# Patient Record
Sex: MALE | Race: WHITE | NOT HISPANIC OR LATINO | URBAN - METROPOLITAN AREA
[De-identification: names, ages, dates, MRNs, and addresses within clinical notes are randomized per-mention and may not be internally consistent; named-entity substitution may affect disease eponyms.]

---

## 2017-12-27 PROBLEM — Z00.00 ENCOUNTER FOR PREVENTIVE HEALTH EXAMINATION: Status: ACTIVE | Noted: 2017-12-27

## 2018-01-02 ENCOUNTER — OUTPATIENT (OUTPATIENT)
Dept: OUTPATIENT SERVICES | Facility: HOSPITAL | Age: 83
LOS: 1 days | End: 2018-01-02
Payer: MEDICARE

## 2018-01-02 ENCOUNTER — APPOINTMENT (OUTPATIENT)
Dept: ULTRASOUND IMAGING | Facility: HOSPITAL | Age: 83
End: 2018-01-02
Payer: MEDICARE

## 2018-01-02 DIAGNOSIS — N18.3 CHRONIC KIDNEY DISEASE, STAGE 3 (MODERATE): ICD-10-CM

## 2018-01-02 DIAGNOSIS — N18.9 CHRONIC KIDNEY DISEASE, UNSPECIFIED: ICD-10-CM

## 2018-01-02 PROCEDURE — 76770 US EXAM ABDO BACK WALL COMP: CPT | Mod: 26

## 2018-01-02 PROCEDURE — 76770 US EXAM ABDO BACK WALL COMP: CPT

## 2018-03-14 ENCOUNTER — APPOINTMENT (OUTPATIENT)
Dept: CT IMAGING | Facility: HOSPITAL | Age: 83
End: 2018-03-14
Payer: MEDICARE

## 2018-03-14 ENCOUNTER — OUTPATIENT (OUTPATIENT)
Dept: OUTPATIENT SERVICES | Facility: HOSPITAL | Age: 83
LOS: 1 days | End: 2018-03-14
Payer: MEDICARE

## 2018-03-14 DIAGNOSIS — K57.30 DIVERTICULOSIS OF LARGE INTESTINE WITHOUT PERFORATION OR ABSCESS WITHOUT BLEEDING: ICD-10-CM

## 2018-03-14 DIAGNOSIS — Z00.8 ENCOUNTER FOR OTHER GENERAL EXAMINATION: ICD-10-CM

## 2018-03-14 DIAGNOSIS — N28.1 CYST OF KIDNEY, ACQUIRED: ICD-10-CM

## 2018-03-14 PROCEDURE — 74176 CT ABD & PELVIS W/O CONTRAST: CPT

## 2018-03-14 PROCEDURE — 74176 CT ABD & PELVIS W/O CONTRAST: CPT | Mod: 26

## 2018-03-28 ENCOUNTER — APPOINTMENT (OUTPATIENT)
Dept: MRI IMAGING | Facility: HOSPITAL | Age: 83
End: 2018-03-28
Payer: MEDICARE

## 2018-03-28 ENCOUNTER — OUTPATIENT (OUTPATIENT)
Dept: OUTPATIENT SERVICES | Facility: HOSPITAL | Age: 83
LOS: 1 days | End: 2018-03-28
Payer: MEDICARE

## 2018-03-28 DIAGNOSIS — K76.89 OTHER SPECIFIED DISEASES OF LIVER: ICD-10-CM

## 2018-03-28 DIAGNOSIS — N28.1 CYST OF KIDNEY, ACQUIRED: ICD-10-CM

## 2018-03-28 DIAGNOSIS — Z00.8 ENCOUNTER FOR OTHER GENERAL EXAMINATION: ICD-10-CM

## 2018-03-28 PROCEDURE — 74183 MRI ABD W/O CNTR FLWD CNTR: CPT

## 2018-03-28 PROCEDURE — 74183 MRI ABD W/O CNTR FLWD CNTR: CPT | Mod: 26

## 2018-03-28 PROCEDURE — A9579: CPT

## 2018-09-18 ENCOUNTER — EMERGENCY (EMERGENCY)
Facility: HOSPITAL | Age: 83
LOS: 1 days | Discharge: HOME | End: 2018-09-18
Attending: EMERGENCY MEDICINE
Payer: MEDICARE

## 2018-09-18 VITALS
OXYGEN SATURATION: 92 % | HEART RATE: 83 BPM | TEMPERATURE: 100 F | SYSTOLIC BLOOD PRESSURE: 163 MMHG | DIASTOLIC BLOOD PRESSURE: 94 MMHG | RESPIRATION RATE: 14 BRPM | WEIGHT: 154.98 LBS

## 2018-09-18 LAB
ALBUMIN SERPL ELPH-MCNC: 3.8 G/DL — SIGNIFICANT CHANGE UP (ref 3.3–5)
ALP SERPL-CCNC: 64 U/L — SIGNIFICANT CHANGE UP (ref 40–120)
ALT FLD-CCNC: 9 U/L — LOW (ref 12–78)
ANION GAP SERPL CALC-SCNC: 9 MMOL/L — SIGNIFICANT CHANGE UP (ref 5–17)
APPEARANCE UR: CLEAR — SIGNIFICANT CHANGE UP
APTT BLD: 30.5 SEC — SIGNIFICANT CHANGE UP (ref 27.5–37.4)
AST SERPL-CCNC: 23 U/L — SIGNIFICANT CHANGE UP (ref 15–37)
BASOPHILS # BLD AUTO: 0.02 K/UL — SIGNIFICANT CHANGE UP (ref 0–0.2)
BASOPHILS NFR BLD AUTO: 0.3 % — SIGNIFICANT CHANGE UP (ref 0–2)
BILIRUB SERPL-MCNC: 0.6 MG/DL — SIGNIFICANT CHANGE UP (ref 0.2–1.2)
BILIRUB UR-MCNC: NEGATIVE — SIGNIFICANT CHANGE UP
BUN SERPL-MCNC: 24 MG/DL — HIGH (ref 7–23)
CALCIUM SERPL-MCNC: 8.4 MG/DL — LOW (ref 8.5–10.1)
CHLORIDE SERPL-SCNC: 106 MMOL/L — SIGNIFICANT CHANGE UP (ref 96–108)
CO2 SERPL-SCNC: 27 MMOL/L — SIGNIFICANT CHANGE UP (ref 22–31)
COLOR SPEC: SIGNIFICANT CHANGE UP
CREAT SERPL-MCNC: 1.4 MG/DL — HIGH (ref 0.5–1.3)
D DIMER BLD IA.RAPID-MCNC: 609 NG/ML DDU — HIGH
DIFF PNL FLD: ABNORMAL
EOSINOPHIL # BLD AUTO: 0.02 K/UL — SIGNIFICANT CHANGE UP (ref 0–0.5)
EOSINOPHIL NFR BLD AUTO: 0.3 % — SIGNIFICANT CHANGE UP (ref 0–6)
GLUCOSE SERPL-MCNC: 89 MG/DL — SIGNIFICANT CHANGE UP (ref 70–99)
GLUCOSE UR QL: NEGATIVE — SIGNIFICANT CHANGE UP
HCT VFR BLD CALC: 39.8 % — SIGNIFICANT CHANGE UP (ref 39–50)
HGB BLD-MCNC: 13.6 G/DL — SIGNIFICANT CHANGE UP (ref 13–17)
HPIV2 RNA SPEC QL NAA+PROBE: DETECTED
IMM GRANULOCYTES NFR BLD AUTO: 0.3 % — SIGNIFICANT CHANGE UP (ref 0–1.5)
INR BLD: 1.13 RATIO — SIGNIFICANT CHANGE UP (ref 0.88–1.16)
KETONES UR-MCNC: NEGATIVE — SIGNIFICANT CHANGE UP
LACTATE SERPL-SCNC: 1 MMOL/L — SIGNIFICANT CHANGE UP (ref 0.7–2)
LEUKOCYTE ESTERASE UR-ACNC: NEGATIVE — SIGNIFICANT CHANGE UP
LYMPHOCYTES # BLD AUTO: 0.75 K/UL — LOW (ref 1–3.3)
LYMPHOCYTES # BLD AUTO: 12 % — LOW (ref 13–44)
MCHC RBC-ENTMCNC: 30.7 PG — SIGNIFICANT CHANGE UP (ref 27–34)
MCHC RBC-ENTMCNC: 34.2 GM/DL — SIGNIFICANT CHANGE UP (ref 32–36)
MCV RBC AUTO: 89.8 FL — SIGNIFICANT CHANGE UP (ref 80–100)
MONOCYTES # BLD AUTO: 0.31 K/UL — SIGNIFICANT CHANGE UP (ref 0–0.9)
MONOCYTES NFR BLD AUTO: 5 % — SIGNIFICANT CHANGE UP (ref 2–14)
NEUTROPHILS # BLD AUTO: 5.14 K/UL — SIGNIFICANT CHANGE UP (ref 1.8–7.4)
NEUTROPHILS NFR BLD AUTO: 82.1 % — HIGH (ref 43–77)
NITRITE UR-MCNC: NEGATIVE — SIGNIFICANT CHANGE UP
PH UR: 5 — SIGNIFICANT CHANGE UP (ref 5–8)
PLATELET # BLD AUTO: 161 K/UL — SIGNIFICANT CHANGE UP (ref 150–400)
POTASSIUM SERPL-MCNC: 4.5 MMOL/L — SIGNIFICANT CHANGE UP (ref 3.5–5.3)
POTASSIUM SERPL-SCNC: 4.5 MMOL/L — SIGNIFICANT CHANGE UP (ref 3.5–5.3)
PROCALCITONIN SERPL-MCNC: <0.05 — SIGNIFICANT CHANGE UP (ref 0–0.04)
PROT SERPL-MCNC: 7.2 G/DL — SIGNIFICANT CHANGE UP (ref 6–8.3)
PROT UR-MCNC: 75 MG/DL
PROTHROM AB SERPL-ACNC: 12.4 SEC — SIGNIFICANT CHANGE UP (ref 9.8–12.7)
RAPID RVP RESULT: DETECTED
RBC # BLD: 4.43 M/UL — SIGNIFICANT CHANGE UP (ref 4.2–5.8)
RBC # FLD: 13.4 % — SIGNIFICANT CHANGE UP (ref 10.3–14.5)
SODIUM SERPL-SCNC: 142 MMOL/L — SIGNIFICANT CHANGE UP (ref 135–145)
SP GR SPEC: 1.01 — SIGNIFICANT CHANGE UP (ref 1.01–1.02)
TROPONIN I SERPL-MCNC: <.015 NG/ML — SIGNIFICANT CHANGE UP (ref 0.01–0.04)
UROBILINOGEN FLD QL: NEGATIVE — SIGNIFICANT CHANGE UP
WBC # BLD: 6.26 K/UL — SIGNIFICANT CHANGE UP (ref 3.8–10.5)
WBC # FLD AUTO: 6.26 K/UL — SIGNIFICANT CHANGE UP (ref 3.8–10.5)

## 2018-09-18 PROCEDURE — 99285 EMERGENCY DEPT VISIT HI MDM: CPT

## 2018-09-18 PROCEDURE — 71275 CT ANGIOGRAPHY CHEST: CPT | Mod: 26

## 2018-09-18 PROCEDURE — 71045 X-RAY EXAM CHEST 1 VIEW: CPT | Mod: 26

## 2018-09-18 RX ORDER — SODIUM CHLORIDE 9 MG/ML
1000 INJECTION INTRAMUSCULAR; INTRAVENOUS; SUBCUTANEOUS
Qty: 0 | Refills: 0 | Status: DISCONTINUED | OUTPATIENT
Start: 2018-09-18 | End: 2018-09-22

## 2018-09-18 RX ORDER — ALBUTEROL 90 UG/1
2.5 AEROSOL, METERED ORAL ONCE
Qty: 0 | Refills: 0 | Status: COMPLETED | OUTPATIENT
Start: 2018-09-18 | End: 2018-09-18

## 2018-09-18 RX ORDER — ALBUTEROL 90 UG/1
2 AEROSOL, METERED ORAL
Qty: 1 | Refills: 0
Start: 2018-09-18 | End: 2018-09-22

## 2018-09-18 RX ORDER — ACETAMINOPHEN 500 MG
650 TABLET ORAL ONCE
Qty: 0 | Refills: 0 | Status: COMPLETED | OUTPATIENT
Start: 2018-09-18 | End: 2018-09-18

## 2018-09-18 RX ORDER — DEXAMETHASONE 0.5 MG/5ML
10 ELIXIR ORAL ONCE
Qty: 0 | Refills: 0 | Status: COMPLETED | OUTPATIENT
Start: 2018-09-18 | End: 2018-09-18

## 2018-09-18 RX ORDER — IBUPROFEN 200 MG
600 TABLET ORAL ONCE
Qty: 0 | Refills: 0 | Status: COMPLETED | OUTPATIENT
Start: 2018-09-18 | End: 2018-09-18

## 2018-09-18 RX ADMIN — Medication 650 MILLIGRAM(S): at 22:00

## 2018-09-18 RX ADMIN — Medication 650 MILLIGRAM(S): at 20:10

## 2018-09-18 RX ADMIN — Medication 600 MILLIGRAM(S): at 23:42

## 2018-09-18 RX ADMIN — Medication 10 MILLIGRAM(S): at 23:39

## 2018-09-18 RX ADMIN — SODIUM CHLORIDE 100 MILLILITER(S): 9 INJECTION INTRAMUSCULAR; INTRAVENOUS; SUBCUTANEOUS at 19:41

## 2018-09-18 NOTE — ED ADULT NURSE NOTE - NSIMPLEMENTINTERV_GEN_ALL_ED
Implemented All Fall with Harm Risk Interventions:  [place] to call system. Call bell, personal items and telephone within reach. Instruct patient to call for assistance. Room bathroom lighting operational. Non-slip footwear when patient is off stretcher. Physically safe environment: no spills, clutter or unnecessary equipment. Stretcher in lowest position, wheels locked, appropriate side rails in place. Provide visual cue, wrist band, yellow gown, etc. Monitor gait and stability. Monitor for mental status changes and reorient to person, place, and time. Review medications for side effects contributing to fall risk. Reinforce activity limits and safety measures with patient and family. Provide visual clues: red socks.

## 2018-09-18 NOTE — ED PROVIDER NOTE - NS ED ROS FT
GEN: +fever, no chills, +weakness  HENT: no eye pain, no visual changes, no ear pain, no visual or hearing changes, +sore throat, no swelling or neck pain  CV: no chest pain, no palpitations, no dizziness, no swelling  RESP: +coughing, no sob, no IWOB, no [place]  GI: no abd pain, no distension, no nausea, no vomiting, no diarrhea, no constipation  : no dysuria,  no frequency, no hematuria, no discharge, no flank pain  MUSCULOSKELETAL: no myalgia, no arthralgia, no joint swelling, no bruising   SKIN: no rash, no wounds, no itching  NEURO: no change in mentation, no visual changes, no HA, no focal weakness, no trouble speaking, no gait abnormalities, no dizziness  PSYCH: no suidical ideation, no homicidal ideation, no depression, no anxiety, no hallucinations

## 2018-09-18 NOTE — ED PROVIDER NOTE - OBJECTIVE STATEMENT
81yo M h/o htn, parkinsons, recent travel to [place], [place] 2wks prior p/w fever, sore throat, cough, weakness x 2 days, seen at urgent care, found to be hypoxic, sent to ed for eval. denies n/v/d/cp/swelling/sob.

## 2018-09-18 NOTE — ED PROVIDER NOTE - PROGRESS NOTE DETAILS
ekg labs cxr unremarkable, elevated d-dimer, given recent travel, cta obtained, no PE, pna however incidental aortic anuerysm, pos RVP, pt ambulatory, findings discussed w/ pt, pt to f/u w/ pcp/cardiothoracic for further eval and mgmt

## 2018-09-18 NOTE — ED PROVIDER NOTE - MEDICAL DECISION MAKING DETAILS
83yo M h/o htn, parkinsons, recent travel to [place], [place] 2wks prior p/w fever, sore throat, cough, weakness x 2 days, seen at urgent care, found to be hypoxic, sent to ed for eval. denies n/v/d/cp/swelling/sob.

## 2018-09-18 NOTE — ED ADULT NURSE NOTE - OBJECTIVE STATEMENT
Pt recvd in room 15b- A&Ox4-pt came from Urgent care with c/o fever sore throat and weakness- found to have low O2 sat at urgent care so was sent here- pt with recent travel to [place] and [place] 2 weeks ago - has had symptoms for 2 days now -

## 2018-09-19 VITALS
TEMPERATURE: 100 F | HEART RATE: 80 BPM | RESPIRATION RATE: 20 BRPM | OXYGEN SATURATION: 94 % | SYSTOLIC BLOOD PRESSURE: 110 MMHG | DIASTOLIC BLOOD PRESSURE: 62 MMHG

## 2018-09-19 PROCEDURE — 85027 COMPLETE CBC AUTOMATED: CPT

## 2018-09-19 PROCEDURE — 85610 PROTHROMBIN TIME: CPT

## 2018-09-19 PROCEDURE — 84484 ASSAY OF TROPONIN QUANT: CPT

## 2018-09-19 PROCEDURE — 85730 THROMBOPLASTIN TIME PARTIAL: CPT

## 2018-09-19 PROCEDURE — 87798 DETECT AGENT NOS DNA AMP: CPT

## 2018-09-19 PROCEDURE — 96374 THER/PROPH/DIAG INJ IV PUSH: CPT | Mod: XU

## 2018-09-19 PROCEDURE — 71275 CT ANGIOGRAPHY CHEST: CPT

## 2018-09-19 PROCEDURE — 93005 ELECTROCARDIOGRAM TRACING: CPT

## 2018-09-19 PROCEDURE — 71045 X-RAY EXAM CHEST 1 VIEW: CPT

## 2018-09-19 PROCEDURE — 99284 EMERGENCY DEPT VISIT MOD MDM: CPT | Mod: 25

## 2018-09-19 PROCEDURE — 87581 M.PNEUMON DNA AMP PROBE: CPT

## 2018-09-19 PROCEDURE — 94640 AIRWAY INHALATION TREATMENT: CPT

## 2018-09-19 PROCEDURE — 85379 FIBRIN DEGRADATION QUANT: CPT

## 2018-09-19 PROCEDURE — 87486 CHLMYD PNEUM DNA AMP PROBE: CPT

## 2018-09-19 PROCEDURE — 87633 RESP VIRUS 12-25 TARGETS: CPT

## 2018-09-19 PROCEDURE — 80053 COMPREHEN METABOLIC PANEL: CPT

## 2018-09-19 PROCEDURE — 81001 URINALYSIS AUTO W/SCOPE: CPT

## 2018-09-19 PROCEDURE — 87040 BLOOD CULTURE FOR BACTERIA: CPT

## 2018-09-19 PROCEDURE — 84145 PROCALCITONIN (PCT): CPT

## 2018-09-19 PROCEDURE — 83605 ASSAY OF LACTIC ACID: CPT

## 2018-09-19 RX ADMIN — ALBUTEROL 2.5 MILLIGRAM(S): 90 AEROSOL, METERED ORAL at 00:03

## 2018-09-19 RX ADMIN — Medication 600 MILLIGRAM(S): at 00:15

## 2018-09-19 NOTE — ED ADULT NURSE REASSESSMENT NOTE - NS ED NURSE REASSESS COMMENT FT1
Pt diagnosed with Parainfluenza, d/nicole home-pt and wife verbalized understanding of d/c instruction- was assisted by EDT to vehicle via wheelchair in stable condition.

## 2018-09-23 LAB
CULTURE RESULTS: SIGNIFICANT CHANGE UP
CULTURE RESULTS: SIGNIFICANT CHANGE UP
SPECIMEN SOURCE: SIGNIFICANT CHANGE UP
SPECIMEN SOURCE: SIGNIFICANT CHANGE UP

## 2019-07-24 NOTE — ED ADULT NURSE NOTE - NSFALLRSKHARMRISK_ED_ALL_ED
Dr. Henriquez - See message below.       He is scheduled to see you on 8/16/19 for a wellness visit.  Can the H&P be completed also?   yes

## 2019-08-08 PROBLEM — I10 ESSENTIAL (PRIMARY) HYPERTENSION: Chronic | Status: ACTIVE | Noted: 2018-09-18

## 2019-08-08 PROBLEM — G20 PARKINSON'S DISEASE: Chronic | Status: ACTIVE | Noted: 2018-09-18

## 2019-08-08 PROBLEM — B02.9 ZOSTER WITHOUT COMPLICATIONS: Chronic | Status: ACTIVE | Noted: 2018-09-18

## 2019-08-22 ENCOUNTER — APPOINTMENT (OUTPATIENT)
Dept: MRI IMAGING | Facility: HOSPITAL | Age: 84
End: 2019-08-22
Payer: MEDICARE

## 2019-08-22 ENCOUNTER — OUTPATIENT (OUTPATIENT)
Dept: OUTPATIENT SERVICES | Facility: HOSPITAL | Age: 84
LOS: 1 days | End: 2019-08-22
Payer: MEDICARE

## 2019-08-22 DIAGNOSIS — Z00.8 ENCOUNTER FOR OTHER GENERAL EXAMINATION: ICD-10-CM

## 2019-08-22 PROCEDURE — 74183 MRI ABD W/O CNTR FLWD CNTR: CPT

## 2019-08-22 PROCEDURE — A9579: CPT

## 2019-08-22 PROCEDURE — 74183 MRI ABD W/O CNTR FLWD CNTR: CPT | Mod: 26

## 2020-02-27 ENCOUNTER — APPOINTMENT (OUTPATIENT)
Dept: ULTRASOUND IMAGING | Facility: HOSPITAL | Age: 85
End: 2020-02-27
Payer: MEDICARE

## 2020-02-27 ENCOUNTER — OUTPATIENT (OUTPATIENT)
Dept: OUTPATIENT SERVICES | Facility: HOSPITAL | Age: 85
LOS: 1 days | End: 2020-02-27
Payer: MEDICARE

## 2020-02-27 DIAGNOSIS — N18.3 CHRONIC KIDNEY DISEASE, STAGE 3 (MODERATE): ICD-10-CM

## 2020-02-27 DIAGNOSIS — N28.1 CYST OF KIDNEY, ACQUIRED: ICD-10-CM

## 2020-02-27 PROCEDURE — 76770 US EXAM ABDO BACK WALL COMP: CPT | Mod: 26

## 2020-02-27 PROCEDURE — 76770 US EXAM ABDO BACK WALL COMP: CPT

## 2020-09-11 ENCOUNTER — EMERGENCY (EMERGENCY)
Facility: HOSPITAL | Age: 85
LOS: 1 days | Discharge: HOME | End: 2020-09-11
Attending: EMERGENCY MEDICINE | Admitting: EMERGENCY MEDICINE
Payer: MEDICARE

## 2020-09-11 VITALS
RESPIRATION RATE: 18 BRPM | OXYGEN SATURATION: 92 % | TEMPERATURE: 98 F | HEART RATE: 103 BPM | DIASTOLIC BLOOD PRESSURE: 73 MMHG | SYSTOLIC BLOOD PRESSURE: 107 MMHG

## 2020-09-11 VITALS
OXYGEN SATURATION: 95 % | DIASTOLIC BLOOD PRESSURE: 80 MMHG | TEMPERATURE: 98 F | RESPIRATION RATE: 16 BRPM | HEART RATE: 85 BPM | SYSTOLIC BLOOD PRESSURE: 110 MMHG

## 2020-09-11 PROCEDURE — 70450 CT HEAD/BRAIN W/O DYE: CPT | Mod: 26

## 2020-09-11 PROCEDURE — 99284 EMERGENCY DEPT VISIT MOD MDM: CPT | Mod: 25

## 2020-09-11 PROCEDURE — 72125 CT NECK SPINE W/O DYE: CPT

## 2020-09-11 PROCEDURE — 70450 CT HEAD/BRAIN W/O DYE: CPT

## 2020-09-11 PROCEDURE — 70486 CT MAXILLOFACIAL W/O DYE: CPT

## 2020-09-11 PROCEDURE — 99284 EMERGENCY DEPT VISIT MOD MDM: CPT

## 2020-09-11 PROCEDURE — 72125 CT NECK SPINE W/O DYE: CPT | Mod: 26

## 2020-09-11 PROCEDURE — 70486 CT MAXILLOFACIAL W/O DYE: CPT | Mod: 26

## 2020-09-11 RX ORDER — BACITRACIN ZINC 500 UNIT/G
1 OINTMENT IN PACKET (EA) TOPICAL ONCE
Refills: 0 | Status: COMPLETED | OUTPATIENT
Start: 2020-09-11 | End: 2020-09-11

## 2020-09-11 RX ADMIN — Medication 1 APPLICATION(S): at 14:40

## 2020-09-11 NOTE — ED PROVIDER NOTE - NSFOLLOWUPINSTRUCTIONS_ED_ALL_ED_FT
WHAT YOU NEED TO KNOW:    As you age, your muscles weaken and your risk for falls increases. Your risk also increases if you take medicines that make you sleepy or dizzy. You may also be at risk if you have vision or joint problems, have low blood pressure, or are not active.    DISCHARGE INSTRUCTIONS:    Call 911 or have someone else call if:     You have fallen and are unconscious.      You have fallen and cannot move part of your body.    Contact your healthcare provider if:     You have fallen and have pain or a headache.      You have questions or concerns about your condition or care.    Fall prevention tips:     Stay active. Exercise can help strengthen your muscles and improve your balance. Your healthcare provider may recommend water aerobics, walking, or [place] Chi. He or she may also recommend physical therapy to improve your coordination. Never start an exercise program without asking your healthcare provider first.Water Aerobics for Seniors     Casper [employer] for Seniors           Wear shoes that fit well and have soles that . Wear shoes both inside and outside. Use slippers with good . Avoid shoes with high heels.      Use assistive devices as directed. Your healthcare provider may suggest that you use a cane or walker to help you keep your balance. You may need to have grab bars put in your bathroom near the toilet or in the shower.      Stand or sit up slowly. This may help you keep your balance and prevent falls.      Wear a personal alarm. This is a device that allows you to call 911 if you need help. Ask for more information on personal alarms.      Manage your medical conditions. Keep all appointments with your healthcare providers. Visit your eye doctor as directed.    Home safety tips:     Add items to prevent falls in the bathroom. Put nonslip strips on your bath or shower floor to prevent you from slipping. Use a bath mat if you do not have carpet in the bathroom. This will prevent you from falling when you step out of the bath or shower. Use a shower seat so you do not need to stand while you shower. Sit on the toilet or a chair in your bathroom to dry yourself and put on clothing. This will prevent you from losing your balance from drying or dressing yourself while you are standing.      Keep paths clear. Remove books, shoes, and other objects from walkways and stairs. Place cords for telephones and lamps out of the way so that you do not need to walk over them. Tape them down if you cannot move them. Remove small rugs. If you cannot remove a rug, secure it with double-sided tape. This will prevent you from tripping.      Install bright lights in your home. Use night lights to help light paths to the bathroom or kitchen. Always turn on the light before you start walking.      Keep items you use often on shelves within reach. Do not use a step stool to help you reach an item.      Paint or place reflective tape on the edges of your stairs. This will help you see the stairs better.    Follow up with your healthcare provider as directed: Write down your questions so you remember to ask them during your visits.        WHAT YOU NEED TO KNOW:    An abrasion is a scrape on your skin. It happens when your skin rubs against a rough surface. Some examples of an abrasion include rug burn, a skinned elbow, or road rash. Abrasions can be many shapes and sizes. The wound may hurt, bleed, bruise, or swell.     DISCHARGE INSTRUCTIONS:    Return to the emergency department if:     The bleeding does not stop after 10 minutes of firm pressure.      You cannot rinse one or more foreign objects out of your wound.      You have red streaks on your skin coming from your wound.    Contact your healthcare provider if:     You have a fever or chills.       Your abrasion is red, warm, swollen, or draining pus.      You have questions or concerns about your condition or care.    Care for your abrasion:     Wash your hands and dry them with a clean towel.      Press a clean cloth against your wound to stop any bleeding.      Rinse your wound with a lot of clean water. Do not use harsh soap, alcohol, or iodine solutions.      Use a clean, wet cloth to remove any objects, such as small pieces of rocks or dirt.      Rub antibiotic ointment on your wound. This may help prevent infection and help your wound heal.      Cover the wound with a non-stick bandage. Change the bandage daily, and if gets wet or dirty.     Follow up with your healthcare provider as directed: Write down your questions so you remember to ask them during your visits.        © Copyright [employer] 2020       back to top                      © Copyright [employer] 2020

## 2020-09-11 NOTE — ED PROVIDER NOTE - CARE PLAN
Principal Discharge DX:	Fall, initial encounter  Secondary Diagnosis:	Abrasion of face, initial encounter

## 2020-09-11 NOTE — ED PROVIDER NOTE - PATIENT PORTAL LINK FT
You can access the FollowMyHealth Patient Portal offered by [facility] by registering at the following website: [email or web address]. By joining [facility]’s FollowMyHealth portal, you will also be able to view your health information using other applications (apps) compatible with our system.

## 2020-09-11 NOTE — ED ADULT TRIAGE NOTE - CHIEF COMPLAINT QUOTE
patient came in ED BIBA s/p tripped and fall. as per EMS, patient was seen by bystanders who fell in front of [employer].

## 2021-11-30 ENCOUNTER — INPATIENT (INPATIENT)
Facility: HOSPITAL | Age: 86
LOS: 6 days | Discharge: HOME | DRG: 343 | End: 2021-12-07
Attending: FAMILY MEDICINE | Admitting: FAMILY MEDICINE
Payer: MEDICARE

## 2021-11-30 VITALS
OXYGEN SATURATION: 96 % | WEIGHT: 164.91 LBS | SYSTOLIC BLOOD PRESSURE: 125 MMHG | HEART RATE: 83 BPM | DIASTOLIC BLOOD PRESSURE: 84 MMHG | RESPIRATION RATE: 17 BRPM | TEMPERATURE: 98 F | HEIGHT: 60 IN

## 2021-11-30 DIAGNOSIS — K35.80 UNSPECIFIED ACUTE APPENDICITIS: ICD-10-CM

## 2021-11-30 DIAGNOSIS — I10 ESSENTIAL (PRIMARY) HYPERTENSION: ICD-10-CM

## 2021-11-30 DIAGNOSIS — G20 PARKINSON'S DISEASE: ICD-10-CM

## 2021-11-30 LAB
ALBUMIN SERPL ELPH-MCNC: 3 G/DL — LOW (ref 3.3–5)
ALP SERPL-CCNC: 55 U/L — SIGNIFICANT CHANGE UP (ref 40–120)
ALT FLD-CCNC: 10 U/L — LOW (ref 12–78)
ANION GAP SERPL CALC-SCNC: 5 MMOL/L — SIGNIFICANT CHANGE UP (ref 5–17)
APPEARANCE UR: CLEAR — SIGNIFICANT CHANGE UP
AST SERPL-CCNC: 13 U/L — LOW (ref 15–37)
BASOPHILS # BLD AUTO: 0.03 K/UL — SIGNIFICANT CHANGE UP (ref 0–0.2)
BASOPHILS NFR BLD AUTO: 0.3 % — SIGNIFICANT CHANGE UP (ref 0–2)
BILIRUB SERPL-MCNC: 0.5 MG/DL — SIGNIFICANT CHANGE UP (ref 0.2–1.2)
BILIRUB UR-MCNC: NEGATIVE — SIGNIFICANT CHANGE UP
BUN SERPL-MCNC: 33 MG/DL — HIGH (ref 7–23)
CALCIUM SERPL-MCNC: 9.1 MG/DL — SIGNIFICANT CHANGE UP (ref 8.5–10.1)
CHLORIDE SERPL-SCNC: 110 MMOL/L — HIGH (ref 96–108)
CO2 SERPL-SCNC: 26 MMOL/L — SIGNIFICANT CHANGE UP (ref 22–31)
COLOR SPEC: SIGNIFICANT CHANGE UP
CREAT SERPL-MCNC: 1.8 MG/DL — HIGH (ref 0.5–1.3)
DIFF PNL FLD: NEGATIVE — SIGNIFICANT CHANGE UP
EOSINOPHIL # BLD AUTO: 0.09 K/UL — SIGNIFICANT CHANGE UP (ref 0–0.5)
EOSINOPHIL NFR BLD AUTO: 0.9 % — SIGNIFICANT CHANGE UP (ref 0–6)
GLUCOSE SERPL-MCNC: 94 MG/DL — SIGNIFICANT CHANGE UP (ref 70–99)
GLUCOSE UR QL: NEGATIVE — SIGNIFICANT CHANGE UP
HCT VFR BLD CALC: 37.9 % — LOW (ref 39–50)
HGB BLD-MCNC: 13.3 G/DL — SIGNIFICANT CHANGE UP (ref 13–17)
IMM GRANULOCYTES NFR BLD AUTO: 0.4 % — SIGNIFICANT CHANGE UP (ref 0–1.5)
KETONES UR-MCNC: NEGATIVE — SIGNIFICANT CHANGE UP
LEUKOCYTE ESTERASE UR-ACNC: NEGATIVE — SIGNIFICANT CHANGE UP
LIDOCAIN IGE QN: 48 U/L — LOW (ref 73–393)
LYMPHOCYTES # BLD AUTO: 1.15 K/UL — SIGNIFICANT CHANGE UP (ref 1–3.3)
LYMPHOCYTES # BLD AUTO: 11.5 % — LOW (ref 13–44)
MCHC RBC-ENTMCNC: 31.4 PG — SIGNIFICANT CHANGE UP (ref 27–34)
MCHC RBC-ENTMCNC: 35.1 GM/DL — SIGNIFICANT CHANGE UP (ref 32–36)
MCV RBC AUTO: 89.4 FL — SIGNIFICANT CHANGE UP (ref 80–100)
MONOCYTES # BLD AUTO: 0.57 K/UL — SIGNIFICANT CHANGE UP (ref 0–0.9)
MONOCYTES NFR BLD AUTO: 5.7 % — SIGNIFICANT CHANGE UP (ref 2–14)
NEUTROPHILS # BLD AUTO: 8.11 K/UL — HIGH (ref 1.8–7.4)
NEUTROPHILS NFR BLD AUTO: 81.2 % — HIGH (ref 43–77)
NITRITE UR-MCNC: NEGATIVE — SIGNIFICANT CHANGE UP
NRBC # BLD: 0 /100 WBCS — SIGNIFICANT CHANGE UP (ref 0–0)
PH UR: 6 — SIGNIFICANT CHANGE UP (ref 5–8)
PLATELET # BLD AUTO: 182 K/UL — SIGNIFICANT CHANGE UP (ref 150–400)
POTASSIUM SERPL-MCNC: 4.3 MMOL/L — SIGNIFICANT CHANGE UP (ref 3.5–5.3)
POTASSIUM SERPL-SCNC: 4.3 MMOL/L — SIGNIFICANT CHANGE UP (ref 3.5–5.3)
PROT SERPL-MCNC: 6.8 G/DL — SIGNIFICANT CHANGE UP (ref 6–8.3)
PROT UR-MCNC: NEGATIVE — SIGNIFICANT CHANGE UP
RBC # BLD: 4.24 M/UL — SIGNIFICANT CHANGE UP (ref 4.2–5.8)
RBC # FLD: 12.9 % — SIGNIFICANT CHANGE UP (ref 10.3–14.5)
SARS-COV-2 RNA SPEC QL NAA+PROBE: SIGNIFICANT CHANGE UP
SODIUM SERPL-SCNC: 141 MMOL/L — SIGNIFICANT CHANGE UP (ref 135–145)
SP GR SPEC: 1.01 — SIGNIFICANT CHANGE UP (ref 1.01–1.02)
UROBILINOGEN FLD QL: NEGATIVE — SIGNIFICANT CHANGE UP
WBC # BLD: 9.99 K/UL — SIGNIFICANT CHANGE UP (ref 3.8–10.5)
WBC # FLD AUTO: 9.99 K/UL — SIGNIFICANT CHANGE UP (ref 3.8–10.5)

## 2021-11-30 PROCEDURE — 74176 CT ABD & PELVIS W/O CONTRAST: CPT | Mod: 26,MA

## 2021-11-30 PROCEDURE — 99284 EMERGENCY DEPT VISIT MOD MDM: CPT

## 2021-11-30 PROCEDURE — 99223 1ST HOSP IP/OBS HIGH 75: CPT

## 2021-11-30 RX ORDER — ATORVASTATIN CALCIUM 80 MG/1
1 TABLET, FILM COATED ORAL
Qty: 0 | Refills: 0 | DISCHARGE

## 2021-11-30 RX ORDER — SENNA PLUS 8.6 MG/1
2 TABLET ORAL AT BEDTIME
Refills: 0 | Status: DISCONTINUED | OUTPATIENT
Start: 2021-11-30 | End: 2021-12-07

## 2021-11-30 RX ORDER — SODIUM CHLORIDE 9 MG/ML
1000 INJECTION INTRAMUSCULAR; INTRAVENOUS; SUBCUTANEOUS ONCE
Refills: 0 | Status: COMPLETED | OUTPATIENT
Start: 2021-11-30 | End: 2021-11-30

## 2021-11-30 RX ORDER — PRAMIPEXOLE DIHYDROCHLORIDE 0.12 MG/1
0.12 TABLET ORAL AT BEDTIME
Refills: 0 | Status: DISCONTINUED | OUTPATIENT
Start: 2021-11-30 | End: 2021-11-30

## 2021-11-30 RX ORDER — ATORVASTATIN CALCIUM 80 MG/1
0 TABLET, FILM COATED ORAL
Qty: 0 | Refills: 0 | DISCHARGE

## 2021-11-30 RX ORDER — LINACLOTIDE 145 UG/1
1 CAPSULE, GELATIN COATED ORAL
Qty: 0 | Refills: 0 | DISCHARGE

## 2021-11-30 RX ORDER — POLYETHYLENE GLYCOL 3350 17 G/17G
17 POWDER, FOR SOLUTION ORAL DAILY
Refills: 0 | Status: DISCONTINUED | OUTPATIENT
Start: 2021-11-30 | End: 2021-12-06

## 2021-11-30 RX ORDER — RIVASTIGMINE 4.6 MG/24H
1 PATCH, EXTENDED RELEASE TRANSDERMAL
Qty: 0 | Refills: 0 | DISCHARGE

## 2021-11-30 RX ORDER — AMLODIPINE BESYLATE 2.5 MG/1
2.5 TABLET ORAL DAILY
Refills: 0 | Status: DISCONTINUED | OUTPATIENT
Start: 2021-11-30 | End: 2021-12-02

## 2021-11-30 RX ORDER — CALCITRIOL 0.5 UG/1
1 CAPSULE ORAL
Qty: 0 | Refills: 0 | DISCHARGE

## 2021-11-30 RX ORDER — PANTOPRAZOLE SODIUM 20 MG/1
40 TABLET, DELAYED RELEASE ORAL DAILY
Refills: 0 | Status: DISCONTINUED | OUTPATIENT
Start: 2021-11-30 | End: 2021-12-04

## 2021-11-30 RX ORDER — ATORVASTATIN CALCIUM 80 MG/1
20 TABLET, FILM COATED ORAL AT BEDTIME
Refills: 0 | Status: DISCONTINUED | OUTPATIENT
Start: 2021-11-30 | End: 2021-11-30

## 2021-11-30 RX ORDER — SODIUM CHLORIDE 9 MG/ML
1000 INJECTION, SOLUTION INTRAVENOUS
Refills: 0 | Status: DISCONTINUED | OUTPATIENT
Start: 2021-11-30 | End: 2021-12-01

## 2021-11-30 RX ORDER — GLYCERIN ADULT
1 SUPPOSITORY, RECTAL RECTAL DAILY
Refills: 0 | Status: DISCONTINUED | OUTPATIENT
Start: 2021-11-30 | End: 2021-12-07

## 2021-11-30 RX ORDER — OXYBUTYNIN CHLORIDE 5 MG
5 TABLET ORAL
Refills: 0 | Status: DISCONTINUED | OUTPATIENT
Start: 2021-11-30 | End: 2021-12-05

## 2021-11-30 RX ORDER — IOHEXOL 300 MG/ML
30 INJECTION, SOLUTION INTRAVENOUS ONCE
Refills: 0 | Status: COMPLETED | OUTPATIENT
Start: 2021-11-30 | End: 2021-11-30

## 2021-11-30 RX ORDER — MORPHINE SULFATE 50 MG/1
4 CAPSULE, EXTENDED RELEASE ORAL ONCE
Refills: 0 | Status: DISCONTINUED | OUTPATIENT
Start: 2021-11-30 | End: 2021-11-30

## 2021-11-30 RX ORDER — AMLODIPINE BESYLATE 2.5 MG/1
1 TABLET ORAL
Qty: 0 | Refills: 0 | DISCHARGE

## 2021-11-30 RX ORDER — ONDANSETRON 8 MG/1
4 TABLET, FILM COATED ORAL
Refills: 0 | Status: DISCONTINUED | OUTPATIENT
Start: 2021-11-30 | End: 2021-12-07

## 2021-11-30 RX ORDER — CALCITRIOL 0.5 UG/1
0.25 CAPSULE ORAL DAILY
Refills: 0 | Status: DISCONTINUED | OUTPATIENT
Start: 2021-11-30 | End: 2021-12-07

## 2021-11-30 RX ORDER — PRAMIPEXOLE DIHYDROCHLORIDE 0.12 MG/1
1 TABLET ORAL
Qty: 0 | Refills: 0 | DISCHARGE

## 2021-11-30 RX ORDER — CARBIDOPA AND LEVODOPA 25; 100 MG/1; MG/1
1.5 TABLET ORAL
Qty: 0 | Refills: 0 | DISCHARGE

## 2021-11-30 RX ORDER — GABAPENTIN 400 MG/1
100 CAPSULE ORAL THREE TIMES A DAY
Refills: 0 | Status: DISCONTINUED | OUTPATIENT
Start: 2021-11-30 | End: 2021-12-07

## 2021-11-30 RX ORDER — GABAPENTIN 400 MG/1
0 CAPSULE ORAL
Qty: 0 | Refills: 0 | DISCHARGE

## 2021-11-30 RX ORDER — MORPHINE SULFATE 50 MG/1
2 CAPSULE, EXTENDED RELEASE ORAL EVERY 6 HOURS
Refills: 0 | Status: DISCONTINUED | OUTPATIENT
Start: 2021-11-30 | End: 2021-11-30

## 2021-11-30 RX ORDER — ONDANSETRON 8 MG/1
4 TABLET, FILM COATED ORAL ONCE
Refills: 0 | Status: COMPLETED | OUTPATIENT
Start: 2021-11-30 | End: 2021-11-30

## 2021-11-30 RX ORDER — CARBIDOPA AND LEVODOPA 25; 100 MG/1; MG/1
1 TABLET ORAL ONCE
Refills: 0 | Status: COMPLETED | OUTPATIENT
Start: 2021-11-30 | End: 2021-11-30

## 2021-11-30 RX ORDER — ATORVASTATIN CALCIUM 80 MG/1
10 TABLET, FILM COATED ORAL AT BEDTIME
Refills: 0 | Status: DISCONTINUED | OUTPATIENT
Start: 2021-11-30 | End: 2021-12-07

## 2021-11-30 RX ORDER — PRAMIPEXOLE DIHYDROCHLORIDE 0.12 MG/1
1 TABLET ORAL DAILY
Refills: 0 | Status: DISCONTINUED | OUTPATIENT
Start: 2021-11-30 | End: 2021-12-07

## 2021-11-30 RX ORDER — PRAMIPEXOLE DIHYDROCHLORIDE 0.12 MG/1
0 TABLET ORAL
Qty: 0 | Refills: 0 | DISCHARGE

## 2021-11-30 RX ORDER — AMLODIPINE BESYLATE 2.5 MG/1
0 TABLET ORAL
Qty: 0 | Refills: 0 | DISCHARGE

## 2021-11-30 RX ORDER — CARBIDOPA AND LEVODOPA 25; 100 MG/1; MG/1
0 TABLET ORAL
Qty: 0 | Refills: 0 | DISCHARGE

## 2021-11-30 RX ORDER — CARBIDOPA AND LEVODOPA 25; 100 MG/1; MG/1
1 TABLET ORAL DAILY
Refills: 0 | Status: DISCONTINUED | OUTPATIENT
Start: 2021-11-30 | End: 2021-11-30

## 2021-11-30 RX ORDER — PIPERACILLIN AND TAZOBACTAM 4; .5 G/20ML; G/20ML
3.38 INJECTION, POWDER, LYOPHILIZED, FOR SOLUTION INTRAVENOUS EVERY 8 HOURS
Refills: 0 | Status: DISCONTINUED | OUTPATIENT
Start: 2021-12-01 | End: 2021-12-07

## 2021-11-30 RX ORDER — CARBIDOPA AND LEVODOPA 25; 100 MG/1; MG/1
1.5 TABLET ORAL
Refills: 0 | Status: DISCONTINUED | OUTPATIENT
Start: 2021-11-30 | End: 2021-12-07

## 2021-11-30 RX ORDER — SOLIFENACIN SUCCINATE 10 MG/1
1 TABLET ORAL
Qty: 0 | Refills: 0 | DISCHARGE

## 2021-11-30 RX ORDER — PIPERACILLIN AND TAZOBACTAM 4; .5 G/20ML; G/20ML
3.38 INJECTION, POWDER, LYOPHILIZED, FOR SOLUTION INTRAVENOUS ONCE
Refills: 0 | Status: COMPLETED | OUTPATIENT
Start: 2021-11-30 | End: 2021-11-30

## 2021-11-30 RX ORDER — CARBIDOPA AND LEVODOPA 25; 100 MG/1; MG/1
1.5 TABLET ORAL ONCE
Refills: 0 | Status: COMPLETED | OUTPATIENT
Start: 2021-11-30 | End: 2021-11-30

## 2021-11-30 RX ORDER — SOLIFENACIN SUCCINATE 10 MG/1
0 TABLET ORAL
Qty: 0 | Refills: 0 | DISCHARGE

## 2021-11-30 RX ADMIN — PIPERACILLIN AND TAZOBACTAM 200 GRAM(S): 4; .5 INJECTION, POWDER, LYOPHILIZED, FOR SOLUTION INTRAVENOUS at 23:04

## 2021-11-30 RX ADMIN — CARBIDOPA AND LEVODOPA 1.5 TABLET(S): 25; 100 TABLET ORAL at 19:11

## 2021-11-30 RX ADMIN — ATORVASTATIN CALCIUM 10 MILLIGRAM(S): 80 TABLET, FILM COATED ORAL at 23:56

## 2021-11-30 RX ADMIN — GABAPENTIN 100 MILLIGRAM(S): 400 CAPSULE ORAL at 22:57

## 2021-11-30 RX ADMIN — SODIUM CHLORIDE 75 MILLILITER(S): 9 INJECTION, SOLUTION INTRAVENOUS at 20:20

## 2021-11-30 RX ADMIN — SODIUM CHLORIDE 1000 MILLILITER(S): 9 INJECTION INTRAMUSCULAR; INTRAVENOUS; SUBCUTANEOUS at 14:05

## 2021-11-30 RX ADMIN — IOHEXOL 30 MILLILITER(S): 300 INJECTION, SOLUTION INTRAVENOUS at 14:05

## 2021-11-30 RX ADMIN — SODIUM CHLORIDE 1000 MILLILITER(S): 9 INJECTION INTRAMUSCULAR; INTRAVENOUS; SUBCUTANEOUS at 19:19

## 2021-11-30 RX ADMIN — SENNA PLUS 2 TABLET(S): 8.6 TABLET ORAL at 22:57

## 2021-11-30 NOTE — ED ADULT NURSE NOTE - OBJECTIVE STATEMENT
Pt. received alert and oriented x3 with chief complaint of RLQ abdominal pain for 2 days. Pt. denies N/V/D.

## 2021-11-30 NOTE — PATIENT PROFILE ADULT - FALL HARM RISK - HARM RISK INTERVENTIONS
Assistance with ambulation/Assistance OOB with selected safe patient handling equipment/Communicate Risk of Fall with Harm to all staff/Discuss with provider need for PT consult/Monitor gait and stability/Reinforce activity limits and safety measures with patient and family/Sit up slowly, dangle for a short time, stand at bedside before walking/Tailored Fall Risk Interventions/Visual Cue: Yellow wristband and red socks/Bed in lowest position, wheels locked, appropriate side rails in place/Call bell, personal items and telephone in reach/Instruct patient to call for assistance before getting out of bed or chair/Non-slip footwear when patient is out of bed/[place] to call system/Physically safe environment - no spills, clutter or unnecessary equipment/Purposeful Proactive Rounding/Room/bathroom lighting operational, light cord in reach

## 2021-11-30 NOTE — ED PROVIDER NOTE - OBJECTIVE STATEMENT
84 yo M PMHx Parkinson's disease, HTN, HLD presents to ED c/o right sided abd pain x 3-4 days. Pt states pain is constant aching 7/10 pain. pt also c/o chronic constipation, takes Miralax nightly, admits to smaller BMs in the last few days. last BM 2 days ago. Pt denies N/V/D, fever/chills, urinary symptoms, chest pain, SOB.  PCP- Ruy Garcia

## 2021-11-30 NOTE — ED PROVIDER NOTE - CLINICAL SUMMARY MEDICAL DECISION MAKING FREE TEXT BOX
84 yo M p/w right sided abd pain ro SBO  vs constipation vs appy 86 yo M p/w right sided abd pain ro SBO vs appy vs constipation

## 2021-11-30 NOTE — H&P ADULT - NSHPLABSRESULTS_GEN_ALL_CORE
13.3   9.99  )-----------( 182      ( 30 Nov 2021 12:59 )             37.9     30 Nov 2021 12:59    141    |  110    |  33     ----------------------------<  94     4.3     |  26     |  1.80     Ca    9.1        30 Nov 2021 12:59    TPro  6.8    /  Alb  3.0    /  TBili  0.5    /  DBili  x      /  AST  13     /  ALT  10     /  AlkPhos  55     30 Nov 2021 12:59    LIVER FUNCTIONS - ( 30 Nov 2021 12:59 )  Alb: 3.0 g/dL / Pro: 6.8 g/dL / ALK PHOS: 55 U/L / ALT: 10 U/L / AST: 13 U/L / GGT: x             CAPILLARY BLOOD GLUCOSE  < from: CT Abdomen and Pelvis w/ Oral Cont (11.30.21 @ 15:59) >    IMPRESSION:  Acute nonperforated appendicitis with soft tissue mass at the base of the appendix measuring approximately 3.4 cm in diameter. Findings are concerning for malignancy.    < end of copied text >

## 2021-11-30 NOTE — CONSULT NOTE ADULT - ASSESSMENT
colonic mass vs appendicitis    plan  colonosopcy to be done  surgery to see pt  cea level  oncology to see pt  to follow up    Advanced care planning was discussed with patient and family.  Advanced care planning forms were reviewed and discussed.  Risks, benefits and alternatives of gastroenterologic procedures were discussed in detail and all questions were answered.    30 minutes spent.

## 2021-11-30 NOTE — PATIENT PROFILE ADULT - VISION (WITH CORRECTIVE LENSES IF THE PATIENT USUALLY WEARS THEM):
Glasses-reading/Partially impaired: cannot see medication labels or newsprint, but can see obstacles in path, and the surrounding layout; can count fingers at arm's length

## 2021-11-30 NOTE — ED ADULT NURSE NOTE - NSIMPLEMENTINTERV_GEN_ALL_ED
Implemented All Universal Safety Interventions:  [place] to call system. Call bell, personal items and telephone within reach. Instruct patient to call for assistance. Room bathroom lighting operational. Non-slip footwear when patient is off stretcher. Physically safe environment: no spills, clutter or unnecessary equipment. Stretcher in lowest position, wheels locked, appropriate side rails in place.

## 2021-11-30 NOTE — ED PROVIDER NOTE - ATTENDING CONTRIBUTION TO CARE
Pt is a 84 yo male who presents to the ED with a cc of abdominal pain. PMHx of HTN, Parkinsons disease, shingles, h/o chronic constipation. Pt had been c/o abdominal pain for the last 4-5 days. Pain appears to be waxing and waning. He has been taking Tylenol for the pain which sometimes helps. Today the pain appeared somewhat worse and pt asked to go to the ED. Initially they presented to urgent care and was then sent to the ED for further work up. Pt reports that currently he has mild pain but when he begins to move the pain worsens. Pt reports that the pain is localized to his right abdominal region. Pain is not worsened with eating. Pt has only had some small bowel movements over the last several days but he takes Linzes and Miralax daily. Denies fever, but reports chills. Denies N/V, CP, SOB.     PMD: Tamparo  GI: none   COVID vaccinated [employer].

## 2021-11-30 NOTE — CONSULT NOTE ADULT - ATTENDING COMMENTS
PT seen and examined with PA in the ED this evening.  wife present at bedside.  Mild RLQ tenderness x 5-6 days.  No change in bowel function.  NO fever.  Labs and imaging personally reviewed.    CBC with normal WBC, mild N Shift.  Imaging personally reviewed with findings of dilated appendix with minimal periappendiceal stranding at bas near cecum with induration of cecum vs 3 cm mass.      Given the patient's age, simple appendicitis would be unusual and in the face of duration of symptoms and unimpressive blood work, concern for cecal lesion/malignancy needs further evaluation.    Recommend sending CEA levels.  GI consultation for Colonoscopy (last scope over 10 years ago)  IV antibiotics.  Keep NPO except meds.  DVT and GI prophylaxis.    Discussed with patient and his wife plans of further workup and potential for surgery (Laparoscopic Appendectomy, vs appendectomy with partial cecectomy vs right hemicolectomy).    will follow closely.

## 2021-11-30 NOTE — H&P ADULT - NSICDXPASTMEDICALHX_GEN_ALL_CORE_FT
PAST MEDICAL HISTORY:  HTN (hypertension)     Hypercholesteremia     Mild asthma     Parkinson disease     Shingles

## 2021-11-30 NOTE — CONSULT NOTE ADULT - SUBJECTIVE AND OBJECTIVE BOX
Chief Complaint:  Patient is a 85y old  Male who presents with a chief complaint of abd pain mass found found on imaging  · Chief Complaint: The patient is a 85y Male complaining of abdominal pain.  · HPI Objective Statement: 84 yo M PMHx Parkinson's disease, HTN, HLD presents to ED c/o right sided abd pain x 3-4 days. Pt states pain is constant aching 7/10 pain. pt also c/o chronic constipation, takes Miralax nightly, admits to smaller BMs in the last few days. last BM 2 days ago. Pt denies N/V/D, fever/chills, urinary symptoms, chest pain, SOB.  PCP- Ruy Garcia  · Presenting Symptoms: PAIN  · Location: abdominal region  · Laterality: right  · Timing: gradual onset  · Duration: day(s)  · Severity: MODERATE  · Context: unknown  · Recent Exposure To: none known  · Aggravated Factors: none  · Relieving Factors: none      Allergies:  No Known Allergies      Medications:      PMHX/PSHX:  Parkinson disease    Shingles    HTN (hypertension)        Family history:    no uc no cd    Social History:   no etoh no cigs no ivda    ROS:     General:  No wt loss, fevers, chills, night sweats, fatigue,   Eyes:  Good vision, no reported pain  ENT:  No sore throat, pain, runny nose, dysphagia  CV:  No pain, palpitations, hypo/hypertension  Resp:  No dyspnea, cough, tachypnea, wheezing  GI:  No pain, No nausea, No vomiting, No diarrhea, No constipation, No weight loss, No fever, No pruritis, No rectal bleeding, No tarry stools, No dysphagia,  :  No pain, bleeding, incontinence, nocturia  Muscle:  No pain, weakness  Neuro:  No weakness, tingling, memory problems  Psych:  No fatigue, insomnia, mood problems, depression  Endocrine:  No polyuria, polydipsia, cold/heat intolerance  Heme:  No petechiae, ecchymosis, easy bruisability  Skin:  No rash, tattoos, scars, edema      PHYSICAL EXAM:   Vital Signs:  Vital Signs Last 24 Hrs  T(C): 36.7 (30 Nov 2021 12:09), Max: 36.7 (30 Nov 2021 12:09)  T(F): 98.1 (30 Nov 2021 12:09), Max: 98.1 (30 Nov 2021 12:09)  HR: 83 (30 Nov 2021 12:09) (83 - 83)  BP: 125/84 (30 Nov 2021 12:09) (125/84 - 125/84)  BP(mean): --  RR: 17 (30 Nov 2021 12:09) (17 - 17)  SpO2: 96% (30 Nov 2021 12:09) (96% - 96%)  Daily Height in cm: 149.86 (30 Nov 2021 12:09)    Daily     GENERAL:  Appears stated age, well-groomed, well-nourished, no distress  HEENT:  NC/AT,  conjunctivae clear and pink, no thyromegaly, nodules, adenopathy, no JVD, sclera -anicteric  CHEST:  Full & symmetric excursion, no increased effort, breath sounds clear  HEART:  Regular rhythm, S1, S2, no murmur/rub/S3/S4, no abdominal bruit, no edema  ABDOMEN:  Soft, non-tender, non-distended, normoactive bowel sounds,  no masses ,no hepato-splenomegaly, no signs of chronic liver disease  EXTEREMITIES:  no cyanosis,clubbing or edema  SKIN:  No rash/erythema/ecchymoses/petechiae/wounds/abscess/warm/dry  NEURO:  Alert, oriented, no asterixis, no tremor, no encephalopathy    LABS:                        13.3   9.99  )-----------( 182      ( 30 Nov 2021 12:59 )             37.9     11-30    141  |  110<H>  |  33<H>  ----------------------------<  94  4.3   |  26  |  1.80<H>    Ca    9.1      30 Nov 2021 12:59    TPro  6.8  /  Alb  3.0<L>  /  TBili  0.5  /  DBili  x   /  AST  13<L>  /  ALT  10<L>  /  AlkPhos  55  11-30    LIVER FUNCTIONS - ( 30 Nov 2021 12:59 )  Alb: 3.0 g/dL / Pro: 6.8 g/dL / ALK PHOS: 55 U/L / ALT: 10 U/L / AST: 13 U/L / GGT: x               Amylase Serum--      Lipase serum48       Ammonia--      Imaging:

## 2021-11-30 NOTE — CONSULT NOTE ADULT - SUBJECTIVE AND OBJECTIVE BOX
86 yo M PMHx Parkinson's disease, HTN, HLD presents to ED c/o right sided abd pain x 3-4 days. Pt states pain is constant aching 7/10 pain. pt also c/o chronic constipation, takes Miralax nightly, admits to smaller BMs in the last few days. last BM 2 days ago. Pt denies N/V/D, fever/chills, urinary symptoms, chest pain, SOB.    PAST MEDICAL & SURGICAL HISTORY:    Parkinson disease    Shingles    HTN (hypertension)    FAMILY HISTORY:    Tobacco-Denies  Etoh-Denies  Drugs-Denies    No Known Allergies    Home Medications:  amLODIPine:  (18 Sep 2018 18:17)  atorvastatin:  (30 Nov 2021 17:13)  carbidopa-levodopa 25 mg-100 mg oral tablet, extended release:  (18 Sep 2018 18:17)  gabapentin:  (18 Sep 2018 18:17)  Linzess 72 mcg oral capsule: 1 cap(s) orally once a day (30 Nov 2021 17:13)  pramipexole:  (18 Sep 2018 18:17)  VESIcare:  (18 Sep 2018 18:17)    MEDICATIONS  (STANDING):  amLODIPine   Tablet 2.5 milliGRAM(s) Oral daily  atorvastatin 20 milliGRAM(s) Oral at bedtime  carbidopa/levodopa CR 25/100 1 Tablet(s) Oral daily  gabapentin 100 milliGRAM(s) Oral three times a day  oxybutynin 5 milliGRAM(s) Oral two times a day  pramipexole 0.125 milliGRAM(s) Oral at bedtime    MEDICATIONS  (PRN):      REVIEW OF SYSTEMS    General    REVIEW OF SYSTEM:  CONSTITUTIONAL: No weakness, fevers or chills  EYES/ENT: No visual changes;  No vertigo or throat pain.  NECK: No pain or stiffness  RESPIRATORY: No cough, wheezing, hemoptysis; No shortness of breath  CARDIOVASCULAR: No chest pain or palpitations  GASTROINTESTINAL: SEe HPI  GENITOURINARY: No dysuria, frequency or hematuria  NEUROLOGICAL: No numbness or weakness  SKIN: No itching, burning, rashes, or lesions   All other review of systems is negative unless indicated above.    Allergic/Immunologic:	    Vital Signs Last 24 Hrs  T(C): 36.7 (30 Nov 2021 12:09), Max: 36.7 (30 Nov 2021 12:09)  T(F): 98.1 (30 Nov 2021 12:09), Max: 98.1 (30 Nov 2021 12:09)  HR: 83 (30 Nov 2021 12:09) (83 - 83)  BP: 125/84 (30 Nov 2021 12:09) (125/84 - 125/84)  BP(mean): --  RR: 17 (30 Nov 2021 12:09) (17 - 17)  SpO2: 96% (30 Nov 2021 12:09) (96% - 96%)    I&O's Detail  PHYSICAL EXAM:  GENERAL: NAD, well-groomed, well-developed, intention tremor  HEAD:  Atraumatic, Normocephalic  HEART: Regular rate and rhythm; No murmurs, rubs, or gallops  RESPIRATORY: CTA B/L, No W/R/R  ABDOMEN: Soft, distended, (+) RLQ region ttp, no rebound, no guarding, no peritoneal signs   NEUROLOGY: A&Ox3, nonfocal  EXTREMITIES:  Calves soft b/l no ttp  SKIN: warm, dry, normal color, no rash or abnormal lesions                          13.3   9.99  )-----------( 182      ( 30 Nov 2021 12:59 )             37.9       11-30    141  |  110<H>  |  33<H>  ----------------------------<  94  4.3   |  26  |  1.80<H>    Ca    9.1      30 Nov 2021 12:59    TPro  6.8  /  Alb  3.0<L>  /  TBili  0.5  /  DBili  x   /  AST  13<L>  /  ALT  10<L>  /  AlkPhos  55  11-30    LIVER FUNCTIONS - ( 30 Nov 2021 12:59 )  Alb: 3.0 g/dL / Pro: 6.8 g/dL / ALK PHOS: 55 U/L / ALT: 10 U/L / AST: 13 U/L / GGT: x           Radiology Findings:     < from: CT Abdomen and Pelvis w/ Oral Cont (11.30.21 @ 15:59) >    EXAM:  CT ABDOMEN AND PELVIS OC                            PROCEDURE DATE:  11/30/2021        INTERPRETATION:  CLINICAL INFORMATION: Right-sided abdominal pain. Shingles.    COMPARISON: Reference is made to MRI 8/22/2019 and CT scan 3/14/2018.    CONTRAST/COMPLICATIONS:  IV Contrast: NONE  0 cc administered   0 cc discarded  Oral Contrast: Omnipaque 300  Complications: None reported at time of study completion    PROCEDURE:  CT of the Abdomen and Pelvis was performed.  Sagittal and coronal reformats were performed.    FINDINGS:  LOWER CHEST: Cardiomegaly. Dependent atelectasis.    LIVER: Punctate calcification in the right hepatic lobe.  BILE DUCTS: Normal caliber.  GALLBLADDER: Within normal limits.  SPLEEN: Within normal limits.  PANCREAS: Within normal limits.  ADRENALS: Within normal limits.  KIDNEYS/URETERS: Bilateral renal lesions corresponding with hemorrhagic and nonhemorrhagic cysts present on prior MRI. No calculus or hydronephrosis.    BLADDER: Within normal limits.  REPRODUCTIVE ORGANS: Status post TURP.    BOWEL: No bowel obstruction. Soft tissue mass at the base of the appendix measuring 3.4 cm in diameter with dilatation of the appendix and periappendiceal fat stranding.(2:78 and 602:35). Diverticulosis, without acute diverticulitis.  PERITONEUM: No ascites.  VESSELS: Atherosclerotic calcifications.  RETROPERITONEUM/LYMPH NODES: No lymphadenopathy.  ABDOMINAL WALL: Within normal limits.  BONES: Degenerative changes.    IMPRESSION:  Acute nonperforated appendicitis with soft tissue mass at the base of the appendix measuring approximately 3.4 cm in diameter. Findings are concerning for malignancy.        --- End of Report ---      ELIU ALANIS MD; Attending Radiologist  This document has been electronically signed. Nov 30 2021  4:25PM    < end of copied text >      A/P 85yMaleHPI:                   86 yo M PMHx Parkinson's disease (pt ambulatory and independent with ADL's however with limited mobility, primarily ambulates within home with no assistive device), HTN, HLD presents to ED c/o right sided abd pain x 3-4 days. Denies any nausea/vomiting. Pt states pain is constant aching 7/10 paintto generalized abdominal region worse to RLQ region. Pt with hx of c/o chronic constipation, takes Miralax nightly, admits to smaller BMs in the last few days. last BM yesterday am per patient normal, denies diarrhea. Denies sick contacts. Pt denies N/V/D, fever/chills, urinary symptoms, chest pain, SOB. Wife at bedside. Last colonoscopy was 10 years ago, does not have an established gastroenterologist. Denies any recent weight loss or hematochezia or melana.     PAST MEDICAL & SURGICAL HISTORY:    Parkinson disease    Shingles    HTN (hypertension)    FAMILY HISTORY:    Tobacco-Denies  Etoh-Denies  Drugs-Denies    No Known Allergies    Home Medications:  amLODIPine:  (18 Sep 2018 18:17)  atorvastatin:  (30 Nov 2021 17:13)  carbidopa-levodopa 25 mg-100 mg oral tablet, extended release:  (18 Sep 2018 18:17)  gabapentin:  (18 Sep 2018 18:17)  Linzess 72 mcg oral capsule: 1 cap(s) orally once a day (30 Nov 2021 17:13)  pramipexole:  (18 Sep 2018 18:17)  VESIcare:  (18 Sep 2018 18:17)    MEDICATIONS  (STANDING):  amLODIPine   Tablet 2.5 milliGRAM(s) Oral daily  atorvastatin 20 milliGRAM(s) Oral at bedtime  carbidopa/levodopa CR 25/100 1 Tablet(s) Oral daily  gabapentin 100 milliGRAM(s) Oral three times a day  oxybutynin 5 milliGRAM(s) Oral two times a day  pramipexole 0.125 milliGRAM(s) Oral at bedtime    MEDICATIONS  (PRN):      REVIEW OF SYSTEMS    General    REVIEW OF SYSTEM:  CONSTITUTIONAL: No weakness, fevers or chills  EYES/ENT: No visual changes;  No vertigo or throat pain.  NECK: No pain or stiffness  RESPIRATORY: No cough, wheezing, hemoptysis; No shortness of breath  CARDIOVASCULAR: No chest pain or palpitations  GASTROINTESTINAL: See HPI  GENITOURINARY: No dysuria, frequency or hematuria  NEUROLOGICAL: No numbness or weakness  SKIN: No itching, burning, rashes, or lesions   All other review of systems is negative unless indicated above.    Allergic/Immunologic:	    Vital Signs Last 24 Hrs  T(C): 36.7 (30 Nov 2021 12:09), Max: 36.7 (30 Nov 2021 12:09)  T(F): 98.1 (30 Nov 2021 12:09), Max: 98.1 (30 Nov 2021 12:09)  HR: 83 (30 Nov 2021 12:09) (83 - 83)  BP: 125/84 (30 Nov 2021 12:09) (125/84 - 125/84)  BP(mean): --  RR: 17 (30 Nov 2021 12:09) (17 - 17)  SpO2: 96% (30 Nov 2021 12:09) (96% - 96%)    I&O's Detail  PHYSICAL EXAM:  GENERAL: NAD, well-groomed, well-developed, intention tremor  HEAD:  Atraumatic, Normocephalic  HEART: Regular rate and rhythm; No murmurs, rubs, or gallops  RESPIRATORY: CTA B/L, No W/R/R  ABDOMEN: Soft, distended, (+) RLQ region ttp, no rebound, no guarding, no peritoneal signs, no scars.  NEUROLOGY: A&Ox3, nonfocal  EXTREMITIES:  Calves soft b/l no ttp  SKIN: warm, dry, normal color, no rash or abnormal lesions                          13.3   9.99  )-----------( 182      ( 30 Nov 2021 12:59 )             37.9       11-30    141  |  110<H>  |  33<H>  ----------------------------<  94  4.3   |  26  |  1.80<H>    Ca    9.1      30 Nov 2021 12:59    TPro  6.8  /  Alb  3.0<L>  /  TBili  0.5  /  DBili  x   /  AST  13<L>  /  ALT  10<L>  /  AlkPhos  55  11-30    LIVER FUNCTIONS - ( 30 Nov 2021 12:59 )  Alb: 3.0 g/dL / Pro: 6.8 g/dL / ALK PHOS: 55 U/L / ALT: 10 U/L / AST: 13 U/L / GGT: x           Radiology Findings:     < from: CT Abdomen and Pelvis w/ Oral Cont (11.30.21 @ 15:59) >    EXAM:  CT ABDOMEN AND PELVIS OC                            PROCEDURE DATE:  11/30/2021        INTERPRETATION:  CLINICAL INFORMATION: Right-sided abdominal pain. Shingles.    COMPARISON: Reference is made to MRI 8/22/2019 and CT scan 3/14/2018.    CONTRAST/COMPLICATIONS:  IV Contrast: NONE  0 cc administered   0 cc discarded  Oral Contrast: Omnipaque 300  Complications: None reported at time of study completion    PROCEDURE:  CT of the Abdomen and Pelvis was performed.  Sagittal and coronal reformats were performed.    FINDINGS:  LOWER CHEST: Cardiomegaly. Dependent atelectasis.    LIVER: Punctate calcification in the right hepatic lobe.  BILE DUCTS: Normal caliber.  GALLBLADDER: Within normal limits.  SPLEEN: Within normal limits.  PANCREAS: Within normal limits.  ADRENALS: Within normal limits.  KIDNEYS/URETERS: Bilateral renal lesions corresponding with hemorrhagic and nonhemorrhagic cysts present on prior MRI. No calculus or hydronephrosis.    BLADDER: Within normal limits.  REPRODUCTIVE ORGANS: Status post TURP.    BOWEL: No bowel obstruction. Soft tissue mass at the base of the appendix measuring 3.4 cm in diameter with dilatation of the appendix and periappendiceal fat stranding.(2:78 and 602:35). Diverticulosis, without acute diverticulitis.  PERITONEUM: No ascites.  VESSELS: Atherosclerotic calcifications.  RETROPERITONEUM/LYMPH NODES: No lymphadenopathy.  ABDOMINAL WALL: Within normal limits.  BONES: Degenerative changes.    IMPRESSION:  Acute nonperforated appendicitis with soft tissue mass at the base of the appendix measuring approximately 3.4 cm in diameter. Findings are concerning for malignancy.      --- End of Report ---      ELIU ALANIS MD; Attending Radiologist  This document has been electronically signed. Nov 30 2021  4:25PM    < end of copied text >    A/P 85 Y.O. M with hx of Parkinson's Disease (14 years), HTN, HLD presents to [facility] ED with 4-5 day hx of abdominal pain found to have Acute non-perforated appendicitis with soft tissue mass at the base of appendix measure approximately 3.4 cm, findings concerning for malignancy.    -Pt will require GI eval for colonoscopy for biopsy and CEA for further work up prior to surgery  -Given normal wc, afebrile and non-perforated appendix, reassuring, start IV Zosyn  -Keep NPO with IVF  -Discussed with Dr. West  -Will follow

## 2021-11-30 NOTE — H&P ADULT - HISTORY OF PRESENT ILLNESS
· Chief Complaint: The patient is a 85y Male complaining of abdominal pain.  · HPI Objective Statement: 84 yo M PMHx Parkinson's disease, HTN, HLD presents to ED c/o right sided abd pain x 3-4 days. Pt states pain is constant aching 7/10 pain. pt also c/o chronic constipation, takes Miralax nightly, admits to smaller BMs in the last few days. last BM 2 days ago. Pt denies N/V/D, fever/chills, urinary symptoms, chest pain, SOB.  PCP- Ruy Garcia   NAVA DIAZ is an 84 YO Male brought to the ED with complaining of right lower abdominal pain for 3-4 days.   Patient states that pain is constant aching 7/10 pain. Patient also has chronic constipation, takes Miralax nightly, admits to smaller BM in the last few days. Last BM 2 days ago. Patient denies N/V/D, fever/chills, urinary symptoms, chest pain, SOB.

## 2021-12-01 DIAGNOSIS — R10.31 RIGHT LOWER QUADRANT PAIN: ICD-10-CM

## 2021-12-01 LAB
ANION GAP SERPL CALC-SCNC: 5 MMOL/L — SIGNIFICANT CHANGE UP (ref 5–17)
BUN SERPL-MCNC: 25 MG/DL — HIGH (ref 7–23)
CALCIUM SERPL-MCNC: 8.3 MG/DL — LOW (ref 8.5–10.1)
CEA SERPL-MCNC: 3.1 NG/ML — SIGNIFICANT CHANGE UP (ref 0–3.8)
CHLORIDE SERPL-SCNC: 112 MMOL/L — HIGH (ref 96–108)
CO2 SERPL-SCNC: 26 MMOL/L — SIGNIFICANT CHANGE UP (ref 22–31)
CREAT SERPL-MCNC: 1.7 MG/DL — HIGH (ref 0.5–1.3)
GLUCOSE SERPL-MCNC: 98 MG/DL — SIGNIFICANT CHANGE UP (ref 70–99)
POTASSIUM SERPL-MCNC: 4.3 MMOL/L — SIGNIFICANT CHANGE UP (ref 3.5–5.3)
POTASSIUM SERPL-SCNC: 4.3 MMOL/L — SIGNIFICANT CHANGE UP (ref 3.5–5.3)
SODIUM SERPL-SCNC: 143 MMOL/L — SIGNIFICANT CHANGE UP (ref 135–145)
TROPONIN I, HIGH SENSITIVITY RESULT: 11.7 NG/L — SIGNIFICANT CHANGE UP

## 2021-12-01 PROCEDURE — 99232 SBSQ HOSP IP/OBS MODERATE 35: CPT

## 2021-12-01 PROCEDURE — 93010 ELECTROCARDIOGRAM REPORT: CPT

## 2021-12-01 RX ORDER — ENOXAPARIN SODIUM 100 MG/ML
40 INJECTION SUBCUTANEOUS ONCE
Refills: 0 | Status: COMPLETED | OUTPATIENT
Start: 2021-12-01 | End: 2021-12-01

## 2021-12-01 RX ORDER — SODIUM CHLORIDE 9 MG/ML
1000 INJECTION, SOLUTION INTRAVENOUS
Refills: 0 | Status: DISCONTINUED | OUTPATIENT
Start: 2021-12-01 | End: 2021-12-03

## 2021-12-01 RX ADMIN — POLYETHYLENE GLYCOL 3350 17 GRAM(S): 17 POWDER, FOR SOLUTION ORAL at 13:04

## 2021-12-01 RX ADMIN — PANTOPRAZOLE SODIUM 40 MILLIGRAM(S): 20 TABLET, DELAYED RELEASE ORAL at 13:04

## 2021-12-01 RX ADMIN — Medication 5 MILLIGRAM(S): at 06:25

## 2021-12-01 RX ADMIN — GABAPENTIN 100 MILLIGRAM(S): 400 CAPSULE ORAL at 06:29

## 2021-12-01 RX ADMIN — PIPERACILLIN AND TAZOBACTAM 25 GRAM(S): 4; .5 INJECTION, POWDER, LYOPHILIZED, FOR SOLUTION INTRAVENOUS at 21:26

## 2021-12-01 RX ADMIN — PIPERACILLIN AND TAZOBACTAM 25 GRAM(S): 4; .5 INJECTION, POWDER, LYOPHILIZED, FOR SOLUTION INTRAVENOUS at 06:29

## 2021-12-01 RX ADMIN — CARBIDOPA AND LEVODOPA 1.5 TABLET(S): 25; 100 TABLET ORAL at 13:04

## 2021-12-01 RX ADMIN — CARBIDOPA AND LEVODOPA 1.5 TABLET(S): 25; 100 TABLET ORAL at 21:29

## 2021-12-01 RX ADMIN — SENNA PLUS 2 TABLET(S): 8.6 TABLET ORAL at 21:28

## 2021-12-01 RX ADMIN — AMLODIPINE BESYLATE 2.5 MILLIGRAM(S): 2.5 TABLET ORAL at 06:27

## 2021-12-01 RX ADMIN — GABAPENTIN 100 MILLIGRAM(S): 400 CAPSULE ORAL at 13:08

## 2021-12-01 RX ADMIN — CARBIDOPA AND LEVODOPA 1.5 TABLET(S): 25; 100 TABLET ORAL at 06:24

## 2021-12-01 RX ADMIN — Medication 5 MILLIGRAM(S): at 17:04

## 2021-12-01 RX ADMIN — ENOXAPARIN SODIUM 40 MILLIGRAM(S): 100 INJECTION SUBCUTANEOUS at 17:03

## 2021-12-01 RX ADMIN — CARBIDOPA AND LEVODOPA 1.5 TABLET(S): 25; 100 TABLET ORAL at 00:07

## 2021-12-01 RX ADMIN — GABAPENTIN 100 MILLIGRAM(S): 400 CAPSULE ORAL at 21:23

## 2021-12-01 RX ADMIN — ATORVASTATIN CALCIUM 10 MILLIGRAM(S): 80 TABLET, FILM COATED ORAL at 21:28

## 2021-12-01 RX ADMIN — CALCITRIOL 0.25 MICROGRAM(S): 0.5 CAPSULE ORAL at 13:04

## 2021-12-01 RX ADMIN — SODIUM CHLORIDE 75 MILLILITER(S): 9 INJECTION, SOLUTION INTRAVENOUS at 13:03

## 2021-12-01 RX ADMIN — PIPERACILLIN AND TAZOBACTAM 25 GRAM(S): 4; .5 INJECTION, POWDER, LYOPHILIZED, FOR SOLUTION INTRAVENOUS at 13:03

## 2021-12-01 RX ADMIN — PRAMIPEXOLE DIHYDROCHLORIDE 1 MILLIGRAM(S): 0.12 TABLET ORAL at 13:04

## 2021-12-01 NOTE — CONSULT NOTE ADULT - ASSESSMENT
Prerenal azotemia, CKD 3  Hypertension   Prerenal azotemia, Likely CKD 3  Hypertension  Appendicitis    IV hydration. Check cultures, IV abx. Will follow creatinine trend. Check phos and iPTH. Pt on calcitriol as out pt.   Monitor BP trend. Titrate BP meds as needed. Will follow electrolytes and renal function trend.   Further recommendations pending clinical course. Thank you for the courtesy of this referral.

## 2021-12-01 NOTE — CONSULT NOTE ADULT - SUBJECTIVE AND OBJECTIVE BOX
CARDIOLOGY CONSULT NOTE    Patient is a 85y Male with a known history of :  Acute appendicitis [K35.80]    PD (Parkinson&#x27;s disease) [G20]    Essential hypertension [I10]    RLQ abdominal pain [R10.31]      HPI:  · Chief Complaint: The patient is a 85y Male complaining of abdominal pain.  · HPI Objective Statement: 84 yo M PMHx Parkinson's disease, HTN, HLD presents to ED c/o right sided abd pain x 3-4 days. Pt states pain is constant aching 7/10 pain. pt also c/o chronic constipation, takes Miralax nightly, admits to smaller BMs in the last few days. last BM 2 days ago. Pt denies N/V/D, fever/chills, urinary symptoms, chest pain, SOB.  PCP- Ruy Garcia   (30 Nov 2021 19:27)      REVIEW OF SYSTEMS:    CONSTITUTIONAL: No fever, weight loss, or fatigue  EYES: No eye pain, visual disturbances, or discharge  ENMT:  No difficulty hearing, tinnitus, vertigo; No sinus or throat pain  NECK: No pain or stiffness  BREASTS: No pain, masses, or nipple discharge  RESPIRATORY: No cough, wheezing, chills or hemoptysis; No shortness of breath  CARDIOVASCULAR: No chest pain, palpitations, dizziness, or leg swelling  GASTROINTESTINAL: No abdominal or epigastric pain. No nausea, vomiting, or hematemesis; No diarrhea or constipation. No melena or hematochezia.  GENITOURINARY: No dysuria, frequency, hematuria, or incontinence  NEUROLOGICAL: No headaches, memory loss, loss of strength, numbness, or tremors  SKIN: No itching, burning, rashes, or lesions   LYMPH NODES: No enlarged glands  ENDOCRINE: No heat or cold intolerance; No hair loss  MUSCULOSKELETAL: No joint pain or swelling; No muscle, back, or extremity pain  PSYCHIATRIC: No depression, anxiety, mood swings, or difficulty sleeping  HEME/LYMPH: No easy bruising, or bleeding gums  ALLERGY AND IMMUNOLOGIC: No hives or eczema    MEDICATIONS  (STANDING):  amLODIPine   Tablet 2.5 milliGRAM(s) Oral daily  atorvastatin 10 milliGRAM(s) Oral at bedtime  calcitriol   Capsule 0.25 MICROGram(s) Oral daily  carbidopa/levodopa CR 25/100 1.5 Tablet(s) Oral <User Schedule>  dextrose 5% + sodium chloride 0.9%. 1000 milliLiter(s) (75 mL/Hr) IV Continuous <Continuous>  gabapentin 100 milliGRAM(s) Oral three times a day  oxybutynin 5 milliGRAM(s) Oral two times a day  pantoprazole  Injectable 40 milliGRAM(s) IV Push daily  piperacillin/tazobactam IVPB.. 3.375 Gram(s) IV Intermittent every 8 hours  polyethylene glycol 3350 17 Gram(s) Oral daily  pramipexole 1 milliGRAM(s) Oral daily  senna 2 Tablet(s) Oral at bedtime    MEDICATIONS  (PRN):  bisacodyl 5 milliGRAM(s) Oral every 12 hours PRN Constipation  glycerin Suppository - Adult 1 Suppository(s) Rectal daily PRN Constipation  morphine  - Injectable 2 milliGRAM(s) IV Push every 6 hours PRN Severe Pain (7 - 10)  ondansetron Injectable 4 milliGRAM(s) IV Push four times a day PRN Nausea and/or Vomiting      ALLERGIES: No Known Allergies      FAMILY HISTORY:      PHYSICAL EXAMINATION:  -----------------------------  T(C): 37.5 (12-01-21 @ 04:54), Max: 37.5 (12-01-21 @ 04:54)  HR: 62 (12-01-21 @ 04:54) (62 - 85)  BP: 124/75 (12-01-21 @ 04:54) (124/75 - 156/85)  RR: 17 (12-01-21 @ 04:54) (17 - 19)  SpO2: 93% (12-01-21 @ 04:54) (93% - 97%)  Wt(kg): --    11-30 @ 07:01  -  12-01 @ 07:00  --------------------------------------------------------  IN:  Total IN: 0 mL    OUT:    Voided (mL): 350 mL  Total OUT: 350 mL    Total NET: -350 mL        Height (cm): 149.9 (11-30 @ 12:09)  Weight (kg): 75.1 (11-30 @ 22:32)  BMI (kg/m2): 33.4 (11-30 @ 22:32)  BSA (m2): 1.7 (11-30 @ 22:32)    Constitutional: well developed, normal appearance, well groomed, well nourished, no deformities and no acute distress.   Eyes: the conjunctiva exhibited no abnormalities and the eyelids demonstrated no xanthelasmas.   HEENT: normal oral mucosa, no oral pallor and no oral cyanosis.   Neck: normal jugular venous A waves present, normal jugular venous V waves present and no jugular venous manriquez A waves.   Pulmonary: no respiratory distress, normal respiratory rhythm and effort, no accessory muscle use and lungs were clear to auscultation bilaterally.   Cardiovascular: heart rate and rhythm were normal, normal S1 and S2 and no murmur, gallop, rub, heave or thrill are present.   Abdomen: soft, non-tender, no hepato-splenomegaly and no abdominal mass palpated.   Musculoskeletal: the gait could not be assessed..   Extremities: no clubbing of the fingernails, no localized cyanosis, no petechial hemorrhages and no ischemic changes.   Skin: normal skin color and pigmentation, no rash, no venous stasis, no skin lesions, no skin ulcer and no xanthoma was observed.   Psychiatric: oriented to person, place, and time, the affect was normal, the mood was normal and not feeling anxious.     LABS:   --------  12-01    143  |  112<H>  |  25<H>  ----------------------------<  98  4.3   |  26  |  1.70<H>    Ca    8.3<L>      01 Dec 2021 07:10    TPro  6.8  /  Alb  3.0<L>  /  TBili  0.5  /  DBili  x   /  AST  13<L>  /  ALT  10<L>  /  AlkPhos  55  11-30                         13.3   9.99  )-----------( 182      ( 30 Nov 2021 12:59 )             37.9                 RADIOLOGY:  -----------------        ECG:  CARDIOLOGY CONSULT NOTE    Patient is a 85y Male with a known history of :  Acute appendicitis [K35.80]    PD (Parkinson&#x27;s disease) [G20]    Essential hypertension [I10]    RLQ abdominal pain [R10.31]      HPI:  · Chief Complaint: The patient is a 85y Male complaining of abdominal pain.  · HPI Objective Statement: 86 yo M PMHx Parkinson's disease, HTN, HLD presents to ED c/o right sided abd pain x 3-4 days. Pt states pain is constant aching 7/10 pain. pt also c/o chronic constipation, takes Miralax nightly, admits to smaller BMs in the last few days. last BM 2 days ago. Pt denies N/V/D, fever/chills, urinary symptoms, chest pain, SOB.  PCP- Ruy Garcia   (30 Nov 2021 19:27)      REVIEW OF SYSTEMS:    CONSTITUTIONAL: No fever, +fatigue  NECK: No pain or stiffness  RESPIRATORY: No cough, wheezing, chills or hemoptysis; No shortness of breath  CARDIOVASCULAR: No, palpitations, dizziness, or leg swelling, +CP for one week  GASTROINTESTINAL: HPI  NEUROLOGICAL:  slight mild cognitive dysfunction  LYMPH NODES: No enlarged glands  MUSCULOSKELETAL:  back pain  HEME/LYMPH: No easy bruising, or bleeding gums  ALLERGY AND IMMUNOLOGIC: No hives or eczema    MEDICATIONS  (STANDING):  amLODIPine   Tablet 2.5 milliGRAM(s) Oral daily  atorvastatin 10 milliGRAM(s) Oral at bedtime  calcitriol   Capsule 0.25 MICROGram(s) Oral daily  carbidopa/levodopa CR 25/100 1.5 Tablet(s) Oral <User Schedule>  dextrose 5% + sodium chloride 0.9%. 1000 milliLiter(s) (75 mL/Hr) IV Continuous <Continuous>  gabapentin 100 milliGRAM(s) Oral three times a day  oxybutynin 5 milliGRAM(s) Oral two times a day  pantoprazole  Injectable 40 milliGRAM(s) IV Push daily  piperacillin/tazobactam IVPB.. 3.375 Gram(s) IV Intermittent every 8 hours  polyethylene glycol 3350 17 Gram(s) Oral daily  pramipexole 1 milliGRAM(s) Oral daily  senna 2 Tablet(s) Oral at bedtime    MEDICATIONS  (PRN):  bisacodyl 5 milliGRAM(s) Oral every 12 hours PRN Constipation  glycerin Suppository - Adult 1 Suppository(s) Rectal daily PRN Constipation  morphine  - Injectable 2 milliGRAM(s) IV Push every 6 hours PRN Severe Pain (7 - 10)  ondansetron Injectable 4 milliGRAM(s) IV Push four times a day PRN Nausea and/or Vomiting      ALLERGIES: No Known Allergies      FAMILY HISTORY:      PHYSICAL EXAMINATION:  -----------------------------  T(C): 37.5 (12-01-21 @ 04:54), Max: 37.5 (12-01-21 @ 04:54)  HR: 62 (12-01-21 @ 04:54) (62 - 85)  BP: 124/75 (12-01-21 @ 04:54) (124/75 - 156/85)  RR: 17 (12-01-21 @ 04:54) (17 - 19)  SpO2: 93% (12-01-21 @ 04:54) (93% - 97%)  Wt(kg): --    11-30 @ 07:01  -  12-01 @ 07:00  --------------------------------------------------------  IN:  Total IN: 0 mL    OUT:    Voided (mL): 350 mL  Total OUT: 350 mL    Total NET: -350 mL        Height (cm): 149.9 (11-30 @ 12:09)  Weight (kg): 75.1 (11-30 @ 22:32)  BMI (kg/m2): 33.4 (11-30 @ 22:32)  BSA (m2): 1.7 (11-30 @ 22:32)    Constitutional: well developed, normal appearance, and no acute distress.   HEENT: normal oral mucosa, no oral pallor and no oral cyanosis.   Neck: normal jugular venous  Pulmonary: no respiratory distress, lungs were clear to auscultation bilaterally.   Cardiovascular: heart rate and rhythm were normal, normal S1 and S2 and no murmur, gallop, rub,  present.   Abdomen: soft, non-tender  Musculoskeletal: the gait could not be assessed..   Extremities: no edema, no ischemic changes.   Skin: no rash, no venous stasis, no skin lesions, no skin ulcer       LABS:   --------  12-01    143  |  112<H>  |  25<H>  ----------------------------<  98  4.3   |  26  |  1.70<H>    Ca    8.3<L>      01 Dec 2021 07:10    TPro  6.8  /  Alb  3.0<L>  /  TBili  0.5  /  DBili  x   /  AST  13<L>  /  ALT  10<L>  /  AlkPhos  55  11-30                         13.3   9.99  )-----------( 182      ( 30 Nov 2021 12:59 )             37.9                 RADIOLOGY:  -----------------        ECG:  not available

## 2021-12-01 NOTE — CONSULT NOTE ADULT - SUBJECTIVE AND OBJECTIVE BOX
Patient is a 85y old  Male who presents with a chief complaint of Acute appendicitis (2021 19:27)    HPI:  NAVA DIAZ is an 86 YO Male brought to the ED with complaining of right lower abdominal pain for 3-4 days.   Patient states that pain is constant aching 7/10 pain.   Patient also has chronic constipation, takes Miralax nightly, admits to smaller BM in the last few days. Last BM 2 days ago. Patient denies N/V/D, fever/chills, urinary symptoms, chest pain, SOB.   (2021 19:27)    Renal consult called for CKD. History obtained from chart and patient.     PAST MEDICAL HISTORY:  Parkinson disease    Shingles    HTN (hypertension)    Hypercholesteremia    Mild asthma        PAST SURGICAL HISTORY:  No significant past surgical history        FAMILY HISTORY:  No pertinent family history in first degree relatives        SOCIAL HISTORY: No smoking or alcohol use     Allergies    No Known Allergies    Intolerances      Home Medications:  amLODIPine 2.5 mg oral tablet: 1 tab(s) orally once a day (in the morning) (2021 18:47)  atorvastatin 10 mg oral tablet: 1 tab(s) orally once a day (2021 18:47)  calcitriol 0.25 mcg oral capsule: 1 cap(s) orally every other day (2021 18:47)  carbidopa-levodopa 25 mg-100 mg oral tablet, extended release: 1.5 tab(s) orally 3 times a day at 8a, 12p, 6p (2021 18:47)  Linzess 145 mcg oral capsule: 1 cap(s) orally once a day (2021 18:47)  pramipexole 1 mg oral tablet: 1 tab(s) orally 3 times a day at 8a 12p, 6p (2021 18:47)  rivastigmine 4.6 mg/24 hr transdermal film, extended release: 1 patch transdermal once a day (2021 18:47)  VESIcare 5 mg oral tablet: 1 tab(s) orally once a day (2021 18:47)    MEDICATIONS  (STANDING):  amLODIPine   Tablet 2.5 milliGRAM(s) Oral daily  atorvastatin 10 milliGRAM(s) Oral at bedtime  calcitriol   Capsule 0.25 MICROGram(s) Oral daily  carbidopa/levodopa CR 25/100 1.5 Tablet(s) Oral <User Schedule>  dextrose 5% + sodium chloride 0.9%. 1000 milliLiter(s) (75 mL/Hr) IV Continuous <Continuous>  gabapentin 100 milliGRAM(s) Oral three times a day  oxybutynin 5 milliGRAM(s) Oral two times a day  pantoprazole  Injectable 40 milliGRAM(s) IV Push daily  piperacillin/tazobactam IVPB.. 3.375 Gram(s) IV Intermittent every 8 hours  polyethylene glycol 3350 17 Gram(s) Oral daily  pramipexole 1 milliGRAM(s) Oral daily  senna 2 Tablet(s) Oral at bedtime    MEDICATIONS  (PRN):  bisacodyl 5 milliGRAM(s) Oral every 12 hours PRN Constipation  glycerin Suppository - Adult 1 Suppository(s) Rectal daily PRN Constipation  morphine  - Injectable 2 milliGRAM(s) IV Push every 6 hours PRN Severe Pain (7 - 10)  ondansetron Injectable 4 milliGRAM(s) IV Push four times a day PRN Nausea and/or Vomiting      REVIEW OF SYSTEMS:  General: no distress  Respiratory: No cough, SOB  Cardiovascular: No CP or Palpitations	  Gastrointestinal: No nausea, Vomiting. No diarrhea  Genitourinary: No urinary complaints	  Musculoskeletal: No new rash or lesions	  all other systems negative    T(F): 99.5 (21 @ 04:54), Max: 99.5 (21 @ 04:54)  HR: 62 (21 @ 04:54) (62 - 85)  BP: 124/75 (21 @ 04:54) (124/75 - 156/85)  RR: 17 (21 @ 04:54) (17 - 19)  SpO2: 93% (21 @ 04:54) (93% - 97%)  Wt(kg): --    PHYSICAL EXAM:  General: NAD  Respiratory: b/l air entry  Cardiovascular: S1 S2  Gastrointestinal: soft  Extremities: no edema            143  |  112<H>  |  25<H>  ----------------------------<  98  4.3   |  26  |  1.70<H>    Ca    8.3<L>      01 Dec 2021 07:10    T Pro  6.8  /  Alb  3.0<L>  /  T Bili  0.5  /  D Bili  x   /  AST  13<L>  /  ALT  10<L>  /  Alk Phos  55                            13.3   9.99  )-----------( 182      ( 2021 12:59 )             37.9       Potassium, Serum: 4.3 mmol/L ( @ 07:10)  Blood Urea Nitrogen, Serum: 25 mg/dL ( @ 07:10)  Calcium, Total Serum: 8.3 mg/dL ( @ 07:10)  Potassium, Serum: 4.3 mmol/L ( @ 12:59)      Creatinine, Serum: 1.70 ( @ 07:10)  Creatinine, Serum: 1.80 ( @ 12:59)      Urinalysis Basic - ( 2021 19:26 )    Color: Pale Yellow / Appearance: Clear / S.010 / pH: x  Gluc: x / Ketone: Negative  / Bili: Negative / Urobili: Negative   Blood: x / Protein: Negative / Nitrite: Negative   Leuk Esterase: Negative / RBC: x / WBC x   Sq Epi: x / Non Sq Epi: x / Bacteria: x      LIVER FUNCTIONS - ( 2021 12:59 )  Alb: 3.0 g/dL / Pro: 6.8 g/dL / ALK PHOS: 55 U/L / ALT: 10 U/L / AST: 13 U/L / GGT: x                 I&O's Detail    2021 07:01  -  01 Dec 2021 07:00  --------------------------------------------------------  IN:  Total IN: 0 mL    OUT:    Voided (mL): 350 mL  Total OUT: 350 mL    Total NET: -350 mL      < from: CT Abdomen and Pelvis w/ Oral Cont (21 @ 15:59) >    EXAM:  CT ABDOMEN AND PELVIS OC                            PROCEDURE DATE:  2021          INTERPRETATION:  CLINICAL INFORMATION: Right-sided abdominal pain. Shingles.    COMPARISON: Reference is made to MRI 2019 and CT scan 3/14/2018.    CONTRAST/COMPLICATIONS:  IV Contrast: NONE  0 cc administered   0 cc discarded  Oral Contrast: Omnipaque 300  Complications: None reported at time of study completion    PROCEDURE:  CT of the Abdomen and Pelvis was performed.  Sagittal and coronal reformats were performed.    FINDINGS:  LOWER CHEST: Cardiomegaly. Dependent atelectasis.    LIVER: Punctate calcification in the right hepatic lobe.  BILE DUCTS: Normal caliber.  GALLBLADDER: Within normal limits.  SPLEEN: Within normal limits.  PANCREAS: Within normal limits.  ADRENALS: Within normal limits.  KIDNEYS/URETERS: Bilateral renal lesions corresponding with hemorrhagic and nonhemorrhagic cysts present on prior MRI. No calculus or hydronephrosis.    BLADDER: Within normal limits.  REPRODUCTIVE ORGANS: Status post TURP.    BOWEL: No bowel obstruction. Soft tissue mass at the base of the appendix measuring 3.4 cm in diameter with dilatation of the appendix and periappendiceal fat stranding.(2:78 and 602:35). Diverticulosis, without acute diverticulitis.  PERITONEUM: No ascites.  VESSELS: Atherosclerotic calcifications.  RETROPERITONEUM/LYMPH NODES: No lymphadenopathy.  ABDOMINAL WALL: Within normal limits.  BONES: Degenerative changes.    IMPRESSION:  Acute nonperforated appendicitis with soft tissue mass at the base of the appendix measuring approximately 3.4 cm in diameter. Findings are concerning for malignancy.        --- End of Report ---            ELIU ALANIS MD; Attending Radiologist  This document has been electronically signed. 2021  4:25PM    < end of copied text >             Patient is a 85y old  Male who presents with a chief complaint of Acute appendicitis (2021 19:27)    HPI:  NAVA DIAZ is an 86 YO Male brought to the ED with complaining of right lower abdominal pain for 3-4 days.   Patient states that pain is constant aching 7/10 pain.   Patient also has chronic constipation, takes Miralax nightly, admits to smaller BM in the last few days. Last BM 2 days ago. Patient denies N/V/D, fever/chills, urinary symptoms, chest pain, SOB.   (2021 19:27)    Renal consult called for CKD. History obtained from chart and patient. Pt denies any history of kidney disease.     PAST MEDICAL HISTORY:  Parkinson disease    Shingles    HTN (hypertension)    Hypercholesteremia    Mild asthma        PAST SURGICAL HISTORY:  No significant past surgical history        FAMILY HISTORY:  No pertinent family history in first degree relatives        SOCIAL HISTORY: No smoking or alcohol use     Allergies    No Known Allergies    Intolerances      Home Medications:  amLODIPine 2.5 mg oral tablet: 1 tab(s) orally once a day (in the morning) (2021 18:47)  atorvastatin 10 mg oral tablet: 1 tab(s) orally once a day (2021 18:47)  calcitriol 0.25 mcg oral capsule: 1 cap(s) orally every other day (2021 18:47)  carbidopa-levodopa 25 mg-100 mg oral tablet, extended release: 1.5 tab(s) orally 3 times a day at 8a, 12p, 6p (2021 18:47)  Linzess 145 mcg oral capsule: 1 cap(s) orally once a day (2021 18:47)  pramipexole 1 mg oral tablet: 1 tab(s) orally 3 times a day at 8a 12p, 6p (2021 18:47)  rivastigmine 4.6 mg/24 hr transdermal film, extended release: 1 patch transdermal once a day (2021 18:47)  VESIcare 5 mg oral tablet: 1 tab(s) orally once a day (2021 18:47)    MEDICATIONS  (STANDING):  amLODIPine   Tablet 2.5 milliGRAM(s) Oral daily  atorvastatin 10 milliGRAM(s) Oral at bedtime  calcitriol   Capsule 0.25 MICROGram(s) Oral daily  carbidopa/levodopa CR 25/100 1.5 Tablet(s) Oral <User Schedule>  dextrose 5% + sodium chloride 0.9%. 1000 milliLiter(s) (75 mL/Hr) IV Continuous <Continuous>  gabapentin 100 milliGRAM(s) Oral three times a day  oxybutynin 5 milliGRAM(s) Oral two times a day  pantoprazole  Injectable 40 milliGRAM(s) IV Push daily  piperacillin/tazobactam IVPB.. 3.375 Gram(s) IV Intermittent every 8 hours  polyethylene glycol 3350 17 Gram(s) Oral daily  pramipexole 1 milliGRAM(s) Oral daily  senna 2 Tablet(s) Oral at bedtime    MEDICATIONS  (PRN):  bisacodyl 5 milliGRAM(s) Oral every 12 hours PRN Constipation  glycerin Suppository - Adult 1 Suppository(s) Rectal daily PRN Constipation  morphine  - Injectable 2 milliGRAM(s) IV Push every 6 hours PRN Severe Pain (7 - 10)  ondansetron Injectable 4 milliGRAM(s) IV Push four times a day PRN Nausea and/or Vomiting      REVIEW OF SYSTEMS:  General: no distress  Respiratory: No cough, SOB  Cardiovascular: No CP or Palpitations	  Gastrointestinal: No nausea, Vomiting. No diarrhea  Genitourinary: No urinary complaints	  Musculoskeletal: No new rash or lesions	  all other systems negative    T(F): 99.5 (21 @ 04:54), Max: 99.5 (21 @ 04:54)  HR: 62 (21 @ 04:54) (62 - 85)  BP: 124/75 (21 @ 04:54) (124/75 - 156/85)  RR: 17 (21 @ 04:54) (17 - 19)  SpO2: 93% (21 @ 04:54) (93% - 97%)  Wt(kg): --    PHYSICAL EXAM:  General: NAD  Respiratory: b/l air entry  Cardiovascular: S1 S2  Gastrointestinal: soft  Extremities: no edema            143  |  112<H>  |  25<H>  ----------------------------<  98  4.3   |  26  |  1.70<H>    Ca    8.3<L>      01 Dec 2021 07:10    T Pro  6.8  /  Alb  3.0<L>  /  T Bili  0.5  /  D Bili  x   /  AST  13<L>  /  ALT  10<L>  /  Alk Phos  55                            13.3   9.99  )-----------( 182      ( 2021 12:59 )             37.9       Potassium, Serum: 4.3 mmol/L ( @ 07:10)  Blood Urea Nitrogen, Serum: 25 mg/dL ( @ 07:10)  Calcium, Total Serum: 8.3 mg/dL ( @ 07:10)  Potassium, Serum: 4.3 mmol/L ( @ 12:59)      Creatinine, Serum: 1.70 ( @ 07:10)  Creatinine, Serum: 1.80 ( @ 12:59)      Urinalysis Basic - ( 2021 19:26 )    Color: Pale Yellow / Appearance: Clear / S.010 / pH: x  Gluc: x / Ketone: Negative  / Bili: Negative / Urobili: Negative   Blood: x / Protein: Negative / Nitrite: Negative   Leuk Esterase: Negative / RBC: x / WBC x   Sq Epi: x / Non Sq Epi: x / Bacteria: x      LIVER FUNCTIONS - ( 2021 12:59 )  Alb: 3.0 g/dL / Pro: 6.8 g/dL / ALK PHOS: 55 U/L / ALT: 10 U/L / AST: 13 U/L / GGT: x                 I&O's Detail    2021 07:01  -  01 Dec 2021 07:00  --------------------------------------------------------  IN:  Total IN: 0 mL    OUT:    Voided (mL): 350 mL  Total OUT: 350 mL    Total NET: -350 mL      < from: CT Abdomen and Pelvis w/ Oral Cont (21 @ 15:59) >    EXAM:  CT ABDOMEN AND PELVIS OC                            PROCEDURE DATE:  2021          INTERPRETATION:  CLINICAL INFORMATION: Right-sided abdominal pain. Shingles.    COMPARISON: Reference is made to MRI 2019 and CT scan 3/14/2018.    CONTRAST/COMPLICATIONS:  IV Contrast: NONE  0 cc administered   0 cc discarded  Oral Contrast: Omnipaque 300  Complications: None reported at time of study completion    PROCEDURE:  CT of the Abdomen and Pelvis was performed.  Sagittal and coronal reformats were performed.    FINDINGS:  LOWER CHEST: Cardiomegaly. Dependent atelectasis.    LIVER: Punctate calcification in the right hepatic lobe.  BILE DUCTS: Normal caliber.  GALLBLADDER: Within normal limits.  SPLEEN: Within normal limits.  PANCREAS: Within normal limits.  ADRENALS: Within normal limits.  KIDNEYS/URETERS: Bilateral renal lesions corresponding with hemorrhagic and nonhemorrhagic cysts present on prior MRI. No calculus or hydronephrosis.    BLADDER: Within normal limits.  REPRODUCTIVE ORGANS: Status post TURP.    BOWEL: No bowel obstruction. Soft tissue mass at the base of the appendix measuring 3.4 cm in diameter with dilatation of the appendix and periappendiceal fat stranding.(2:78 and 602:35). Diverticulosis, without acute diverticulitis.  PERITONEUM: No ascites.  VESSELS: Atherosclerotic calcifications.  RETROPERITONEUM/LYMPH NODES: No lymphadenopathy.  ABDOMINAL WALL: Within normal limits.  BONES: Degenerative changes.    IMPRESSION:  Acute nonperforated appendicitis with soft tissue mass at the base of the appendix measuring approximately 3.4 cm in diameter. Findings are concerning for malignancy.        --- End of Report ---            ELIU ALANIS MD; Attending Radiologist  This document has been electronically signed. 2021  4:25PM    < end of copied text >

## 2021-12-01 NOTE — PROGRESS NOTE ADULT - ASSESSMENT
84 YO Male presented to ED c/o R sided abdominal pain, found to have acute non-perforated appendicitis with a mass at the base of the appendix with concern for malignancy. WBC normal as of yesterday.

## 2021-12-01 NOTE — PROGRESS NOTE ADULT - SUBJECTIVE AND OBJECTIVE BOX
Patient is a 85y old  Male who presents with a chief complaint of Acute appendicitis (01 Dec 2021 11:17)  comfortable presently      INTERVAL HPI/OVERNIGHT EVENTS:  T(C): 36.6 (21 @ 11:50), Max: 37.5 (21 @ 04:54)  HR: 64 (21 @ 11:50) (62 - 85)  BP: 131/79 (21 @ 11:50) (124/75 - 156/85)  RR: 18 (21 @ 11:50) (17 - 19)  SpO2: 94% (21 @ 11:50) (93% - 97%)  Wt(kg): --  I&O's Summary    2021 07:01  -  01 Dec 2021 07:00  --------------------------------------------------------  IN: 0 mL / OUT: 350 mL / NET: -350 mL        LABS:                        13.3   9.99  )-----------( 182      ( 2021 12:59 )             37.9     12-    143  |  112<H>  |  25<H>  ----------------------------<  98  4.3   |  26  |  1.70<H>    Ca    8.3<L>      01 Dec 2021 07:10    TPro  6.8  /  Alb  3.0<L>  /  TBili  0.5  /  DBili  x   /  AST  13<L>  /  ALT  10<L>  /  AlkPhos  55  11      Urinalysis Basic - ( 2021 19:26 )    Color: Pale Yellow / Appearance: Clear / S.010 / pH: x  Gluc: x / Ketone: Negative  / Bili: Negative / Urobili: Negative   Blood: x / Protein: Negative / Nitrite: Negative   Leuk Esterase: Negative / RBC: x / WBC x   Sq Epi: x / Non Sq Epi: x / Bacteria: x      CAPILLARY BLOOD GLUCOSE            Urinalysis Basic - ( 2021 19:26 )    Color: Pale Yellow / Appearance: Clear / S.010 / pH: x  Gluc: x / Ketone: Negative  / Bili: Negative / Urobili: Negative   Blood: x / Protein: Negative / Nitrite: Negative   Leuk Esterase: Negative / RBC: x / WBC x   Sq Epi: x / Non Sq Epi: x / Bacteria: x        MEDICATIONS  (STANDING):  amLODIPine   Tablet 2.5 milliGRAM(s) Oral daily  atorvastatin 10 milliGRAM(s) Oral at bedtime  calcitriol   Capsule 0.25 MICROGram(s) Oral daily  carbidopa/levodopa CR 25/100 1.5 Tablet(s) Oral <User Schedule>  dextrose 5% + sodium chloride 0.45%. 1000 milliLiter(s) (75 mL/Hr) IV Continuous <Continuous>  enoxaparin Injectable 40 milliGRAM(s) SubCutaneous once  gabapentin 100 milliGRAM(s) Oral three times a day  oxybutynin 5 milliGRAM(s) Oral two times a day  pantoprazole  Injectable 40 milliGRAM(s) IV Push daily  piperacillin/tazobactam IVPB.. 3.375 Gram(s) IV Intermittent every 8 hours  polyethylene glycol 3350 17 Gram(s) Oral daily  pramipexole 1 milliGRAM(s) Oral daily  senna 2 Tablet(s) Oral at bedtime    MEDICATIONS  (PRN):  bisacodyl 5 milliGRAM(s) Oral every 12 hours PRN Constipation  glycerin Suppository - Adult 1 Suppository(s) Rectal daily PRN Constipation  morphine  - Injectable 2 milliGRAM(s) IV Push every 6 hours PRN Severe Pain (7 - 10)  ondansetron Injectable 4 milliGRAM(s) IV Push four times a day PRN Nausea and/or Vomiting      REVIEW OF SYSTEMS:  CONSTITUTIONAL: No fever, weight loss, or fatigue  EYES: No eye pain, visual disturbances, or discharge  ENMT:  No difficulty hearing, tinnitus, vertigo; No sinus or throat pain  NECK: No pain or stiffness  RESPIRATORY: No cough, wheezing, chills or hemoptysis; No shortness of breath  CARDIOVASCULAR: No chest pain, palpitations, dizziness, or leg swelling  GASTROINTESTINAL: abd discomfort  GENITOURINARY: No dysuria, frequency, hematuria, or incontinence  NEUROLOGICAL: No headaches, memory loss, loss of strength, numbness, or tremors  SKIN: No itching, burning, rashes, or lesions   LYMPH NODES: No enlarged glands  ENDOCRINE: No heat or cold intolerance; No hair loss  MUSCULOSKELETAL: No joint pain or swelling; No muscle, back, or extremity pain  PSYCHIATRIC: No depression, anxiety, mood swings, or difficulty sleeping  HEME/LYMPH: No easy bruising, or bleeding gums  ALLERY AND IMMUNOLOGIC: No hives or eczema    RADIOLOGY & ADDITIONAL TESTS:    Imaging Personally Reviewed:  [x ] YES  [ ] NO    Consultant(s) Notes Reviewed:  [x ] YES  [ ] NO    PHYSICAL EXAM:  GENERAL: NAD, well-groomed, well-developed  HEAD:  Atraumatic, Normocephalic  EYES: EOMI, PERRLA, conjunctiva and sclera clear  ENMT: No tonsillar erythema, exudates, or enlargement; Moist mucous membranes, Good dentition, No lesions  NECK: Supple, No JVD, Normal thyroid  NERVOUS SYSTEM:  Alert & Oriented X3, Good concentration; Motor Strength 5/5 B/L upper and lower extremities; DTRs 2+ intact and symmetric  CHEST/LUNG: Clear to percussion bilaterally; No rales, rhonchi, wheezing, or rubs  HEART: Regular rate and rhythm; No murmurs, rubs, or gallops  ABDOMEN: mild r sided tenderness to palp, no guarding, no rebound  EXTREMITIES:  2+ Peripheral Pulses, No clubbing, cyanosis, or edema  LYMPH: No lymphadenopathy noted  SKIN: No rashes or lesions    Care Discussed with Consultants/Other Providers [x ] YES  [ ] NO

## 2021-12-01 NOTE — PROGRESS NOTE ADULT - ASSESSMENT
colonic mass vs appendicitis    F/u CEA level  surgery team recs noted, appreciated  oncology to see pt  cardiology consult noted, will require EKG, one trop check, and echo prior to colonoscopy  will discuss possible timing for colonoscopy with attending  no plan for colonoscopy today given pending cardio clearance and pt is not prepped  will follow    Advanced care planning was discussed with patient and family.  Advanced care planning forms were reviewed and discussed.  Risks, benefits and alternatives of gastroenterologic procedures were discussed in detail and all questions were answered.    30 minutes spent. colonic mass vs appendicitis    F/u CEA level  surgery team recs noted, appreciated  oncology to see pt  cardiology consult noted, will require EKG, one trop check, and echo prior to colonoscopy  tentatively scheduled colonocopy for Friday 12/3 given cardio clearance is complete  no plan for colonoscopy today given pending cardio clearance and pt is not prepped  will follow    Advanced care planning was discussed with patient and family.  Advanced care planning forms were reviewed and discussed.  Risks, benefits and alternatives of gastroenterologic procedures were discussed in detail and all questions were answered.    30 minutes spent.

## 2021-12-01 NOTE — PROGRESS NOTE ADULT - SUBJECTIVE AND OBJECTIVE BOX
SUBJECTIVE: Patient seen and examined at bedside. Patient somnolent but states that he still has some RLQ abdominal pain, improved from earlier. Denies N/V    Vital Signs Last 24 Hrs  T(C): 37.5 (01 Dec 2021 04:54), Max: 37.5 (01 Dec 2021 04:54)  T(F): 99.5 (01 Dec 2021 04:54), Max: 99.5 (01 Dec 2021 04:54)  HR: 62 (01 Dec 2021 04:54) (62 - 85)  BP: 124/75 (01 Dec 2021 04:54) (124/75 - 156/85)  BP(mean): --  RR: 17 (01 Dec 2021 04:54) (17 - 19)  SpO2: 93% (01 Dec 2021 04:54) (93% - 97%)    PHYSICAL EXAM:  GENERAL: No acute distress, well-developed  HEAD:  Atraumatic, Normocephalic  ABDOMEN: Soft, non-distended, ttp of RLQ  NEUROLOGY: A&O x 3, no focal deficits    I&O's Summary    MEDICATIONS  (STANDING):  amLODIPine   Tablet 2.5 milliGRAM(s) Oral daily  atorvastatin 10 milliGRAM(s) Oral at bedtime  calcitriol   Capsule 0.25 MICROGram(s) Oral daily  carbidopa/levodopa CR 25/100 1.5 Tablet(s) Oral <User Schedule>  dextrose 5% + sodium chloride 0.9%. 1000 milliLiter(s) (75 mL/Hr) IV Continuous <Continuous>  gabapentin 100 milliGRAM(s) Oral three times a day  oxybutynin 5 milliGRAM(s) Oral two times a day  pantoprazole  Injectable 40 milliGRAM(s) IV Push daily  piperacillin/tazobactam IVPB.. 3.375 Gram(s) IV Intermittent every 8 hours  polyethylene glycol 3350 17 Gram(s) Oral daily  pramipexole 1 milliGRAM(s) Oral daily  senna 2 Tablet(s) Oral at bedtime    MEDICATIONS  (PRN):  bisacodyl 5 milliGRAM(s) Oral every 12 hours PRN Constipation  glycerin Suppository - Adult 1 Suppository(s) Rectal daily PRN Constipation  morphine  - Injectable 2 milliGRAM(s) IV Push every 6 hours PRN Severe Pain (7 - 10)  ondansetron Injectable 4 milliGRAM(s) IV Push four times a day PRN Nausea and/or Vomiting    LABS: AM labs pending    RADIOLOGY & ADDITIONAL STUDIES:  < from: CT Abdomen and Pelvis w/ Oral Cont (11.30.21 @ 15:59) >  FINDINGS:  LOWER CHEST: Cardiomegaly. Dependent atelectasis.    LIVER: Punctate calcification in the right hepatic lobe.  BILE DUCTS: Normal caliber.  GALLBLADDER: Within normal limits.  SPLEEN: Within normal limits.  PANCREAS: Within normal limits.  ADRENALS: Within normal limits.  KIDNEYS/URETERS: Bilateral renal lesions corresponding with hemorrhagic and nonhemorrhagic cysts present on prior MRI. No calculus or hydronephrosis.    BLADDER: Within normal limits.  REPRODUCTIVE ORGANS: Status post TURP.    BOWEL: No bowel obstruction. Soft tissue mass at the base of the appendix measuring 3.4 cm in diameter with dilatation of the appendix and periappendiceal fat stranding.(2:78 and 602:35). Diverticulosis, without acute diverticulitis.  PERITONEUM: No ascites.  VESSELS: Atherosclerotic calcifications.  RETROPERITONEUM/LYMPH NODES: No lymphadenopathy.  ABDOMINAL WALL: Within normal limits.  BONES: Degenerative changes.    IMPRESSION:  Acute nonperforated appendicitis with soft tissue mass at the base of the appendix measuring approximately 3.4 cm in diameter. Findings are concerning for malignancy.

## 2021-12-01 NOTE — PROGRESS NOTE ADULT - SUBJECTIVE AND OBJECTIVE BOX
[facility]  Dakota Díaz PA-C  [facility] [place] [address]  [phone]      INTERVAL HPI/OVERNIGHT EVENTS:  Pt s/e  Pt states he has some abdominal pain but otherwise no further GI complaints    MEDICATIONS  (STANDING):  amLODIPine   Tablet 2.5 milliGRAM(s) Oral daily  atorvastatin 10 milliGRAM(s) Oral at bedtime  calcitriol   Capsule 0.25 MICROGram(s) Oral daily  carbidopa/levodopa CR 25/100 1.5 Tablet(s) Oral <User Schedule>  dextrose 5% + sodium chloride 0.9%. 1000 milliLiter(s) (75 mL/Hr) IV Continuous <Continuous>  gabapentin 100 milliGRAM(s) Oral three times a day  oxybutynin 5 milliGRAM(s) Oral two times a day  pantoprazole  Injectable 40 milliGRAM(s) IV Push daily  piperacillin/tazobactam IVPB.. 3.375 Gram(s) IV Intermittent every 8 hours  polyethylene glycol 3350 17 Gram(s) Oral daily  pramipexole 1 milliGRAM(s) Oral daily  senna 2 Tablet(s) Oral at bedtime    MEDICATIONS  (PRN):  bisacodyl 5 milliGRAM(s) Oral every 12 hours PRN Constipation  glycerin Suppository - Adult 1 Suppository(s) Rectal daily PRN Constipation  morphine  - Injectable 2 milliGRAM(s) IV Push every 6 hours PRN Severe Pain (7 - 10)  ondansetron Injectable 4 milliGRAM(s) IV Push four times a day PRN Nausea and/or Vomiting      Allergies    No Known Allergies    Intolerances      PHYSICAL EXAM:   Vital Signs:  Vital Signs Last 24 Hrs  T(C): 37.5 (01 Dec 2021 04:54), Max: 37.5 (01 Dec 2021 04:54)  T(F): 99.5 (01 Dec 2021 04:54), Max: 99.5 (01 Dec 2021 04:54)  HR: 62 (01 Dec 2021 04:54) (62 - 85)  BP: 124/75 (01 Dec 2021 04:54) (124/75 - 156/85)  BP(mean): --  RR: 17 (01 Dec 2021 04:54) (17 - 19)  SpO2: 93% (01 Dec 2021 04:54) (93% - 97%)  Daily Height in cm: 149.86 (2021 12:09)    Daily Weight in k (01 Dec 2021 04:54)    GENERAL:  Appears stated age,   HEENT:  NC/AT,    CHEST:  Full & symmetric excursion,   HEART:  Regular rhythm,  ABDOMEN:  Soft, +TTP in RLQ, non-distended,  EXTEREMITIES:  no cyanosis  SKIN:  No rash  NEURO:  Alert,       LABS:                        13.3   9.99  )-----------( 182      ( 2021 12:59 )             37.9     12    143  |  112<H>  |  25<H>  ----------------------------<  98  4.3   |  26  |  1.70<H>    Ca    8.3<L>      01 Dec 2021 07:10    TPro  6.8  /  Alb  3.0<L>  /  TBili  0.5  /  DBili  x   /  AST  13<L>  /  ALT  10<L>  /  AlkPhos  55        Urinalysis Basic - ( 2021 19:26 )    Color: Pale Yellow / Appearance: Clear / S.010 / pH: x  Gluc: x / Ketone: Negative  / Bili: Negative / Urobili: Negative   Blood: x / Protein: Negative / Nitrite: Negative   Leuk Esterase: Negative / RBC: x / WBC x   Sq Epi: x / Non Sq Epi: x / Bacteria: x        RADIOLOGY & ADDITIONAL TESTS:  < from: CT Abdomen and Pelvis w/ Oral Cont (21 @ 15:59) >    EXAM:  CT ABDOMEN AND PELVIS OC                            PROCEDURE DATE:  2021          INTERPRETATION:  CLINICAL INFORMATION: Right-sided abdominal pain. Shingles.    COMPARISON: Reference is made to MRI 2019 and CT scan 3/14/2018.    CONTRAST/COMPLICATIONS:  IV Contrast: NONE  0 cc administered   0 cc discarded  Oral Contrast: Omnipaque 300  Complications: None reported at time of study completion    PROCEDURE:  CT of the Abdomen and Pelvis was performed.  Sagittal and coronal reformats were performed.    FINDINGS:  LOWER CHEST: Cardiomegaly. Dependent atelectasis.    LIVER: Punctate calcification in the right hepatic lobe.  BILE DUCTS: Normal caliber.  GALLBLADDER: Within normal limits.  SPLEEN: Within normal limits.  PANCREAS: Within normal limits.  ADRENALS: Within normal limits.  KIDNEYS/URETERS: Bilateral renal lesions corresponding with hemorrhagic and nonhemorrhagic cysts present on prior MRI. No calculus or hydronephrosis.    BLADDER: Within normal limits.  REPRODUCTIVE ORGANS: Status post TURP.    BOWEL: No bowel obstruction. Soft tissue mass at the base of the appendix measuring 3.4 cm in diameter with dilatation of the appendix and periappendiceal fat stranding.(2:78 and 602:35). Diverticulosis, without acute diverticulitis.  PERITONEUM: No ascites.  VESSELS: Atherosclerotic calcifications.  RETROPERITONEUM/LYMPH NODES: No lymphadenopathy.  ABDOMINAL WALL: Within normal limits.  BONES: Degenerative changes.    IMPRESSION:  Acute nonperforated appendicitis with soft tissue mass at the base of the appendix measuring approximately 3.4 cm in diameter. Findings are concerning for malignancy.        --- End of Report ---            ELIU ALANIS MD; Attending Radiologist  This document has been electronically signed. 2021  4:25PM    < end of copied text >

## 2021-12-01 NOTE — CONSULT NOTE ADULT - ASSESSMENT
86 yo M PMHx Parkinson's disease, HTN, HLD presents to ED c/o right sided abd pain x 3-4 days. Pt states pain is constant aching 7/10 pain. pt also c/o chronic constipation, takes Miralax nightly, admits to smaller BMs in the last few days. last BM 2 days ago. Pt denies N/V/D, fever/chills, urinary symptoms,  SOB.   CT abdom c/w acute AP, also suspicious mass.  For colonoscopy.  On further questioning, he reports chest pain at mild level at R breast region for about 5 days, started about same time as when RLQ pain started.  I spoke with wife who confirms that he did report the pain to her at same time as the abdom pain.  She thought pain was referred pain from abdom.  She reports he has not seem short of breath, no edema and no palpitation.    -Chest pain  Has been present for about 5 days, started same time as the abdom discomfort, could be referred pain.    No chest wall tenderness, mild level and he reports it has been constantly present.    There is no EKG in chart, will order.  Check one set Trop.  Check echo.  If above negative then pt is optimized for colonoscopy.   Prior TTE 11/2019 with normal EF and mild AS.  Prior EKG with IRBBB.     Above d/w pt's wife.    Will follow

## 2021-12-02 ENCOUNTER — TRANSCRIPTION ENCOUNTER (OUTPATIENT)
Age: 86
End: 2021-12-02

## 2021-12-02 LAB
24R-OH-CALCIDIOL SERPL-MCNC: 23.8 NG/ML — LOW (ref 30–80)
ALBUMIN SERPL ELPH-MCNC: 2.5 G/DL — LOW (ref 3.3–5)
ALP SERPL-CCNC: 41 U/L — SIGNIFICANT CHANGE UP (ref 40–120)
ALT FLD-CCNC: 7 U/L — LOW (ref 12–78)
ANION GAP SERPL CALC-SCNC: 7 MMOL/L — SIGNIFICANT CHANGE UP (ref 5–17)
AST SERPL-CCNC: 11 U/L — LOW (ref 15–37)
BILIRUB SERPL-MCNC: 0.8 MG/DL — SIGNIFICANT CHANGE UP (ref 0.2–1.2)
BUN SERPL-MCNC: 24 MG/DL — HIGH (ref 7–23)
CALCIUM SERPL-MCNC: 8.3 MG/DL — LOW (ref 8.5–10.1)
CALCIUM SERPL-MCNC: 8.6 MG/DL — SIGNIFICANT CHANGE UP (ref 8.4–10.5)
CHLORIDE SERPL-SCNC: 110 MMOL/L — HIGH (ref 96–108)
CO2 SERPL-SCNC: 26 MMOL/L — SIGNIFICANT CHANGE UP (ref 22–31)
CREAT SERPL-MCNC: 1.7 MG/DL — HIGH (ref 0.5–1.3)
GLUCOSE SERPL-MCNC: 95 MG/DL — SIGNIFICANT CHANGE UP (ref 70–99)
HCT VFR BLD CALC: 36.2 % — LOW (ref 39–50)
HGB BLD-MCNC: 12.6 G/DL — LOW (ref 13–17)
MCHC RBC-ENTMCNC: 30.9 PG — SIGNIFICANT CHANGE UP (ref 27–34)
MCHC RBC-ENTMCNC: 34.8 GM/DL — SIGNIFICANT CHANGE UP (ref 32–36)
MCV RBC AUTO: 88.7 FL — SIGNIFICANT CHANGE UP (ref 80–100)
NRBC # BLD: 0 /100 WBCS — SIGNIFICANT CHANGE UP (ref 0–0)
PHOSPHATE SERPL-MCNC: 3.1 MG/DL — SIGNIFICANT CHANGE UP (ref 2.5–4.5)
PLATELET # BLD AUTO: 186 K/UL — SIGNIFICANT CHANGE UP (ref 150–400)
POTASSIUM SERPL-MCNC: 4.1 MMOL/L — SIGNIFICANT CHANGE UP (ref 3.5–5.3)
POTASSIUM SERPL-SCNC: 4.1 MMOL/L — SIGNIFICANT CHANGE UP (ref 3.5–5.3)
PROT SERPL-MCNC: 6.2 G/DL — SIGNIFICANT CHANGE UP (ref 6–8.3)
PTH-INTACT FLD-MCNC: 53 PG/ML — SIGNIFICANT CHANGE UP (ref 15–65)
RBC # BLD: 4.08 M/UL — LOW (ref 4.2–5.8)
RBC # FLD: 12.6 % — SIGNIFICANT CHANGE UP (ref 10.3–14.5)
SARS-COV-2 RNA SPEC QL NAA+PROBE: SIGNIFICANT CHANGE UP
SODIUM SERPL-SCNC: 143 MMOL/L — SIGNIFICANT CHANGE UP (ref 135–145)
VIT D25+D1,25 OH+D1,25 PNL SERPL-MCNC: 35 PG/ML — SIGNIFICANT CHANGE UP (ref 19.9–79.3)
WBC # BLD: 5.05 K/UL — SIGNIFICANT CHANGE UP (ref 3.8–10.5)
WBC # FLD AUTO: 5.05 K/UL — SIGNIFICANT CHANGE UP (ref 3.8–10.5)

## 2021-12-02 PROCEDURE — 99232 SBSQ HOSP IP/OBS MODERATE 35: CPT

## 2021-12-02 RX ORDER — AMLODIPINE BESYLATE 2.5 MG/1
2.5 TABLET ORAL DAILY
Refills: 0 | Status: DISCONTINUED | OUTPATIENT
Start: 2021-12-02 | End: 2021-12-07

## 2021-12-02 RX ORDER — SOD SULF/SODIUM/NAHCO3/KCL/PEG
1000 SOLUTION, RECONSTITUTED, ORAL ORAL EVERY 4 HOURS
Refills: 0 | Status: COMPLETED | OUTPATIENT
Start: 2021-12-02 | End: 2021-12-02

## 2021-12-02 RX ADMIN — GABAPENTIN 100 MILLIGRAM(S): 400 CAPSULE ORAL at 05:29

## 2021-12-02 RX ADMIN — PIPERACILLIN AND TAZOBACTAM 25 GRAM(S): 4; .5 INJECTION, POWDER, LYOPHILIZED, FOR SOLUTION INTRAVENOUS at 21:42

## 2021-12-02 RX ADMIN — CALCITRIOL 0.25 MICROGRAM(S): 0.5 CAPSULE ORAL at 11:27

## 2021-12-02 RX ADMIN — GABAPENTIN 100 MILLIGRAM(S): 400 CAPSULE ORAL at 21:42

## 2021-12-02 RX ADMIN — PRAMIPEXOLE DIHYDROCHLORIDE 1 MILLIGRAM(S): 0.12 TABLET ORAL at 11:27

## 2021-12-02 RX ADMIN — CARBIDOPA AND LEVODOPA 1.5 TABLET(S): 25; 100 TABLET ORAL at 21:41

## 2021-12-02 RX ADMIN — Medication 5 MILLIGRAM(S): at 05:29

## 2021-12-02 RX ADMIN — PANTOPRAZOLE SODIUM 40 MILLIGRAM(S): 20 TABLET, DELAYED RELEASE ORAL at 11:27

## 2021-12-02 RX ADMIN — GABAPENTIN 100 MILLIGRAM(S): 400 CAPSULE ORAL at 14:13

## 2021-12-02 RX ADMIN — AMLODIPINE BESYLATE 2.5 MILLIGRAM(S): 2.5 TABLET ORAL at 05:29

## 2021-12-02 RX ADMIN — CARBIDOPA AND LEVODOPA 1.5 TABLET(S): 25; 100 TABLET ORAL at 14:11

## 2021-12-02 RX ADMIN — Medication 10 MILLIGRAM(S): at 21:41

## 2021-12-02 RX ADMIN — ATORVASTATIN CALCIUM 10 MILLIGRAM(S): 80 TABLET, FILM COATED ORAL at 21:40

## 2021-12-02 RX ADMIN — PIPERACILLIN AND TAZOBACTAM 25 GRAM(S): 4; .5 INJECTION, POWDER, LYOPHILIZED, FOR SOLUTION INTRAVENOUS at 14:12

## 2021-12-02 RX ADMIN — SENNA PLUS 2 TABLET(S): 8.6 TABLET ORAL at 21:40

## 2021-12-02 RX ADMIN — Medication 10 MILLIGRAM(S): at 16:11

## 2021-12-02 RX ADMIN — Medication 5 MILLIGRAM(S): at 17:47

## 2021-12-02 RX ADMIN — Medication 1000 MILLILITER(S): at 21:42

## 2021-12-02 RX ADMIN — CARBIDOPA AND LEVODOPA 1.5 TABLET(S): 25; 100 TABLET ORAL at 05:30

## 2021-12-02 RX ADMIN — PIPERACILLIN AND TAZOBACTAM 25 GRAM(S): 4; .5 INJECTION, POWDER, LYOPHILIZED, FOR SOLUTION INTRAVENOUS at 05:30

## 2021-12-02 RX ADMIN — Medication 1000 MILLILITER(S): at 17:36

## 2021-12-02 NOTE — PROGRESS NOTE ADULT - SUBJECTIVE AND OBJECTIVE BOX
[facility]  Dakota Díaz PA-C  [facility] [place] [address]  [phone]      INTERVAL HPI/OVERNIGHT EVENTS:  Pt s/e  Pt states he feels well  Planned for colonoscopy tomorrow    MEDICATIONS  (STANDING):  amLODIPine   Tablet 2.5 milliGRAM(s) Oral daily  atorvastatin 10 milliGRAM(s) Oral at bedtime  calcitriol   Capsule 0.25 MICROGram(s) Oral daily  carbidopa/levodopa CR 25/100 1.5 Tablet(s) Oral <User Schedule>  dextrose 5% + sodium chloride 0.45%. 1000 milliLiter(s) (75 mL/Hr) IV Continuous <Continuous>  gabapentin 100 milliGRAM(s) Oral three times a day  oxybutynin 5 milliGRAM(s) Oral two times a day  pantoprazole  Injectable 40 milliGRAM(s) IV Push daily  piperacillin/tazobactam IVPB.. 3.375 Gram(s) IV Intermittent every 8 hours  polyethylene glycol 3350 17 Gram(s) Oral daily  pramipexole 1 milliGRAM(s) Oral daily  senna 2 Tablet(s) Oral at bedtime    MEDICATIONS  (PRN):  bisacodyl 5 milliGRAM(s) Oral every 12 hours PRN Constipation  glycerin Suppository - Adult 1 Suppository(s) Rectal daily PRN Constipation  morphine  - Injectable 2 milliGRAM(s) IV Push every 6 hours PRN Severe Pain (7 - 10)  ondansetron Injectable 4 milliGRAM(s) IV Push four times a day PRN Nausea and/or Vomiting      Allergies    No Known Allergies      PHYSICAL EXAM:   Vital Signs:  Vital Signs Last 24 Hrs  T(C): 36.8 (02 Dec 2021 12:03), Max: 36.9 (01 Dec 2021 21:30)  T(F): 98.2 (02 Dec 2021 12:03), Max: 98.4 (01 Dec 2021 21:30)  HR: 55 (02 Dec 2021 12:03) (55 - 59)  BP: 133/84 (02 Dec 2021 12:03) (115/72 - 133/84)  BP(mean): --  RR: 20 (02 Dec 2021 12:03) (17 - 20)  SpO2: 93% (02 Dec 2021 12:03) (93% - 95%)  Daily     Daily Weight in k.4 (02 Dec 2021 04:44)    GENERAL:  Appears stated age,   HEENT:  NC/AT,    CHEST:  Full & symmetric excursion,   HEART:  Regular rhythm,  ABDOMEN:  Soft, non-tender, non-distended,  EXTEREMITIES:  no cyanosis  SKIN:  No rash  NEURO:  Alert,       LABS:                        12.6   5.05  )-----------( 186      ( 02 Dec 2021 08:04 )             36.2     12-    143  |  110<H>  |  24<H>  ----------------------------<  95  4.1   |  26  |  1.70<H>    Ca    8.3<L>      02 Dec 2021 08:04  Phos  3.1     12-02    TPro  6.2  /  Alb  2.5<L>  /  TBili  0.8  /  DBili  x   /  AST  11<L>  /  ALT  7<L>  /  AlkPhos  41  12-02      Urinalysis Basic - ( 2021 19:26 )    Color: Pale Yellow / Appearance: Clear / S.010 / pH: x  Gluc: x / Ketone: Negative  / Bili: Negative / Urobili: Negative   Blood: x / Protein: Negative / Nitrite: Negative   Leuk Esterase: Negative / RBC: x / WBC x   Sq Epi: x / Non Sq Epi: x / Bacteria: x

## 2021-12-02 NOTE — PROGRESS NOTE ADULT - SUBJECTIVE AND OBJECTIVE BOX
SUBJECTIVE:  Patient seen and examined at bedside.  No overnight events.  Patient with no new complaints at this time, resting comfortably.    Vital Signs Last 24 Hrs  T(C): 36.6 (02 Dec 2021 04:44), Max: 36.9 (01 Dec 2021 21:30)  T(F): 97.8 (02 Dec 2021 04:44), Max: 98.4 (01 Dec 2021 21:30)  HR: 58 (02 Dec 2021 04:44) (58 - 64)  BP: 115/72 (02 Dec 2021 04:44) (115/72 - 131/79)  RR: 17 (02 Dec 2021 04:44) (17 - 18)  SpO2: 95% (02 Dec 2021 04:44) (93% - 95%)    PHYSICAL EXAM  GENERAL:  Well-nourished, well-developed male lying comfortably in bed in NAD  HEENT:  Sclera white. Mucous membranes moist.  CARDIO:  Regular rate and rhythm.  No murmur, gallop or rub appreciated.  RESPIRATORY:  Clear to auscultation bilaterally.  No wheezing, rales or rhonchi appreciated.  ABDOMEN:  Soft, nondistended, +Right-sided tenderness.  No rebound tenderness or guarding.  SKIN:  No jaundice, pallor, or cyanosis  NEURO:  A&O x 3    I&O's Summary    2021 07:  -  01 Dec 2021 07:00  --------------------------------------------------------  IN: 0 mL / OUT: 350 mL / NET: -350 mL    I&O's Detail    2021 07:01  -  01 Dec 2021 07:00  --------------------------------------------------------  IN:  Total IN: 0 mL    OUT:    Voided (mL): 350 mL  Total OUT: 350 mL    Total NET: -350 mL    MEDICATIONS  (STANDING):  amLODIPine   Tablet 2.5 milliGRAM(s) Oral daily  atorvastatin 10 milliGRAM(s) Oral at bedtime  calcitriol   Capsule 0.25 MICROGram(s) Oral daily  carbidopa/levodopa CR 25/100 1.5 Tablet(s) Oral <User Schedule>  dextrose 5% + sodium chloride 0.45%. 1000 milliLiter(s) (75 mL/Hr) IV Continuous <Continuous>  gabapentin 100 milliGRAM(s) Oral three times a day  oxybutynin 5 milliGRAM(s) Oral two times a day  pantoprazole  Injectable 40 milliGRAM(s) IV Push daily  piperacillin/tazobactam IVPB.. 3.375 Gram(s) IV Intermittent every 8 hours  polyethylene glycol 3350 17 Gram(s) Oral daily  pramipexole 1 milliGRAM(s) Oral daily  senna 2 Tablet(s) Oral at bedtime    MEDICATIONS  (PRN):  bisacodyl 5 milliGRAM(s) Oral every 12 hours PRN Constipation  glycerin Suppository - Adult 1 Suppository(s) Rectal daily PRN Constipation  morphine  - Injectable 2 milliGRAM(s) IV Push every 6 hours PRN Severe Pain (7 - 10)  ondansetron Injectable 4 milliGRAM(s) IV Push four times a day PRN Nausea and/or Vomiting    LABS:                        13.3   9.99  )-----------( 182      ( 2021 12:59 )             37.9     12-    143  |  112<H>  |  25<H>  ----------------------------<  98  4.3   |  26  |  1.70<H>    Ca    8.3<L>      01 Dec 2021 07:10    TPro  6.8  /  Alb  3.0<L>  /  TBili  0.5  /  DBili  x   /  AST  13<L>  /  ALT  10<L>  /  AlkPhos  55  1130    Urinalysis Basic - ( 2021 19:26 )  Color: Pale Yellow / Appearance: Clear / S.010 / pH: x  Gluc: x / Ketone: Negative  / Bili: Negative / Urobili: Negative   Blood: x / Protein: Negative / Nitrite: Negative   Leuk Esterase: Negative / RBC: x / WBC x   Sq Epi: x / Non Sq Epi: x / Bacteria: x

## 2021-12-02 NOTE — PROGRESS NOTE ADULT - SUBJECTIVE AND OBJECTIVE BOX
[facility]    CHIEF COMPLAINT: Patient is a 85y old  Male who presents with a chief complaint of Acute appendicitis (02 Dec 2021 15:50)      Follow Up: [ ] Chest Pain      [ ] Dyspnea     [ ] Palpitations    [ ] Atrial Fibrillation     [ ] Ventricular Dysrhythmia    [ ] Abnormal EKG                      [ ] Abnormal Cardiac Enzymes     [ ] Valvular Disease    HPI:  NAVA DIAZ is an 86 YO Male brought to the ED with complaining of right lower abdominal pain for 3-4 days.   Patient states that pain is constant aching 7/10 pain. Patient also has chronic constipation, takes Miralax nightly, admits to smaller BM in the last few days. Last BM 2 days ago. Patient denies N/V/D, fever/chills, urinary symptoms, chest pain, SOB.        (30 Nov 2021 19:27)    PAST MEDICAL & SURGICAL HISTORY:  Parkinson disease    Shingles    HTN (hypertension)    Hypercholesteremia    Mild asthma    No significant past surgical history      MEDICATIONS  (STANDING):  amLODIPine   Tablet 2.5 milliGRAM(s) Oral daily  atorvastatin 10 milliGRAM(s) Oral at bedtime  bisacodyl 10 milliGRAM(s) Oral every 4 hours  calcitriol   Capsule 0.25 MICROGram(s) Oral daily  carbidopa/levodopa CR 25/100 1.5 Tablet(s) Oral <User Schedule>  dextrose 5% + sodium chloride 0.45%. 1000 milliLiter(s) (75 mL/Hr) IV Continuous <Continuous>  gabapentin 100 milliGRAM(s) Oral three times a day  oxybutynin 5 milliGRAM(s) Oral two times a day  pantoprazole  Injectable 40 milliGRAM(s) IV Push daily  piperacillin/tazobactam IVPB.. 3.375 Gram(s) IV Intermittent every 8 hours  polyethylene glycol 3350 17 Gram(s) Oral daily  polyethylene glycol/electrolyte Solution 1000 milliLiter(s) Oral every 4 hours  pramipexole 1 milliGRAM(s) Oral daily  senna 2 Tablet(s) Oral at bedtime    MEDICATIONS  (PRN):  bisacodyl 5 milliGRAM(s) Oral every 12 hours PRN Constipation  glycerin Suppository - Adult 1 Suppository(s) Rectal daily PRN Constipation  morphine  - Injectable 2 milliGRAM(s) IV Push every 6 hours PRN Severe Pain (7 - 10)  ondansetron Injectable 4 milliGRAM(s) IV Push four times a day PRN Nausea and/or Vomiting    Allergies    No Known Allergies    Intolerances        REVIEW OF SYSTEMS:    CONSTITUTIONAL: No weakness, fevers or chills.   EYES/ENT: No visual changes;    NECK: No pain or stiffness  RESPIRATORY: No cough, wheezing, No shortness of breath  CARDIOVASCULAR: No chest pain or palpitations  GASTROINTESTINAL: abdominal pain, or hematochezia.  GENITOURINARY: No dysuria orhematuria  NEUROLOGICAL: No numbness or weakness  SKIN: No itching, burning, rashes  All other review of systems is negative unless indicated above    Vital Signs Last 24 Hrs  T(C): 36.8 (02 Dec 2021 12:03), Max: 36.9 (01 Dec 2021 21:30)  T(F): 98.2 (02 Dec 2021 12:03), Max: 98.4 (01 Dec 2021 21:30)  HR: 55 (02 Dec 2021 12:03) (55 - 59)  BP: 133/84 (02 Dec 2021 12:03) (115/72 - 133/84)  BP(mean): --  RR: 20 (02 Dec 2021 12:03) (17 - 20)  SpO2: 93% (02 Dec 2021 12:03) (93% - 95%)  I&O's Summary    01 Dec 2021 07:01  -  02 Dec 2021 07:00  --------------------------------------------------------  IN: 0 mL / OUT: 200 mL / NET: -200 mL        PHYSICAL EXAM:  Constitutional: NAD  Neurological: Alert, no focal deficits  HEENT: no JVD, EOMI  Cardiovascular: Regular, S1 and S2, no murmur  Pulmonary: breath sounds bilaterally  Gastrointestinal: Bowel Sounds present, soft,   abd pain  EXT:  no peripheral edema  Skin: No rashes.  Psych:  Mood calm  LABS: All Labs Reviewed:                          12.6 5.05  )-----------( 186      ( 02 Dec 2021 08:04 )             36.2     12-02    143  |  110<H>  |  24<H>  ----------------------------<  95  4.1   |  26  |  1.70<H>    Ca    8.3<L>      02 Dec 2021 08:04  Phos  3.1     12-02    TPro  6.2  /  Alb  2.5<L>  /  TBili  0.8  /  DBili  x   /  AST  11<L>  /  ALT  7<L>  /  AlkPhos  41  12-02    12 Lead ECG:   Ventricular Rate 63 BPM    Atrial Rate 63 BPM    P-R Interval 138 ms    QRS Duration 116 ms    Q-T Interval 428 ms    QTC Calculation(Bazett) 437 ms    P Axis 16 degrees    R Axis -47 degrees    T Axis -13 degrees    Diagnosis Line Normal sinus rhythm  Incomplete right bundle branch block  Left anterior fascicular block  T wave abnormality, consider anterior ischemia    Confirmed by YAHIR BLAND ([id number]) on 12/1/2021 2:41:56 PM (12-01-21 @ 11:06)    · Assessment	  84 yo M PMHx Parkinson's disease, HTN, HLD presents to ED c/o right sided abd pain x 3-4 days. Pt states pain is constant aching 7/10 pain. pt also c/o chronic constipation, takes Miralax nightly, admits to smaller BMs in the last few days. last BM 2 days ago. Pt denies N/V/D, fever/chills, urinary symptoms,  SOB.   CT abdom c/w acute AP, also suspicious mass.  For colonoscopy.  On further questioning, he reports chest pain at mild level at R breast region for about 5 days, started about same time as when RLQ pain started.  I spoke with wife who confirms that he did report the pain to her at same time as the abdom pain.  She thought pain was referred pain from abdom.  She reports he has not seem short of breath, no edema and no palpitation.    -pt denies Chest pain today but he does report abd pain  s/p cp earlier this week,, could be referred pain.    No chest wall tenderness, mild level and he reports it has been constantly present.      Check one set Trop-NEGATIVE  .Check echo-ordered.  If ECHO OK, then pt is optimized for colonoscopy.   Prior TTE 11/2019 with normal EF and mild AS.  Prior EKG with IRBBB.     Above d/w pt's wife and Dr Kelly   Will follow

## 2021-12-02 NOTE — PROGRESS NOTE ADULT - ASSESSMENT
Prerenal azotemia, Likely CKD 3  Hypertension  Appendicitis    Stable renal indices. IV hydration. IV abx. Will follow creatinine trend. Check Vitamin D, phos and iPTH. Pt on calcitriol as out pt.   Monitor BP trend. Titrate BP meds as needed. Will follow electrolytes and renal function trend.

## 2021-12-02 NOTE — PROGRESS NOTE ADULT - SUBJECTIVE AND OBJECTIVE BOX
Patient is a 85y old  Male who presents with a chief complaint of Acute appendicitis (02 Dec 2021 12:06)    Patient seen in follow up for       PAST MEDICAL HISTORY:  Parkinson disease    Shingles    HTN (hypertension)    Hypercholesteremia    Mild asthma      MEDICATIONS  (STANDING):  amLODIPine   Tablet 2.5 milliGRAM(s) Oral daily  atorvastatin 10 milliGRAM(s) Oral at bedtime  bisacodyl 10 milliGRAM(s) Oral every 4 hours  calcitriol   Capsule 0.25 MICROGram(s) Oral daily  carbidopa/levodopa CR 25/100 1.5 Tablet(s) Oral <User Schedule>  dextrose 5% + sodium chloride 0.45%. 1000 milliLiter(s) (75 mL/Hr) IV Continuous <Continuous>  gabapentin 100 milliGRAM(s) Oral three times a day  oxybutynin 5 milliGRAM(s) Oral two times a day  pantoprazole  Injectable 40 milliGRAM(s) IV Push daily  piperacillin/tazobactam IVPB.. 3.375 Gram(s) IV Intermittent every 8 hours  polyethylene glycol 3350 17 Gram(s) Oral daily  polyethylene glycol/electrolyte Solution 1000 milliLiter(s) Oral every 4 hours  pramipexole 1 milliGRAM(s) Oral daily  senna 2 Tablet(s) Oral at bedtime    MEDICATIONS  (PRN):  bisacodyl 5 milliGRAM(s) Oral every 12 hours PRN Constipation  glycerin Suppository - Adult 1 Suppository(s) Rectal daily PRN Constipation  morphine  - Injectable 2 milliGRAM(s) IV Push every 6 hours PRN Severe Pain (7 - 10)  ondansetron Injectable 4 milliGRAM(s) IV Push four times a day PRN Nausea and/or Vomiting    T(C): 36.8 (21 @ 12:03), Max: 37.5 (21 @ 04:54)  HR: 55 (21 @ 12:03) (55 - 64)  BP: 133/84 (21 @ 12:03) (115/72 - 133/84)  RR: 20 (21 @ 12:03) (17 - 20)  SpO2: 93% (21 @ 12:03) (93% - 95%)  Wt(kg): --  I&O's Detail    01 Dec 2021 07:01  -  02 Dec 2021 07:00  --------------------------------------------------------  IN:  Total IN: 0 mL    OUT:    Voided (mL): 200 mL  Total OUT: 200 mL    Total NET: -200 mL          PHYSICAL EXAM:  General: No distress  Respiratory: b/l air entry  Cardiovascular: S1 S2  Gastrointestinal: soft  Extremities:  edema                              12.6   5.05  )-----------( 186      ( 02 Dec 2021 08:04 )             36.2         143  |  110<H>  |  24<H>  ----------------------------<  95  4.1   |  26  |  1.70<H>    Ca    8.3<L>      02 Dec 2021 08:04  Phos  3.1         TPro  6.2  /  Alb  2.5<L>  /  TBili  0.8  /  DBili  x   /  AST  11<L>  /  ALT  7<L>  /  AlkPhos  41          LIVER FUNCTIONS - ( 02 Dec 2021 08:04 )  Alb: 2.5 g/dL / Pro: 6.2 g/dL / ALK PHOS: 41 U/L / ALT: 7 U/L / AST: 11 U/L / GGT: x           Urinalysis Basic - ( 2021 19:26 )    Color: Pale Yellow / Appearance: Clear / S.010 / pH: x  Gluc: x / Ketone: Negative  / Bili: Negative / Urobili: Negative   Blood: x / Protein: Negative / Nitrite: Negative   Leuk Esterase: Negative / RBC: x / WBC x   Sq Epi: x / Non Sq Epi: x / Bacteria: x        Sodium, Serum: 143 ( @ 08:04)  Sodium, Serum: 143 ( @ 07:10)  Sodium, Serum: 141 ( @ 12:59)    Creatinine, Serum: 1.70 ( @ 08:04)  Creatinine, Serum: 1.70 ( @ 07:10)  Creatinine, Serum: 1.80 ( @ 12:59)    Potassium, Serum: 4.1 ( @ 08:04)  Potassium, Serum: 4.3 ( @ 07:10)  Potassium, Serum: 4.3 ( @ 12:59)    Hemoglobin: 12.6 ( @ 08:04)  Hemoglobin: 13.3 ( @ 12:59)

## 2021-12-02 NOTE — PROGRESS NOTE ADULT - SUBJECTIVE AND OBJECTIVE BOX
Patient is a 85y old  Male who presents with a chief complaint of Acute appendicitis (02 Dec 2021 15:50)      INTERVAL /OVERNIGHT EVENTS: denies nausea, pain improved    MEDICATIONS  (STANDING):  amLODIPine   Tablet 2.5 milliGRAM(s) Oral daily  atorvastatin 10 milliGRAM(s) Oral at bedtime  bisacodyl 10 milliGRAM(s) Oral every 4 hours  calcitriol   Capsule 0.25 MICROGram(s) Oral daily  carbidopa/levodopa CR 25/100 1.5 Tablet(s) Oral <User Schedule>  dextrose 5% + sodium chloride 0.45%. 1000 milliLiter(s) (75 mL/Hr) IV Continuous <Continuous>  gabapentin 100 milliGRAM(s) Oral three times a day  oxybutynin 5 milliGRAM(s) Oral two times a day  pantoprazole  Injectable 40 milliGRAM(s) IV Push daily  piperacillin/tazobactam IVPB.. 3.375 Gram(s) IV Intermittent every 8 hours  polyethylene glycol 3350 17 Gram(s) Oral daily  polyethylene glycol/electrolyte Solution 1000 milliLiter(s) Oral every 4 hours  pramipexole 1 milliGRAM(s) Oral daily  senna 2 Tablet(s) Oral at bedtime    MEDICATIONS  (PRN):  bisacodyl 5 milliGRAM(s) Oral every 12 hours PRN Constipation  glycerin Suppository - Adult 1 Suppository(s) Rectal daily PRN Constipation  morphine  - Injectable 2 milliGRAM(s) IV Push every 6 hours PRN Severe Pain (7 - 10)  ondansetron Injectable 4 milliGRAM(s) IV Push four times a day PRN Nausea and/or Vomiting      Allergies    No Known Allergies    Intolerances        REVIEW OF SYSTEMS:  CONSTITUTIONAL: No fever, weight loss, or fatigue  EYES: No eye pain, visual disturbances, or discharge  ENMT:  No difficulty hearing, tinnitus, vertigo; No sinus or throat pain  NECK: No pain or stiffness  RESPIRATORY: No cough, wheezing, chills or hemoptysis; No shortness of breath  CARDIOVASCULAR: No chest pain, palpitations, dizziness, or leg swelling  GASTROINTESTINAL: No abdominal or epigastric pain. No nausea, vomiting, or hematemesis; No diarrhea or constipation. No melena or hematochezia.  GENITOURINARY: No dysuria, frequency, hematuria, or incontinence  NEUROLOGICAL: No headaches, memory loss, loss of strength, numbness, or tremors  SKIN: No itching, burning, rashes, or lesions   LYMPH NODES: No enlarged glands  ENDOCRINE: No heat or cold intolerance; No hair loss; No polydipsia or polyuria  MUSCULOSKELETAL: No joint pain or swelling; No muscle, back, or extremity pain  PSYCHIATRIC: No depression, anxiety, mood swings, or difficulty sleeping  HEME/LYMPH: No easy bruising, or bleeding gums  ALLERGY AND IMMUNOLOGIC: No hives or eczema    Vital Signs Last 24 Hrs  T(C): 36.8 (02 Dec 2021 12:03), Max: 36.9 (01 Dec 2021 21:30)  T(F): 98.2 (02 Dec 2021 12:03), Max: 98.4 (01 Dec 2021 21:30)  HR: 55 (02 Dec 2021 12:) (55 - 59)  BP: 133/84 (02 Dec 2021 12:03) (115/72 - 133/84)  BP(mean): --  RR: 20 (02 Dec 2021 12:) (17 - 20)  SpO2: 93% (02 Dec 2021 12:) (93% - 95%)    PHYSICAL EXAM:  GENERAL: NAD, well-groomed, well-developed  HEAD:  Atraumatic, Normocephalic  EYES: EOMI, PERRLA, conjunctiva and sclera clear  ENMT: No tonsillar erythema, exudates, or enlargement; Moist mucous membranes, Good dentition, No lesions  NECK: Supple, No JVD, Normal thyroid  NERVOUS SYSTEM:  Alert & Oriented X3, Good concentration; Motor Strength 5/5 B/L upper and lower extremities; DTRs 2+ intact and symmetric  CHEST/LUNG: Clear to auscultation bilaterally; No rales, rhonchi, wheezing, or rubs  HEART: Regular rate and rhythm; No murmurs, rubs, or gallops  ABDOMEN: Soft, Nontender, Nondistended; Bowel sounds present  EXTREMITIES:  2+ Peripheral Pulses, No clubbing, cyanosis, or edema  LYMPH: No lymphadenopathy noted  SKIN: No rashes or lesions    LABS:                        12.6   5.05  )-----------( 186      ( 02 Dec 2021 08:04 )             36.2     02 Dec 2021 08:04    143    |  110    |  24     ----------------------------<  95     4.1     |  26     |  1.70     Ca    8.3        02 Dec 2021 08:04  Phos  3.1       02 Dec 2021 08:04    TPro  6.2    /  Alb  2.5    /  TBili  0.8    /  DBili  x      /  AST  11     /  ALT  7      /  AlkPhos  41     02 Dec 2021 08:04      Urinalysis Basic - ( 2021 19:26 )    Color: Pale Yellow / Appearance: Clear / S.010 / pH: x  Gluc: x / Ketone: Negative  / Bili: Negative / Urobili: Negative   Blood: x / Protein: Negative / Nitrite: Negative   Leuk Esterase: Negative / RBC: x / WBC x   Sq Epi: x / Non Sq Epi: x / Bacteria: x      CAPILLARY BLOOD GLUCOSE          RADIOLOGY & ADDITIONAL TESTS:    Notes Reviewed:  [x ] YES  [ ] NO    Care Discussed with Consultants/Other Providers [x ] YES  [ ] NO

## 2021-12-02 NOTE — PROGRESS NOTE ADULT - ASSESSMENT
colonic mass vs appendicitis    F/u CEA level  surgery team recs noted, appreciated  oncology to see pt  cardiology consult noted, will require EKG, one trop check, and echo prior to colonoscopy  tentatively scheduled colonoscopy for Friday 12/3 given cardio clearance is complete  NPO  Bowel prep ordered  f/u cardiology note for clearance  will follow    Advanced care planning was discussed with patient and family.  Advanced care planning forms were reviewed and discussed.  Risks, benefits and alternatives of gastroenterologic procedures were discussed in detail and all questions were answered.    30 minutes spent.

## 2021-12-03 ENCOUNTER — RESULT REVIEW (OUTPATIENT)
Age: 86
End: 2021-12-03

## 2021-12-03 LAB
ANION GAP SERPL CALC-SCNC: 8 MMOL/L — SIGNIFICANT CHANGE UP (ref 5–17)
BUN SERPL-MCNC: 16 MG/DL — SIGNIFICANT CHANGE UP (ref 7–23)
CALCIUM SERPL-MCNC: 8.7 MG/DL — SIGNIFICANT CHANGE UP (ref 8.5–10.1)
CHLORIDE SERPL-SCNC: 116 MMOL/L — HIGH (ref 96–108)
CO2 SERPL-SCNC: 23 MMOL/L — SIGNIFICANT CHANGE UP (ref 22–31)
CREAT SERPL-MCNC: 1.7 MG/DL — HIGH (ref 0.5–1.3)
GLUCOSE SERPL-MCNC: 83 MG/DL — SIGNIFICANT CHANGE UP (ref 70–99)
HCT VFR BLD CALC: 38.8 % — LOW (ref 39–50)
HGB BLD-MCNC: 13.5 G/DL — SIGNIFICANT CHANGE UP (ref 13–17)
MCHC RBC-ENTMCNC: 31.2 PG — SIGNIFICANT CHANGE UP (ref 27–34)
MCHC RBC-ENTMCNC: 34.8 GM/DL — SIGNIFICANT CHANGE UP (ref 32–36)
MCV RBC AUTO: 89.6 FL — SIGNIFICANT CHANGE UP (ref 80–100)
NRBC # BLD: 0 /100 WBCS — SIGNIFICANT CHANGE UP (ref 0–0)
PLATELET # BLD AUTO: 210 K/UL — SIGNIFICANT CHANGE UP (ref 150–400)
POTASSIUM SERPL-MCNC: 4.3 MMOL/L — SIGNIFICANT CHANGE UP (ref 3.5–5.3)
POTASSIUM SERPL-SCNC: 4.3 MMOL/L — SIGNIFICANT CHANGE UP (ref 3.5–5.3)
RBC # BLD: 4.33 M/UL — SIGNIFICANT CHANGE UP (ref 4.2–5.8)
RBC # FLD: 12.5 % — SIGNIFICANT CHANGE UP (ref 10.3–14.5)
SODIUM SERPL-SCNC: 147 MMOL/L — HIGH (ref 135–145)
WBC # BLD: 6.38 K/UL — SIGNIFICANT CHANGE UP (ref 3.8–10.5)
WBC # FLD AUTO: 6.38 K/UL — SIGNIFICANT CHANGE UP (ref 3.8–10.5)

## 2021-12-03 PROCEDURE — 88305 TISSUE EXAM BY PATHOLOGIST: CPT | Mod: 26

## 2021-12-03 PROCEDURE — 99222 1ST HOSP IP/OBS MODERATE 55: CPT

## 2021-12-03 PROCEDURE — 99231 SBSQ HOSP IP/OBS SF/LOW 25: CPT

## 2021-12-03 RX ADMIN — PIPERACILLIN AND TAZOBACTAM 25 GRAM(S): 4; .5 INJECTION, POWDER, LYOPHILIZED, FOR SOLUTION INTRAVENOUS at 15:51

## 2021-12-03 RX ADMIN — CARBIDOPA AND LEVODOPA 1.5 TABLET(S): 25; 100 TABLET ORAL at 05:42

## 2021-12-03 RX ADMIN — Medication 5 MILLIGRAM(S): at 18:03

## 2021-12-03 RX ADMIN — Medication 5 MILLIGRAM(S): at 05:42

## 2021-12-03 RX ADMIN — AMLODIPINE BESYLATE 2.5 MILLIGRAM(S): 2.5 TABLET ORAL at 05:42

## 2021-12-03 RX ADMIN — SENNA PLUS 2 TABLET(S): 8.6 TABLET ORAL at 21:36

## 2021-12-03 RX ADMIN — ATORVASTATIN CALCIUM 10 MILLIGRAM(S): 80 TABLET, FILM COATED ORAL at 21:37

## 2021-12-03 RX ADMIN — GABAPENTIN 100 MILLIGRAM(S): 400 CAPSULE ORAL at 05:42

## 2021-12-03 RX ADMIN — CARBIDOPA AND LEVODOPA 1.5 TABLET(S): 25; 100 TABLET ORAL at 21:37

## 2021-12-03 RX ADMIN — GABAPENTIN 100 MILLIGRAM(S): 400 CAPSULE ORAL at 21:37

## 2021-12-03 RX ADMIN — PIPERACILLIN AND TAZOBACTAM 25 GRAM(S): 4; .5 INJECTION, POWDER, LYOPHILIZED, FOR SOLUTION INTRAVENOUS at 05:43

## 2021-12-03 RX ADMIN — PIPERACILLIN AND TAZOBACTAM 25 GRAM(S): 4; .5 INJECTION, POWDER, LYOPHILIZED, FOR SOLUTION INTRAVENOUS at 21:37

## 2021-12-03 NOTE — PROGRESS NOTE ADULT - SUBJECTIVE AND OBJECTIVE BOX
Patient is a 85y old  Male who presents with a chief complaint of Acute appendicitis (03 Dec 2021 16:46)      INTERVAL /OVERNIGHT EVENTS: for colonoscopy today, feels anxious    MEDICATIONS  (STANDING):  amLODIPine   Tablet 2.5 milliGRAM(s) Oral daily  atorvastatin 10 milliGRAM(s) Oral at bedtime  calcitriol   Capsule 0.25 MICROGram(s) Oral daily  carbidopa/levodopa CR 25/100 1.5 Tablet(s) Oral <User Schedule>  dextrose 5% + sodium chloride 0.45%. 1000 milliLiter(s) (75 mL/Hr) IV Continuous <Continuous>  gabapentin 100 milliGRAM(s) Oral three times a day  oxybutynin 5 milliGRAM(s) Oral two times a day  pantoprazole  Injectable 40 milliGRAM(s) IV Push daily  piperacillin/tazobactam IVPB.. 3.375 Gram(s) IV Intermittent every 8 hours  polyethylene glycol 3350 17 Gram(s) Oral daily  pramipexole 1 milliGRAM(s) Oral daily  senna 2 Tablet(s) Oral at bedtime    MEDICATIONS  (PRN):  bisacodyl 5 milliGRAM(s) Oral every 12 hours PRN Constipation  glycerin Suppository - Adult 1 Suppository(s) Rectal daily PRN Constipation  morphine  - Injectable 2 milliGRAM(s) IV Push every 6 hours PRN Severe Pain (7 - 10)  ondansetron Injectable 4 milliGRAM(s) IV Push four times a day PRN Nausea and/or Vomiting      Allergies    No Known Allergies    Intolerances        REVIEW OF SYSTEMS:  CONSTITUTIONAL: No fever, weight loss, or fatigue  EYES: No eye pain, visual disturbances, or discharge  ENMT:  No difficulty hearing, tinnitus, vertigo; No sinus or throat pain  NECK: No pain or stiffness  RESPIRATORY: No cough, wheezing, chills or hemoptysis; No shortness of breath  CARDIOVASCULAR: No chest pain, palpitations, dizziness, or leg swelling  GASTROINTESTINAL: No abdominal or epigastric pain. No nausea, vomiting, or hematemesis; No diarrhea or constipation. No melena or hematochezia.  GENITOURINARY: No dysuria, frequency, hematuria, or incontinence  NEUROLOGICAL: No headaches, memory loss, loss of strength, numbness, or tremors  SKIN: No itching, burning, rashes, or lesions   LYMPH NODES: No enlarged glands  ENDOCRINE: No heat or cold intolerance; No hair loss; No polydipsia or polyuria  MUSCULOSKELETAL: No joint pain or swelling; No muscle, back, or extremity pain  PSYCHIATRIC: No depression, anxiety, mood swings, or difficulty sleeping  HEME/LYMPH: No easy bruising, or bleeding gums  ALLERGY AND IMMUNOLOGIC: No hives or eczema    Vital Signs Last 24 Hrs  T(C): 36.4 (03 Dec 2021 11:54), Max: 36.7 (02 Dec 2021 19:52)  T(F): 97.6 (03 Dec 2021 11:54), Max: 98.1 (03 Dec 2021 09:57)  HR: 63 (03 Dec 2021 11:54) (60 - 73)  BP: 145/72 (03 Dec 2021 11:54) (134/77 - 145/72)  BP(mean): --  RR: 17 (03 Dec 2021 11:54) (16 - 18)  SpO2: 93% (03 Dec 2021 11:54) (91% - 93%)    PHYSICAL EXAM:  GENERAL: NAD, well-groomed, well-developed  HEAD:  Atraumatic, Normocephalic  EYES: EOMI, PERRLA, conjunctiva and sclera clear  ENMT: No tonsillar erythema, exudates, or enlargement; Moist mucous membranes, Good dentition, No lesions  NECK: Supple, No JVD, Normal thyroid  NERVOUS SYSTEM:  Alert & Oriented X3, Good concentration; Motor Strength 5/5 B/L upper and lower extremities; DTRs 2+ intact and symmetric  CHEST/LUNG: Clear to auscultation bilaterally; No rales, rhonchi, wheezing, or rubs  HEART: Regular rate and rhythm; No murmurs, rubs, or gallops  ABDOMEN: Soft, Nontender, Nondistended; Bowel sounds present  EXTREMITIES:  2+ Peripheral Pulses, No clubbing, cyanosis, or edema  LYMPH: No lymphadenopathy noted  SKIN: No rashes or lesions    LABS:                        13.5   6.38  )-----------( 210      ( 03 Dec 2021 07:23 )             38.8     03 Dec 2021 07:23    147    |  116    |  16     ----------------------------<  83     4.3     |  23     |  1.70     Ca    8.7        03 Dec 2021 07:23          CAPILLARY BLOOD GLUCOSE          RADIOLOGY & ADDITIONAL TESTS:    Notes Reviewed:  [x ] YES  [ ] NO    Care Discussed with Consultants/Other Providers [x ] YES  [ ] NO

## 2021-12-03 NOTE — PROGRESS NOTE ADULT - SUBJECTIVE AND OBJECTIVE BOX
[facility] Progress Note [phone] (Dr. Hernandez, Rboin, Michaelle, Ayan)    CHIEF COMPLAINT: Patient is a 85y old  Male who presents with a chief complaint of Acute appendicitis (02 Dec 2021 17:38)      Follow Up Today: The patient denies any chest discomfort or shortness of breath.    HPI:  NAVA DIAZ is an 86 YO Male brought to the ED with complaining of right lower abdominal pain for 3-4 days.   Patient states that pain is constant aching 7/10 pain. Patient also has chronic constipation, takes Miralax nightly, admits to smaller BM in the last few days. Last BM 2 days ago. Patient denies N/V/D, fever/chills, urinary symptoms, chest pain, SOB.        (30 Nov 2021 19:27)      PAST MEDICAL & SURGICAL HISTORY:  Parkinson disease    Shingles    HTN (hypertension)    Hypercholesteremia    Mild asthma    No significant past surgical history        MEDICATIONS  (STANDING):  amLODIPine   Tablet 2.5 milliGRAM(s) Oral daily  atorvastatin 10 milliGRAM(s) Oral at bedtime  calcitriol   Capsule 0.25 MICROGram(s) Oral daily  carbidopa/levodopa CR 25/100 1.5 Tablet(s) Oral <User Schedule>  dextrose 5% + sodium chloride 0.45%. 1000 milliLiter(s) (75 mL/Hr) IV Continuous <Continuous>  gabapentin 100 milliGRAM(s) Oral three times a day  oxybutynin 5 milliGRAM(s) Oral two times a day  pantoprazole  Injectable 40 milliGRAM(s) IV Push daily  piperacillin/tazobactam IVPB.. 3.375 Gram(s) IV Intermittent every 8 hours  polyethylene glycol 3350 17 Gram(s) Oral daily  pramipexole 1 milliGRAM(s) Oral daily  senna 2 Tablet(s) Oral at bedtime    MEDICATIONS  (PRN):  bisacodyl 5 milliGRAM(s) Oral every 12 hours PRN Constipation  glycerin Suppository - Adult 1 Suppository(s) Rectal daily PRN Constipation  morphine  - Injectable 2 milliGRAM(s) IV Push every 6 hours PRN Severe Pain (7 - 10)  ondansetron Injectable 4 milliGRAM(s) IV Push four times a day PRN Nausea and/or Vomiting      Allergies    No Known Allergies    Intolerances        REVIEW OF SYSTEMS:    All other review of systems is negative unless indicated above    Vital Signs Last 24 Hrs  T(C): 36.3 (03 Dec 2021 05:12), Max: 36.8 (02 Dec 2021 12:03)  T(F): 97.3 (03 Dec 2021 05:12), Max: 98.2 (02 Dec 2021 12:03)  HR: 69 (03 Dec 2021 05:12) (55 - 69)  BP: 134/77 (03 Dec 2021 05:12) (133/84 - 138/88)  BP(mean): --  RR: 18 (03 Dec 2021 05:12) (18 - 20)  SpO2: 93% (03 Dec 2021 05:12) (91% - 93%)    I&O's Summary      PHYSICAL EXAM:    Constitutional: NAD, awake and alert, well-developed  Eyes:  EOMI,  Pupils round, No oral cyanosis.  HEENT: No exudate or erythema  Pulmonary: Non-labored, breath sounds are clear bilaterally, No wheezing, rales or rhonchi  Cardiovascular: Regular, S1 and S2, No murmurs, rubs, gallops oir clicks  Gastrointestinal: Bowel Sounds present, soft, nontender.   Ext: No significant LE edema with good pulses x 4  Neurological: Alert, no gross focal motor deficits  Skin: No rashes.  Psych:  Mood & affect appropriate    LABS: All Labs Reviewed:                        13.5   6.38  )-----------( 210      ( 03 Dec 2021 07:23 )             38.8                         12.6   5.05  )-----------( 186      ( 02 Dec 2021 08:04 )             36.2                         13.3   9.99  )-----------( 182      ( 30 Nov 2021 12:59 )             37.9     03 Dec 2021 07:23    147    |  116    |  16     ----------------------------<  83     4.3     |  23     |  1.70   02 Dec 2021 08:04    143    |  110    |  24     ----------------------------<  95     4.1     |  26     |  1.70   01 Dec 2021 07:10    143    |  112    |  25     ----------------------------<  98     4.3     |  26     |  1.70     Ca    8.7        03 Dec 2021 07:23  Ca    8.3        02 Dec 2021 08:04  Ca    8.3        01 Dec 2021 07:10  Phos  3.1       02 Dec 2021 08:04    TPro  6.2    /  Alb  2.5    /  TBili  0.8    /  DBili  x      /  AST  11     /  ALT  7      /  AlkPhos  41     02 Dec 2021 08:04  TPro  6.8    /  Alb  3.0    /  TBili  0.5    /  DBili  x      /  AST  13     /  ALT  10     /  AlkPhos  55     30 Nov 2021 12:59          Blood Culture:         RADIOLOGY/EKG:    Attending Attestation:   20 minutes spent on total encounter; more than 50% of the visit was spent counseling and/or coordinating care by the attending physician.     ASSESSMENT AND PLAN

## 2021-12-03 NOTE — PROGRESS NOTE ADULT - SUBJECTIVE AND OBJECTIVE BOX
Hospital day 4    85y Male admitted with Acute appendicitis      Patient seen and examined bedside resting comfortably, post colonoscopy.  No complaints at this time. No overnight events.  Awaiting GI report on colonoscopy.      T(F): 97.6 (12-03-21 @ 11:54), Max: 98.1 (12-03-21 @ 09:57)  HR: 63 (12-03-21 @ 11:54) (60 - 73)  BP: 145/72 (12-03-21 @ 11:54) (134/77 - 145/72)  RR: 17 (12-03-21 @ 11:54) (16 - 18)  SpO2: 93% (12-03-21 @ 11:54) (91% - 93%)  Wt(kg): --  CAPILLARY BLOOD GLUCOSE          PHYSICAL EXAM:  General: NAD  Neuro:  Alert, still groggy from anesthesia  CV: +S1+S2 regular rate and rhythm  Lung: clear to ausculation bilaterally  Abdomen: Soft, ND, mild RLQ discomfort  Extremities: no pedal edema or calf tenderness noted       LABS:                        13.5   6.38  )-----------( 210      ( 03 Dec 2021 07:23 )             38.8     12-03    147<H>  |  116<H>  |  16  ----------------------------<  83  4.3   |  23  |  1.70<H>    Ca    8.7      03 Dec 2021 07:23  Phos  3.1     12-02    TPro  6.2  /  Alb  2.5<L>  /  TBili  0.8  /  DBili  x   /  AST  11<L>  /  ALT  7<L>  /  AlkPhos  41  12-02      I&O's Detail        RADIOLOGY:

## 2021-12-03 NOTE — DIETITIAN INITIAL EVALUATION ADULT. - OTHER INFO
Pt is a 86 yo male with PMH of asthma, constipation, high cholesterol, Htn, PD presents with 3-4 days of abdominal pain admitted for acute appendicitis vs colonic mass.  Pt having colonscopy done at this time.  +BM's overnight s/p prep.

## 2021-12-03 NOTE — PROGRESS NOTE ADULT - SUBJECTIVE AND OBJECTIVE BOX
Patient is a 85y old  Male who presents with a chief complaint of Acute appendicitis (02 Dec 2021 12:06)    Patient seen in follow up for       PAST MEDICAL HISTORY:  Parkinson disease    Shingles    HTN (hypertension)    Hypercholesteremia    Mild asthma      MEDICATIONS  (STANDING):  amLODIPine   Tablet 2.5 milliGRAM(s) Oral daily  atorvastatin 10 milliGRAM(s) Oral at bedtime  calcitriol   Capsule 0.25 MICROGram(s) Oral daily  carbidopa/levodopa CR 25/100 1.5 Tablet(s) Oral <User Schedule>  dextrose 5% + sodium chloride 0.45%. 1000 milliLiter(s) (75 mL/Hr) IV Continuous <Continuous>  gabapentin 100 milliGRAM(s) Oral three times a day  oxybutynin 5 milliGRAM(s) Oral two times a day  pantoprazole  Injectable 40 milliGRAM(s) IV Push daily  piperacillin/tazobactam IVPB.. 3.375 Gram(s) IV Intermittent every 8 hours  polyethylene glycol 3350 17 Gram(s) Oral daily  pramipexole 1 milliGRAM(s) Oral daily  senna 2 Tablet(s) Oral at bedtime    MEDICATIONS  (PRN):  bisacodyl 5 milliGRAM(s) Oral every 12 hours PRN Constipation  glycerin Suppository - Adult 1 Suppository(s) Rectal daily PRN Constipation  morphine  - Injectable 2 milliGRAM(s) IV Push every 6 hours PRN Severe Pain (7 - 10)  ondansetron Injectable 4 milliGRAM(s) IV Push four times a day PRN Nausea and/or Vomiting    T(C): 36.7 (12-03-21 @ 09:57), Max: 36.9 (12-01-21 @ 21:30)  HR: 73 (12-03-21 @ 09:57) (55 - 73)  BP: 144/89 (12-03-21 @ 09:57) (115/72 - 144/89)  RR: 16 (12-03-21 @ 09:57)  SpO2: 93% (12-03-21 @ 09:57)  Wt(kg): --  I&O's Detail          PHYSICAL EXAM:  General: No distress  Respiratory: b/l air entry  Cardiovascular: S1 S2  Gastrointestinal: soft  Extremities:  edema                             LABORATORY:                        13.5   6.38  )-----------( 210      ( 03 Dec 2021 07:23 )             38.8     12-03    147<H>  |  116<H>  |  16  ----------------------------<  83  4.3   |  23  |  1.70<H>    Ca    8.7      03 Dec 2021 07:23  Phos  3.1     12-02    TPro  6.2  /  Alb  2.5<L>  /  TBili  0.8  /  DBili  x   /  AST  11<L>  /  ALT  7<L>  /  AlkPhos  41  12-02    Sodium, Serum: 147 mmol/L (12-03 @ 07:23)  Sodium, Serum: 143 mmol/L (12-02 @ 08:04)    Potassium, Serum: 4.3 mmol/L (12-03 @ 07:23)  Potassium, Serum: 4.1 mmol/L (12-02 @ 08:04)    Hemoglobin: 13.5 g/dL (12-03 @ 07:23)  Hemoglobin: 12.6 g/dL (12-02 @ 08:04)  Hemoglobin: 13.3 g/dL (11-30 @ 12:59)    Creatinine, Serum 1.70 (12-03 @ 07:23)  Creatinine, Serum 1.70 (12-02 @ 08:04)  Creatinine, Serum 1.70 (12-01 @ 07:10)  Creatinine, Serum 1.80 (11-30 @ 12:59)        LIVER FUNCTIONS - ( 02 Dec 2021 08:04 )  Alb: 2.5 g/dL / Pro: 6.2 g/dL / ALK PHOS: 41 U/L / ALT: 7 U/L / AST: 11 U/L / GGT: x

## 2021-12-03 NOTE — PROGRESS NOTE ADULT - SUBJECTIVE AND OBJECTIVE BOX
s/p colonoscopy    "irritation at appendiceal orifice" biopsied  scattered polyps, resected  diverticulosis  hemmoroids    rec:   cont present care  surgery f/u re: appendectomy  diet as mauri

## 2021-12-03 NOTE — CONSULT NOTE ADULT - SUBJECTIVE AND OBJECTIVE BOX
[facility]  INFECTIOUS DISEASES   [address] [place] [address]  Tel: [phone]     Fax: [phone]  ======================================================  MD Bubba Gallegos Kaushal, MD Cho, Michelle, MD   ======================================================    MRN-[id number]  NAVA PETTYBUSHRAILIANA     CC: Acute appendicitis     HPI:  86 yo man with PMH of HTN, asthma and parkinson's disease was admitted with right lower abdominal pain for 3-4 days. Pain started on epigastric and periumbilical area and then moved to lower abdomen mostly in RLQ. No fever.   CT was done showing possible acute appendicitis but also malignancy was a concern.  Currently no complaint or pain.   He has been started on zosyn and ID was called for further recommendations.     PAST MEDICAL & SURGICAL HISTORY:  Parkinson disease  Shingles  HTN (hypertension)  Hypercholesteremia  Mild asthma  No significant past surgical history    Social Hx: no smoking, ETOH or drugs     FAMILY HISTORY:  No pertinent family history in first degree relatives    Allergies  No Known Allergies    Antibiotics:  piperacillin/tazobactam IVPB.. 3.375 Gram(s) IV Intermittent every 8 hours    REVIEW OF SYSTEMS:  CONSTITUTIONAL:  No Fever or chills  HEENT:  No diplopia or blurred vision.  No sore throat or runny nose.  CARDIOVASCULAR:  No chest pain or SOB.  RESPIRATORY:  No cough, shortness of breath, PND or orthopnea.  GASTROINTESTINAL:  No nausea, vomiting or diarrhea.  GENITOURINARY:  No dysuria, frequency or urgency. No Blood in urine  MUSCULOSKELETAL:  no joint aches, no muscle pain  SKIN:  No change in skin, hair or nails.  NEUROLOGIC:  No paresthesias, fasciculations, seizures or weakness.  PSYCHIATRIC:  No disorder of thought or mood.  ENDOCRINE:  No heat or cold intolerance, polyuria or polydipsia.  HEMATOLOGICAL:  No easy bruising or bleeding.     Physical Exam:  Vital Signs Last 24 Hrs  T(C): 36.4 (03 Dec 2021 11:54), Max: 36.7 (02 Dec 2021 19:52)  T(F): 97.6 (03 Dec 2021 11:54), Max: 98.1 (03 Dec 2021 09:57)  HR: 63 (03 Dec 2021 11:54) (60 - 73)  BP: 145/72 (03 Dec 2021 11:54) (134/77 - 145/72)  RR: 17 (03 Dec 2021 11:54) (16 - 18)  SpO2: 93% (03 Dec 2021 11:54) (91% - 93%)  GEN: NAD  HEENT: normocephalic and atraumatic. EOMI. PERRL.    NECK: Supple.  No lymphadenopathy   LUNGS: Clear to auscultation.  HEART: Regular rate and rhythm without murmur.  ABDOMEN: Soft, nontender, and nondistended.  Positive bowel sounds.    : No CVA tenderness  EXTREMITIES: Without any cyanosis, clubbing, rash, lesions or edema.  NEUROLOGIC: grossly intact.  PSYCHIATRIC: Appropriate affect .  SKIN: No ulceration or induration present.    Labs:  12-03    147<H>  |  116<H>  |  16  ----------------------------<  83  4.3   |  23  |  1.70<H>    Ca    8.7      03 Dec 2021 07:23  Phos  3.1     12-02    TPro  6.2  /  Alb  2.5<L>  /  TBili  0.8  /  DBili  x   /  AST  11<L>  /  ALT  7<L>  /  AlkPhos  41  12-02                        13.5   6.38  )-----------( 210      ( 03 Dec 2021 07:23 )             38.8     LIVER FUNCTIONS - ( 02 Dec 2021 08:04 )  Alb: 2.5 g/dL / Pro: 6.2 g/dL / ALK PHOS: 41 U/L / ALT: 7 U/L / AST: 11 U/L / GGT: x           COVID-19 PCR: NotDetec (12-02-21 @ 07:44)  COVID-19 PCR: NotDetec (11-30-21 @ 13:09)    All imaging and other data have been reviewed.  < from: CT Abdomen and Pelvis w/ Oral Cont (11.30.21 @ 15:59) >  IMPRESSION:  Acute nonperforated appendicitis with soft tissue mass at the base of the appendix measuring approximately 3.4 cm in diameter. Findings are concerning for malignancy.    Assessment and Plan:   86 yo man with PMH of HTN, asthma and parkinson's disease was admitted with right lower abdominal pain for 3-4 days.   CT was done showing possible acute appendicitis with no perforation but also malignancy was a concern.     S/P Colonoscopy on 12/03.     Acute appendicitis:  - Blood culture   - Surgery follow up  - GI follow up, will follow colonoscopy results and pathology   - Agree with zosyn 3.375gm q8h to cover GI alysia    Thank you for courtesy of this consult.     Will follow.  Discussed with the primary team.     Suzanne Miller MD  Division of Infectious Diseases   Cell [phone] between 8am and 6pm   After 6pm and weekends please call ID service at [phone].     40 minutes spent on total encounter assessing patient, examination, chart reivew, counseling and coordinating care by the attending physician/nurse/care manager.

## 2021-12-03 NOTE — PROGRESS NOTE ADULT - ASSESSMENT
Prerenal azotemia, Likely CKD 3  Hypertension  Appendicitis    Stable renal indices. To continue current meds. Change IVF to D5W. IV abx. Monitor BP trend. Titrate BP meds as needed.   Surgery follow up. Will follow electrolytes and renal function trend.

## 2021-12-03 NOTE — PROGRESS NOTE ADULT - ASSESSMENT
86 yo M PMHx Parkinson's disease, HTN, HLD presents to ED c/o right sided abd pain x 3-4 days. Pt states pain is constant aching 7/10 pain. pt also c/o chronic constipation, takes Miralax nightly, admits to smaller BMs in the last few days. last BM 2 days ago. Pt denies N/V/D, fever/chills, urinary symptoms,  SOB.   CT abdom c/w acute AP, also suspicious mass.  For colonoscopy.  On further questioning, he reports chest pain at mild level at R breast region for about 5 days, started about same time as when RLQ pain started.  I spoke with wife who confirms that he did report the pain to her at same time as the abdom pain.  She thought pain was referred pain from abdom.  She reports he has not seem short of breath, no edema and no palpitation.    -pt denies Chest pain today but he does report abd pain  s/p cp earlier this week,, could be referred pain.    No chest wall tenderness, mild level and he reports it has been constantly present.      Check one set Trop-NEGATIVE  .Check echo-ordered.  If ECHO OK, then pt is optimized for colonoscopy.   Prior TTE 11/2019 with normal EF and mild AS.  Prior EKG with IRBBB.     Above d/w pt's wife and Dr Kelly   Will follow 84 yo M PMHx Parkinson's disease, HTN, HLD presents to ED c/o right sided abd pain x 3-4 days. Pt states pain is constant aching 7/10 pain. pt also c/o chronic constipation, takes Miralax nightly, admits to smaller BMs in the last few days. last BM 2 days ago. Pt denies N/V/D, fever/chills, urinary symptoms,  SOB.   CT abdom c/w acute AP, also suspicious mass.  For colonoscopy.  On further questioning, he reports chest pain at mild level at R breast region for about 5 days, started about same time as when RLQ pain started.  I spoke with wife who confirms that he did report the pain to her at same time as the abdom pain.  She thought pain was referred pain from abdom.  She reports he has not seem short of breath, no edema and no palpitation.      s/p cp earlier this week,, could be referred pain.    No chest wall tenderness, mild level and he reports it has been constantly present.      Check one set Trop-NEGATIVE      Prior TTE 11/2019 with normal EF and mild AS.  Prior EKG with IRBBB.   Today's Echo shows normal LVEF with mild AS    Cardiac optimized for colonoscopy    Above d/w pt's wife and Dr Kelly   Will follow

## 2021-12-04 LAB
ALBUMIN SERPL ELPH-MCNC: 2.5 G/DL — LOW (ref 3.3–5)
ALP SERPL-CCNC: 43 U/L — SIGNIFICANT CHANGE UP (ref 40–120)
ALT FLD-CCNC: 8 U/L — LOW (ref 12–78)
ANION GAP SERPL CALC-SCNC: 6 MMOL/L — SIGNIFICANT CHANGE UP (ref 5–17)
AST SERPL-CCNC: 21 U/L — SIGNIFICANT CHANGE UP (ref 15–37)
BILIRUB SERPL-MCNC: 0.6 MG/DL — SIGNIFICANT CHANGE UP (ref 0.2–1.2)
BUN SERPL-MCNC: 16 MG/DL — SIGNIFICANT CHANGE UP (ref 7–23)
CALCIUM SERPL-MCNC: 8.3 MG/DL — LOW (ref 8.5–10.1)
CHLORIDE SERPL-SCNC: 115 MMOL/L — HIGH (ref 96–108)
CO2 SERPL-SCNC: 22 MMOL/L — SIGNIFICANT CHANGE UP (ref 22–31)
CREAT SERPL-MCNC: 1.6 MG/DL — HIGH (ref 0.5–1.3)
GLUCOSE SERPL-MCNC: 100 MG/DL — HIGH (ref 70–99)
HCT VFR BLD CALC: 36.8 % — LOW (ref 39–50)
HGB BLD-MCNC: 12.5 G/DL — LOW (ref 13–17)
MCHC RBC-ENTMCNC: 30.6 PG — SIGNIFICANT CHANGE UP (ref 27–34)
MCHC RBC-ENTMCNC: 34 GM/DL — SIGNIFICANT CHANGE UP (ref 32–36)
MCV RBC AUTO: 90 FL — SIGNIFICANT CHANGE UP (ref 80–100)
NRBC # BLD: 0 /100 WBCS — SIGNIFICANT CHANGE UP (ref 0–0)
PLATELET # BLD AUTO: 198 K/UL — SIGNIFICANT CHANGE UP (ref 150–400)
POTASSIUM SERPL-MCNC: 3.9 MMOL/L — SIGNIFICANT CHANGE UP (ref 3.5–5.3)
POTASSIUM SERPL-SCNC: 3.9 MMOL/L — SIGNIFICANT CHANGE UP (ref 3.5–5.3)
PROT SERPL-MCNC: 6.3 G/DL — SIGNIFICANT CHANGE UP (ref 6–8.3)
RBC # BLD: 4.09 M/UL — LOW (ref 4.2–5.8)
RBC # FLD: 12.4 % — SIGNIFICANT CHANGE UP (ref 10.3–14.5)
SODIUM SERPL-SCNC: 143 MMOL/L — SIGNIFICANT CHANGE UP (ref 135–145)
WBC # BLD: 5.59 K/UL — SIGNIFICANT CHANGE UP (ref 3.8–10.5)
WBC # FLD AUTO: 5.59 K/UL — SIGNIFICANT CHANGE UP (ref 3.8–10.5)

## 2021-12-04 PROCEDURE — 99232 SBSQ HOSP IP/OBS MODERATE 35: CPT

## 2021-12-04 RX ADMIN — CARBIDOPA AND LEVODOPA 1.5 TABLET(S): 25; 100 TABLET ORAL at 22:03

## 2021-12-04 RX ADMIN — PIPERACILLIN AND TAZOBACTAM 25 GRAM(S): 4; .5 INJECTION, POWDER, LYOPHILIZED, FOR SOLUTION INTRAVENOUS at 22:04

## 2021-12-04 RX ADMIN — PIPERACILLIN AND TAZOBACTAM 25 GRAM(S): 4; .5 INJECTION, POWDER, LYOPHILIZED, FOR SOLUTION INTRAVENOUS at 06:12

## 2021-12-04 RX ADMIN — SENNA PLUS 2 TABLET(S): 8.6 TABLET ORAL at 22:03

## 2021-12-04 RX ADMIN — GABAPENTIN 100 MILLIGRAM(S): 400 CAPSULE ORAL at 22:03

## 2021-12-04 RX ADMIN — CALCITRIOL 0.25 MICROGRAM(S): 0.5 CAPSULE ORAL at 15:12

## 2021-12-04 RX ADMIN — CARBIDOPA AND LEVODOPA 1.5 TABLET(S): 25; 100 TABLET ORAL at 15:12

## 2021-12-04 RX ADMIN — POLYETHYLENE GLYCOL 3350 17 GRAM(S): 17 POWDER, FOR SOLUTION ORAL at 15:13

## 2021-12-04 RX ADMIN — GABAPENTIN 100 MILLIGRAM(S): 400 CAPSULE ORAL at 15:12

## 2021-12-04 RX ADMIN — AMLODIPINE BESYLATE 2.5 MILLIGRAM(S): 2.5 TABLET ORAL at 06:12

## 2021-12-04 RX ADMIN — Medication 5 MILLIGRAM(S): at 06:14

## 2021-12-04 RX ADMIN — Medication 5 MILLIGRAM(S): at 19:52

## 2021-12-04 RX ADMIN — PRAMIPEXOLE DIHYDROCHLORIDE 1 MILLIGRAM(S): 0.12 TABLET ORAL at 15:12

## 2021-12-04 RX ADMIN — GABAPENTIN 100 MILLIGRAM(S): 400 CAPSULE ORAL at 06:14

## 2021-12-04 RX ADMIN — ATORVASTATIN CALCIUM 10 MILLIGRAM(S): 80 TABLET, FILM COATED ORAL at 22:03

## 2021-12-04 RX ADMIN — CARBIDOPA AND LEVODOPA 1.5 TABLET(S): 25; 100 TABLET ORAL at 06:13

## 2021-12-04 RX ADMIN — PIPERACILLIN AND TAZOBACTAM 25 GRAM(S): 4; .5 INJECTION, POWDER, LYOPHILIZED, FOR SOLUTION INTRAVENOUS at 15:12

## 2021-12-04 NOTE — PROGRESS NOTE ADULT - SUBJECTIVE AND OBJECTIVE BOX
[facility] Progress Note [phone] (Dr. Hernandez, Robin, Michaelle, Ayan)    CHIEF COMPLAINT: Patient is a 85y old  Male who presents with a chief complaint of Acute appendicitis (03 Dec 2021 16:46)      Follow Up Today: The patient denies any chest discomfort or shortness of breath.    HPI:  NAVA DIAZ is an 84 YO Male brought to the ED with complaining of right lower abdominal pain for 3-4 days.   Patient states that pain is constant aching 7/10 pain. Patient also has chronic constipation, takes Miralax nightly, admits to smaller BM in the last few days. Last BM 2 days ago. Patient denies N/V/D, fever/chills, urinary symptoms, chest pain, SOB.        (30 Nov 2021 19:27)      PAST MEDICAL & SURGICAL HISTORY:  Parkinson disease    Shingles    HTN (hypertension)    Hypercholesteremia    Mild asthma    No significant past surgical history        MEDICATIONS  (STANDING):  amLODIPine   Tablet 2.5 milliGRAM(s) Oral daily  atorvastatin 10 milliGRAM(s) Oral at bedtime  calcitriol   Capsule 0.25 MICROGram(s) Oral daily  carbidopa/levodopa CR 25/100 1.5 Tablet(s) Oral <User Schedule>  gabapentin 100 milliGRAM(s) Oral three times a day  oxybutynin 5 milliGRAM(s) Oral two times a day  pantoprazole  Injectable 40 milliGRAM(s) IV Push daily  piperacillin/tazobactam IVPB.. 3.375 Gram(s) IV Intermittent every 8 hours  polyethylene glycol 3350 17 Gram(s) Oral daily  pramipexole 1 milliGRAM(s) Oral daily  senna 2 Tablet(s) Oral at bedtime    MEDICATIONS  (PRN):  bisacodyl 5 milliGRAM(s) Oral every 12 hours PRN Constipation  glycerin Suppository - Adult 1 Suppository(s) Rectal daily PRN Constipation  morphine  - Injectable 2 milliGRAM(s) IV Push every 6 hours PRN Severe Pain (7 - 10)  ondansetron Injectable 4 milliGRAM(s) IV Push four times a day PRN Nausea and/or Vomiting      Allergies    No Known Allergies    Intolerances        REVIEW OF SYSTEMS:    All other review of systems is negative unless indicated above    Vital Signs Last 24 Hrs  T(C): 36.5 (04 Dec 2021 04:57), Max: 37 (03 Dec 2021 20:05)  T(F): 97.7 (04 Dec 2021 04:57), Max: 98.6 (03 Dec 2021 20:05)  HR: 59 (04 Dec 2021 04:57) (59 - 73)  BP: 111/68 (04 Dec 2021 04:57) (111/68 - 145/72)  BP(mean): --  RR: 18 (04 Dec 2021 04:57) (16 - 18)  SpO2: 94% (04 Dec 2021 04:57) (93% - 94%)    I&O's Summary      PHYSICAL EXAM:    Constitutional: NAD, awake and alert, well-developed  Eyes:  EOMI,  Pupils round, No oral cyanosis.  HEENT: No exudate or erythema  Pulmonary: Non-labored, breath sounds are clear bilaterally, No wheezing, rales or rhonchi  Cardiovascular: Regular, S1 and S2, No murmurs, rubs, gallops oir clicks  Gastrointestinal: Bowel Sounds present, soft, nontender.   Ext: No significant LE edema with good pulses x 4  Neurological: Alert, no gross focal motor deficits  Skin: No rashes.  Psych:  Mood & affect appropriate    LABS: All Labs Reviewed:                        13.5   6.38  )-----------( 210      ( 03 Dec 2021 07:23 )             38.8                         12.6   5.05  )-----------( 186      ( 02 Dec 2021 08:04 )             36.2     03 Dec 2021 07:23    147    |  116    |  16     ----------------------------<  83     4.3     |  23     |  1.70   02 Dec 2021 08:04    143    |  110    |  24     ----------------------------<  95     4.1     |  26     |  1.70     Ca    8.7        03 Dec 2021 07:23  Ca    8.3        02 Dec 2021 08:04  Phos  3.1       02 Dec 2021 08:04    TPro  6.2    /  Alb  2.5    /  TBili  0.8    /  DBili  x      /  AST  11     /  ALT  7      /  AlkPhos  41     02 Dec 2021 08:04          Blood Culture:         RADIOLOGY/EKG:    Attending Attestation:   20 minutes spent on total encounter; more than 50% of the visit was spent counseling and/or coordinating care by the attending physician.     ASSESSMENT AND PLAN

## 2021-12-04 NOTE — PHYSICAL THERAPY INITIAL EVALUATION ADULT - LEVEL OF INDEPENDENCE: GAIT, REHAB EVAL
Close supervision with amb with rolling walker, contact guard for amb without assistive device./contact guard

## 2021-12-04 NOTE — PHYSICAL THERAPY INITIAL EVALUATION ADULT - BED MOBILITY TRAINING, PT EVAL
(3 - 5 sessions) Pt will be able to transfer sit to supine independently to improve function and mobility upon d/c.

## 2021-12-04 NOTE — PROGRESS NOTE ADULT - SUBJECTIVE AND OBJECTIVE BOX
Patient is a 85y old  Male who presents with a chief complaint of Acute appendicitis (04 Dec 2021 15:40)      INTERVAL /OVERNIGHT EVENTS: no further pain    MEDICATIONS  (STANDING):  amLODIPine   Tablet 2.5 milliGRAM(s) Oral daily  atorvastatin 10 milliGRAM(s) Oral at bedtime  calcitriol   Capsule 0.25 MICROGram(s) Oral daily  carbidopa/levodopa CR 25/100 1.5 Tablet(s) Oral <User Schedule>  gabapentin 100 milliGRAM(s) Oral three times a day  oxybutynin 5 milliGRAM(s) Oral two times a day  piperacillin/tazobactam IVPB.. 3.375 Gram(s) IV Intermittent every 8 hours  polyethylene glycol 3350 17 Gram(s) Oral daily  pramipexole 1 milliGRAM(s) Oral daily  senna 2 Tablet(s) Oral at bedtime    MEDICATIONS  (PRN):  bisacodyl 5 milliGRAM(s) Oral every 12 hours PRN Constipation  glycerin Suppository - Adult 1 Suppository(s) Rectal daily PRN Constipation  morphine  - Injectable 2 milliGRAM(s) IV Push every 6 hours PRN Severe Pain (7 - 10)  ondansetron Injectable 4 milliGRAM(s) IV Push four times a day PRN Nausea and/or Vomiting      Allergies    No Known Allergies    Intolerances        REVIEW OF SYSTEMS:  CONSTITUTIONAL: No fever, weight loss, or fatigue  EYES: No eye pain, visual disturbances, or discharge  ENMT:  No difficulty hearing, tinnitus, vertigo; No sinus or throat pain  NECK: No pain or stiffness  RESPIRATORY: No cough, wheezing, chills or hemoptysis; No shortness of breath  CARDIOVASCULAR: No chest pain, palpitations, dizziness, or leg swelling  GASTROINTESTINAL: No abdominal or epigastric pain. No nausea, vomiting, or hematemesis; No diarrhea or constipation. No melena or hematochezia.  GENITOURINARY: No dysuria, frequency, hematuria, or incontinence  NEUROLOGICAL: No headaches, memory loss, loss of strength, numbness, or tremors  SKIN: No itching, burning, rashes, or lesions   LYMPH NODES: No enlarged glands  ENDOCRINE: No heat or cold intolerance; No hair loss; No polydipsia or polyuria  MUSCULOSKELETAL: No joint pain or swelling; No muscle, back, or extremity pain  PSYCHIATRIC: No depression, anxiety, mood swings, or difficulty sleeping  HEME/LYMPH: No easy bruising, or bleeding gums  ALLERGY AND IMMUNOLOGIC: No hives or eczema    Vital Signs Last 24 Hrs  T(C): 36.6 (04 Dec 2021 14:17), Max: 37 (03 Dec 2021 20:05)  T(F): 97.8 (04 Dec 2021 14:17), Max: 98.6 (03 Dec 2021 20:05)  HR: 70 (04 Dec 2021 14:17) (59 - 73)  BP: 143/84 (04 Dec 2021 14:17) (111/68 - 143/84)  BP(mean): --  RR: 18 (04 Dec 2021 14:17) (18 - 18)  SpO2: 92% (04 Dec 2021 14:17) (92% - 94%)    PHYSICAL EXAM:  GENERAL: NAD, well-groomed, well-developed  HEAD:  Atraumatic, Normocephalic  EYES: EOMI, PERRLA, conjunctiva and sclera clear  ENMT: No tonsillar erythema, exudates, or enlargement; Moist mucous membranes, Good dentition, No lesions  NECK: Supple, No JVD, Normal thyroid  NERVOUS SYSTEM:  Alert & Oriented X3, Good concentration; Motor Strength 5/5 B/L upper and lower extremities; DTRs 2+ intact and symmetric  CHEST/LUNG: Clear to auscultation bilaterally; No rales, rhonchi, wheezing, or rubs  HEART: Regular rate and rhythm; No murmurs, rubs, or gallops  ABDOMEN: Soft, Nontender, Nondistended; Bowel sounds present  EXTREMITIES:  2+ Peripheral Pulses, No clubbing, cyanosis, or edema  LYMPH: No lymphadenopathy noted  SKIN: No rashes or lesions    LABS:                        12.5   5.59  )-----------( 198      ( 04 Dec 2021 08:23 )             36.8     04 Dec 2021 08:23    143    |  115    |  16     ----------------------------<  100    3.9     |  22     |  1.60     Ca    8.3        04 Dec 2021 08:23    TPro  6.3    /  Alb  2.5    /  TBili  0.6    /  DBili  x      /  AST  21     /  ALT  8      /  AlkPhos  43     04 Dec 2021 08:23        CAPILLARY BLOOD GLUCOSE          RADIOLOGY & ADDITIONAL TESTS:    Notes Reviewed:  [x ] YES  [ ] NO    Care Discussed with Consultants/Other Providers [ x] YES  [ ] NO

## 2021-12-04 NOTE — PHYSICAL THERAPY INITIAL EVALUATION ADULT - PERTINENT HX OF CURRENT PROBLEM, REHAB EVAL
Pt is an 84 y/o M presenting with R lower abdominal pain x3-4 days, found to have acute appendicitis vs colonic mass. Pt s/p colonscopy 12/3/21.

## 2021-12-04 NOTE — PHYSICAL THERAPY INITIAL EVALUATION ADULT - DISCHARGE DISPOSITION, PT EVAL
subacute rehab vs home with home care PT, pending stair negotiation. Pt currently min A for balance on 2 stairs; pt with 13 steps at home/home w/ home PT/rehabilitation facility

## 2021-12-04 NOTE — PHYSICAL THERAPY INITIAL EVALUATION ADULT - ADDITIONAL COMMENTS
Pt reports prior to admission he was independent and was able to cut his own lawn. Pt with 13 steps in home, does not use assistive device, lives with his wife.

## 2021-12-04 NOTE — PROGRESS NOTE ADULT - ASSESSMENT
84 yo M PMHx Parkinson's disease, HTN, HLD presents to ED c/o right sided abd pain x 3-4 days. Pt states pain is constant aching 7/10 pain. pt also c/o chronic constipation, takes Miralax nightly, admits to smaller BMs in the last few days. last BM 2 days ago. Pt denies N/V/D, fever/chills, urinary symptoms,  SOB.   CT abdom c/w acute AP, also suspicious mass.  For colonoscopy.  On further questioning, he reports chest pain at mild level at R breast region for about 5 days, started about same time as when RLQ pain started.  I spoke with wife who confirms that he did report the pain to her at same time as the abdom pain.  She thought pain was referred pain from abdom.  She reports he has not seem short of breath, no edema and no palpitation.      s/p cp earlier this week,, could be referred pain.    No chest wall tenderness, mild level and he reports it has been constantly present.      Check one set Trop-NEGATIVE      Prior TTE 11/2019 with normal EF and mild AS.  Prior EKG with IRBBB.   Today's Echo shows normal LVEF with mild AS    Cardiac optimized for colonoscopy    Above d/w pt's wife and Dr Kelly   Will follow 85M w/ Parkinson's disease, HTN, HLD presents to ED c/o right sided abd pain x 3-4 days with suspicion for acute appendicitis and possible mass.  Now s/p colonoscopy. Still with RIGHT sided chest discomfort.  No short of breath, no edema and no palpitations    Suggest:  1.  Possible appendicitis s/p colonoscopy awaiting bx/pathology results  Cardiac optimized for lap Appy    2. Chest discomfort Atypical Right sided  Trop-NEGATIVE, doubt cardiac etiology  Echo unchanged with no gross wall motion abnormalities    Prior TTE 11/2019 with normal EF and mild AS.  Prior EKG with IRBBB.   Yesterday Echo shows normal LVEF with mild AS      Above d/w pt's wife and Dr Kelly   Will follow

## 2021-12-04 NOTE — PROGRESS NOTE ADULT - SUBJECTIVE AND OBJECTIVE BOX
SURGERY PA NOTE ON BEHALF OF DR. DENNIS (COVERING FOR DR. COOL) / GENERAL SURGERY:    S: Patient seen and examined at bedside.  Denies complaints, resting comfortably.  Tolerating regular diet.      MEDICATIONS:  amLODIPine   Tablet 2.5 milliGRAM(s) Oral daily  atorvastatin 10 milliGRAM(s) Oral at bedtime  bisacodyl 5 milliGRAM(s) Oral every 12 hours PRN  calcitriol   Capsule 0.25 MICROGram(s) Oral daily  carbidopa/levodopa CR 25/100 1.5 Tablet(s) Oral <User Schedule>  gabapentin 100 milliGRAM(s) Oral three times a day  glycerin Suppository - Adult 1 Suppository(s) Rectal daily PRN  morphine  - Injectable 2 milliGRAM(s) IV Push every 6 hours PRN  ondansetron Injectable 4 milliGRAM(s) IV Push four times a day PRN  oxybutynin 5 milliGRAM(s) Oral two times a day  pantoprazole  Injectable 40 milliGRAM(s) IV Push daily  piperacillin/tazobactam IVPB.. 3.375 Gram(s) IV Intermittent every 8 hours  polyethylene glycol 3350 17 Gram(s) Oral daily  pramipexole 1 milliGRAM(s) Oral daily  senna 2 Tablet(s) Oral at bedtime      O:  Vital Signs Last 24 Hrs  T(C): 36.5 (04 Dec 2021 04:57), Max: 37 (03 Dec 2021 20:05)  T(F): 97.7 (04 Dec 2021 04:57), Max: 98.6 (03 Dec 2021 20:05)  HR: 59 (04 Dec 2021 04:57) (59 - 73)  BP: 111/68 (04 Dec 2021 04:57) (111/68 - 145/72)  RR: 18 (04 Dec 2021 04:57) (16 - 18)  SpO2: 94% (04 Dec 2021 04:57) (93% - 94%)      PHYSICAL EXAM:  Lungs: CTA bilat without W/R/R  Card: S1S2  Abd: Mild to moderate distention noted.  Tender RLQ.  (Small region of ecchymosis at site of chemoprophylaxis injection)  +BS x 4.  No rebound/guarding.    Ext: Calves soft, NT, without edema bilat    LABS:                        12.5   5.59  )-----------( 198      ( 04 Dec 2021 08:23 )             36.8     12-03    147<H>  |  116<H>  |  16  ----------------------------<  83  4.3   |  23  |  1.70<H>    Ca    8.7      03 Dec 2021 07:23      A:  85-yo male with h/o Parkinson's disease, HTN; a/w appendicitis/appendiceal mass  S/P colonoscopy POD #1    P:  Patient currently without complaints  Tolerating diet  Vitals stable and reassuring, remains afebrile  No leukocytosis, H&H stable   Metabolic panel pending for today   Awaiting result of pathology of specimen from colonoscopy yesterday  Further surgical decision-making predicated upon findings  For now, continue current diet, continue care per primary team  Will d/w Dr. Dennis  Will follow      SURGERY PA NOTE ON BEHALF OF DR. DENNIS (COVERING FOR DR. COOL) / GENERAL SURGERY:    S: Patient seen and examined at bedside.  Denies complaints, resting comfortably.  Tolerating regular diet.      MEDICATIONS:  amLODIPine   Tablet 2.5 milliGRAM(s) Oral daily  atorvastatin 10 milliGRAM(s) Oral at bedtime  bisacodyl 5 milliGRAM(s) Oral every 12 hours PRN  calcitriol   Capsule 0.25 MICROGram(s) Oral daily  carbidopa/levodopa CR 25/100 1.5 Tablet(s) Oral <User Schedule>  gabapentin 100 milliGRAM(s) Oral three times a day  glycerin Suppository - Adult 1 Suppository(s) Rectal daily PRN  morphine  - Injectable 2 milliGRAM(s) IV Push every 6 hours PRN  ondansetron Injectable 4 milliGRAM(s) IV Push four times a day PRN  oxybutynin 5 milliGRAM(s) Oral two times a day  pantoprazole  Injectable 40 milliGRAM(s) IV Push daily  piperacillin/tazobactam IVPB.. 3.375 Gram(s) IV Intermittent every 8 hours  polyethylene glycol 3350 17 Gram(s) Oral daily  pramipexole 1 milliGRAM(s) Oral daily  senna 2 Tablet(s) Oral at bedtime      O:  Vital Signs Last 24 Hrs  T(C): 36.5 (04 Dec 2021 04:57), Max: 37 (03 Dec 2021 20:05)  T(F): 97.7 (04 Dec 2021 04:57), Max: 98.6 (03 Dec 2021 20:05)  HR: 59 (04 Dec 2021 04:57) (59 - 73)  BP: 111/68 (04 Dec 2021 04:57) (111/68 - 145/72)  RR: 18 (04 Dec 2021 04:57) (16 - 18)  SpO2: 94% (04 Dec 2021 04:57) (93% - 94%)      PHYSICAL EXAM:  Lungs: CTA bilat without W/R/R  Card: S1S2  Abd: Mild distention noted.  Tender RLQ.  (Small region of ecchymosis at site of chemoprophylaxis injection)  +BS x 4.  No rebound/guarding.    Ext: Calves soft, NT, without edema bilat    LABS:                        12.5   5.59  )-----------( 198      ( 04 Dec 2021 08:23 )             36.8     12-03    147<H>  |  116<H>  |  16  ----------------------------<  83  4.3   |  23  |  1.70<H>    Ca    8.7      03 Dec 2021 07:23      A:  85-yo male with h/o Parkinson's disease, HTN; a/w appendicitis/appendiceal mass  S/P colonoscopy POD #1    P:  Patient currently without complaints  Tolerating diet  Vitals stable and reassuring, remains afebrile  No leukocytosis, H&H stable   Metabolic panel pending for today   Awaiting result of pathology of specimen from colonoscopy yesterday  Further surgical decision-making predicated upon findings  For now, continue current diet, continue care per primary team  Will d/w Dr. Dennis  Will follow      SURGERY PA NOTE ON BEHALF OF DR. DENNIS (COVERING FOR DR. COOL) / GENERAL SURGERY:    S: Patient seen and examined at bedside.  Denies complaints, resting comfortably.  Tolerating regular diet.      MEDICATIONS:  amLODIPine   Tablet 2.5 milliGRAM(s) Oral daily  atorvastatin 10 milliGRAM(s) Oral at bedtime  bisacodyl 5 milliGRAM(s) Oral every 12 hours PRN  calcitriol   Capsule 0.25 MICROGram(s) Oral daily  carbidopa/levodopa CR 25/100 1.5 Tablet(s) Oral <User Schedule>  gabapentin 100 milliGRAM(s) Oral three times a day  glycerin Suppository - Adult 1 Suppository(s) Rectal daily PRN  morphine  - Injectable 2 milliGRAM(s) IV Push every 6 hours PRN  ondansetron Injectable 4 milliGRAM(s) IV Push four times a day PRN  oxybutynin 5 milliGRAM(s) Oral two times a day  pantoprazole  Injectable 40 milliGRAM(s) IV Push daily  piperacillin/tazobactam IVPB.. 3.375 Gram(s) IV Intermittent every 8 hours  polyethylene glycol 3350 17 Gram(s) Oral daily  pramipexole 1 milliGRAM(s) Oral daily  senna 2 Tablet(s) Oral at bedtime      O:  Vital Signs Last 24 Hrs  T(C): 36.5 (04 Dec 2021 04:57), Max: 37 (03 Dec 2021 20:05)  T(F): 97.7 (04 Dec 2021 04:57), Max: 98.6 (03 Dec 2021 20:05)  HR: 59 (04 Dec 2021 04:57) (59 - 73)  BP: 111/68 (04 Dec 2021 04:57) (111/68 - 145/72)  RR: 18 (04 Dec 2021 04:57) (16 - 18)  SpO2: 94% (04 Dec 2021 04:57) (93% - 94%)      PHYSICAL EXAM:  Lungs: CTA bilat without W/R/R  Card: S1S2  Abd: Mild distention noted.  Tender RLQ.  +BS x 4.  No rebound/guarding.    Ext: Calves soft, NT, without edema bilat    LABS:                        12.5   5.59  )-----------( 198      ( 04 Dec 2021 08:23 )             36.8     12-03    147<H>  |  116<H>  |  16  ----------------------------<  83  4.3   |  23  |  1.70<H>    Ca    8.7      03 Dec 2021 07:23      A:  85-yo male with h/o Parkinson's disease, HTN; a/w appendicitis/appendiceal mass  S/P colonoscopy POD #1    P:  Patient currently without complaints  Tolerating diet  Vitals stable and reassuring, remains afebrile  No leukocytosis, H&H stable   Metabolic panel pending for today   Awaiting result of pathology of specimen from colonoscopy yesterday  Further surgical decision-making predicated upon findings  For now, continue current diet, continue care per primary team  Will d/w Dr. Dennis  Will follow

## 2021-12-04 NOTE — PHYSICAL THERAPY INITIAL EVALUATION ADULT - GAIT TRAINING, PT EVAL
(3 - 5 sessions) Pt will ambulate 200 feet with least restrictive device to improve mobility upon d/c.

## 2021-12-04 NOTE — PROGRESS NOTE ADULT - SUBJECTIVE AND OBJECTIVE BOX
[facility]  [place] [address] [phone]    INTERVAL HPI/OVERNIGHT EVENTS:  Pt resting comfortably. No acute complaints.   Tolerating reg diet.   small amount of flatus, -BM.   Denies N/V.  On Zosyn.    MEDICATIONS  (STANDING):  amLODIPine   Tablet 2.5 milliGRAM(s) Oral daily  atorvastatin 10 milliGRAM(s) Oral at bedtime  calcitriol   Capsule 0.25 MICROGram(s) Oral daily  carbidopa/levodopa CR 25/100 1.5 Tablet(s) Oral <User Schedule>  gabapentin 100 milliGRAM(s) Oral three times a day  oxybutynin 5 milliGRAM(s) Oral two times a day  pantoprazole  Injectable 40 milliGRAM(s) IV Push daily  piperacillin/tazobactam IVPB.. 3.375 Gram(s) IV Intermittent every 8 hours  polyethylene glycol 3350 17 Gram(s) Oral daily  pramipexole 1 milliGRAM(s) Oral daily  senna 2 Tablet(s) Oral at bedtime    MEDICATIONS  (PRN):  bisacodyl 5 milliGRAM(s) Oral every 12 hours PRN Constipation  glycerin Suppository - Adult 1 Suppository(s) Rectal daily PRN Constipation  morphine  - Injectable 2 milliGRAM(s) IV Push every 6 hours PRN Severe Pain (7 - 10)  ondansetron Injectable 4 milliGRAM(s) IV Push four times a day PRN Nausea and/or Vomiting      Vital Signs Last 24 Hrs  T(C): 36.5 (04 Dec 2021 04:57), Max: 37 (03 Dec 2021 20:05)  T(F): 97.7 (04 Dec 2021 04:57), Max: 98.6 (03 Dec 2021 20:05)  HR: 59 (04 Dec 2021 04:57) (59 - 73)  BP: 111/68 (04 Dec 2021 04:57) (111/68 - 145/72)  BP(mean): --  RR: 18 (04 Dec 2021 04:57) (17 - 18)  SpO2: 94% (04 Dec 2021 04:57) (93% - 94%)    Physical:  General: A&Ox3. NAD.  Abdomen: Soft nondistended, nontender.    LABS:                        12.5   5.59  )-----------( 198      ( 04 Dec 2021 08:23 )             36.8             12-04    143  |  115<H>  |  16  ----------------------------<  100<H>  3.9   |  22  |  1.60<H>    Ca    8.3<L>      04 Dec 2021 08:23    TPro  6.3  /  Alb  2.5<L>  /  TBili  0.6  /  DBili  x   /  AST  21  /  ALT  8<L>  /  AlkPhos  43  12-04    Carcinoembryonic Antigen: 3.1

## 2021-12-04 NOTE — PROGRESS NOTE ADULT - SUBJECTIVE AND OBJECTIVE BOX
[facility]  INFECTIOUS DISEASES   [address] [place] [address]  Tel: [phone]     Fax: [phone]  ======================================================  MD Bubba Gallegos Kaushal, MD Cho, Michelle, MD   ======================================================    [id number]  NAVA VITGERARDBUSHRAILIANA     Follow up: Acute appendicitis ?    No abdominal pain, on regular diet tolerating well.   No fever. Colonoscopy with some polyps but no mass in cecum, biopsy done.     PAST MEDICAL & SURGICAL HISTORY:  Parkinson disease  Shingles  HTN (hypertension)  Hypercholesteremia  Mild asthma  No significant past surgical history    Social Hx: no smoking, ETOH or drugs     FAMILY HISTORY:  No pertinent family history in first degree relatives    Allergies  No Known Allergies    Antibiotics:  piperacillin/tazobactam IVPB.. 3.375 Gram(s) IV Intermittent every 8 hours    REVIEW OF SYSTEMS:  CONSTITUTIONAL:  No Fever or chills  HEENT:  No diplopia or blurred vision.  No sore throat or runny nose.  CARDIOVASCULAR:  No chest pain or SOB.  RESPIRATORY:  No cough, shortness of breath, PND or orthopnea.  GASTROINTESTINAL:  No nausea, vomiting or diarrhea.  GENITOURINARY:  No dysuria, frequency or urgency. No Blood in urine  MUSCULOSKELETAL:  no joint aches, no muscle pain  SKIN:  No change in skin, hair or nails.  NEUROLOGIC:  No paresthesias, fasciculations, seizures or weakness.  PSYCHIATRIC:  No disorder of thought or mood.  ENDOCRINE:  No heat or cold intolerance, polyuria or polydipsia.  HEMATOLOGICAL:  No easy bruising or bleeding.     Physical Exam:  Vital Signs Last 24 Hrs  T(C): 36.5 (04 Dec 2021 04:57), Max: 37 (03 Dec 2021 20:05)  T(F): 97.7 (04 Dec 2021 04:57), Max: 98.6 (03 Dec 2021 20:05)  HR: 59 (04 Dec 2021 04:57) (59 - 73)  BP: 111/68 (04 Dec 2021 04:57) (111/68 - 130/81)  BP(mean): --  RR: 18 (04 Dec 2021 04:57) (18 - 18)  SpO2: 94% (04 Dec 2021 04:57) (93% - 94%)  GEN: NAD  HEENT: normocephalic and atraumatic. EOMI. PERRL.    NECK: Supple.  No lymphadenopathy   LUNGS: Clear to auscultation.  HEART: Regular rate and rhythm without murmur.  ABDOMEN: Soft, nontender, and nondistended.  Positive bowel sounds.    : No CVA tenderness  EXTREMITIES: Without any cyanosis, clubbing, rash, lesions or edema.  NEUROLOGIC: grossly intact.  PSYCHIATRIC: Appropriate affect .  SKIN: No ulceration or induration present.      Labs:                        12.5   5.59  )-----------( 198      ( 04 Dec 2021 08:23 )             36.8     12-04    143  |  115<H>  |  16  ----------------------------<  100<H>  3.9   |  22  |  1.60<H>    Ca    8.3<L>      04 Dec 2021 08:23    TPro  6.3  /  Alb  2.5<L>  /  TBili  0.6  /  DBili  x   /  AST  21  /  ALT  8<L>  /  AlkPhos  43  12-04    WBC Count: 5.59 K/uL (12-04-21 @ 08:23)  WBC Count: 6.38 K/uL (12-03-21 @ 07:23)  WBC Count: 5.05 K/uL (12-02-21 @ 08:04)  WBC Count: 9.99 K/uL (11-30-21 @ 12:59)    Creatinine, Serum: 1.60 mg/dL (12-04-21 @ 08:23)  Creatinine, Serum: 1.70 mg/dL (12-03-21 @ 07:23)  Creatinine, Serum: 1.70 mg/dL (12-02-21 @ 08:04)  Creatinine, Serum: 1.70 mg/dL (12-01-21 @ 07:10)  Creatinine, Serum: 1.80 mg/dL (11-30-21 @ 12:59)    COVID-19 PCR: NotDetec (12-02-21 @ 07:44)  COVID-19 PCR: NotDetec (11-30-21 @ 13:09)    All imaging and other data have been reviewed.  < from: CT Abdomen and Pelvis w/ Oral Cont (11.30.21 @ 15:59) >  IMPRESSION:  Acute nonperforated appendicitis with soft tissue mass at the base of the appendix measuring approximately 3.4 cm in diameter. Findings are concerning for malignancy.    Assessment and Plan:   84 yo man with PMH of HTN, asthma and parkinson's disease was admitted with right lower abdominal pain for 3-4 days.   CT was done showing possible acute appendicitis with no perforation but also malignancy was a concern.     S/P Colonoscopy on 12/03.     Acute appendicitis:  - Surgery follow up noted  - GI follow up, will follow pathology result  - Continue zosyn 3.375gm q8h to cover GI alysia  - possible switch to oral ABx.     Will follow.  Discussed with the primary team.     Suzanne Miller MD  Division of Infectious Diseases   Cell [phone] between 8am and 6pm   After 6pm and weekends please call ID service at [phone].     30 minutes spent on total encounter assessing patient, examination, chart reivew, counseling and coordinating care by the attending physician/nurse/care manager.

## 2021-12-04 NOTE — PHYSICAL THERAPY INITIAL EVALUATION ADULT - STRENGTHENING, PT EVAL
(5 sessions) Pt will be able to negotiate 12 steps with contact guard in order to be able to return home safely.

## 2021-12-04 NOTE — PHYSICAL THERAPY INITIAL EVALUATION ADULT - GAIT DEVIATIONS NOTED, PT EVAL
shuffling gait, significantly decreased stride length, decreased heel strike bilaterally/decreased pattie/increased time in double stance

## 2021-12-04 NOTE — PHYSICAL THERAPY INITIAL EVALUATION ADULT - ACTIVE RANGE OF MOTION EXAMINATION, REHAB EVAL
Decreased R dorsiflexion AROM during ambulation with decreased heel strike noted, decreased knee flexion AROM during gait (shuffling gait)/deficits as listed below

## 2021-12-04 NOTE — PROGRESS NOTE ADULT - ASSESSMENT
85y.o. Male with colonic mass vs appendicitis    s/p colonoscopy - appendiceal orifice area biopsied  f/u path  CEA WNL  surgery team awaiting path  oncology to see pt pending path  con't diet as tolerated  con't abx    Advanced care planning was discussed with patient and family.  Advanced care planning forms were reviewed and discussed.  Risks, benefits and alternatives of gastroenterologic procedures were discussed in detail and all questions were answered.    30 minutes spent.

## 2021-12-05 LAB
ANION GAP SERPL CALC-SCNC: 4 MMOL/L — LOW (ref 5–17)
BUN SERPL-MCNC: 22 MG/DL — SIGNIFICANT CHANGE UP (ref 7–23)
CALCIUM SERPL-MCNC: 8.9 MG/DL — SIGNIFICANT CHANGE UP (ref 8.5–10.1)
CHLORIDE SERPL-SCNC: 112 MMOL/L — HIGH (ref 96–108)
CO2 SERPL-SCNC: 28 MMOL/L — SIGNIFICANT CHANGE UP (ref 22–31)
CREAT SERPL-MCNC: 2 MG/DL — HIGH (ref 0.5–1.3)
GLUCOSE SERPL-MCNC: 95 MG/DL — SIGNIFICANT CHANGE UP (ref 70–99)
POTASSIUM SERPL-MCNC: 4.2 MMOL/L — SIGNIFICANT CHANGE UP (ref 3.5–5.3)
POTASSIUM SERPL-SCNC: 4.2 MMOL/L — SIGNIFICANT CHANGE UP (ref 3.5–5.3)
SODIUM SERPL-SCNC: 144 MMOL/L — SIGNIFICANT CHANGE UP (ref 135–145)

## 2021-12-05 RX ORDER — SODIUM CHLORIDE 9 MG/ML
1000 INJECTION, SOLUTION INTRAVENOUS
Refills: 0 | Status: DISCONTINUED | OUTPATIENT
Start: 2021-12-05 | End: 2021-12-06

## 2021-12-05 RX ADMIN — AMLODIPINE BESYLATE 2.5 MILLIGRAM(S): 2.5 TABLET ORAL at 05:28

## 2021-12-05 RX ADMIN — CARBIDOPA AND LEVODOPA 1.5 TABLET(S): 25; 100 TABLET ORAL at 15:11

## 2021-12-05 RX ADMIN — GABAPENTIN 100 MILLIGRAM(S): 400 CAPSULE ORAL at 21:50

## 2021-12-05 RX ADMIN — PRAMIPEXOLE DIHYDROCHLORIDE 1 MILLIGRAM(S): 0.12 TABLET ORAL at 11:23

## 2021-12-05 RX ADMIN — PIPERACILLIN AND TAZOBACTAM 25 GRAM(S): 4; .5 INJECTION, POWDER, LYOPHILIZED, FOR SOLUTION INTRAVENOUS at 21:50

## 2021-12-05 RX ADMIN — SENNA PLUS 2 TABLET(S): 8.6 TABLET ORAL at 21:50

## 2021-12-05 RX ADMIN — ATORVASTATIN CALCIUM 10 MILLIGRAM(S): 80 TABLET, FILM COATED ORAL at 21:50

## 2021-12-05 RX ADMIN — PIPERACILLIN AND TAZOBACTAM 25 GRAM(S): 4; .5 INJECTION, POWDER, LYOPHILIZED, FOR SOLUTION INTRAVENOUS at 05:28

## 2021-12-05 RX ADMIN — Medication 5 MILLIGRAM(S): at 05:28

## 2021-12-05 RX ADMIN — CARBIDOPA AND LEVODOPA 1.5 TABLET(S): 25; 100 TABLET ORAL at 05:28

## 2021-12-05 RX ADMIN — GABAPENTIN 100 MILLIGRAM(S): 400 CAPSULE ORAL at 05:28

## 2021-12-05 RX ADMIN — SODIUM CHLORIDE 50 MILLILITER(S): 9 INJECTION, SOLUTION INTRAVENOUS at 23:00

## 2021-12-05 RX ADMIN — CALCITRIOL 0.25 MICROGRAM(S): 0.5 CAPSULE ORAL at 11:23

## 2021-12-05 RX ADMIN — GABAPENTIN 100 MILLIGRAM(S): 400 CAPSULE ORAL at 15:11

## 2021-12-05 RX ADMIN — PIPERACILLIN AND TAZOBACTAM 25 GRAM(S): 4; .5 INJECTION, POWDER, LYOPHILIZED, FOR SOLUTION INTRAVENOUS at 15:11

## 2021-12-05 RX ADMIN — CARBIDOPA AND LEVODOPA 1.5 TABLET(S): 25; 100 TABLET ORAL at 22:45

## 2021-12-05 NOTE — PROGRESS NOTE ADULT - SUBJECTIVE AND OBJECTIVE BOX
[facility]  Dakota Carterblaise [place] [address] [phone]    INTERVAL HPI/OVERNIGHT EVENTS:  Pt resting comfortably. No acute complaints.   Tolerating reg diet.   +flatus/BM.   Denies N/V.    MEDICATIONS  (STANDING):  amLODIPine   Tablet 2.5 milliGRAM(s) Oral daily  atorvastatin 10 milliGRAM(s) Oral at bedtime  calcitriol   Capsule 0.25 MICROGram(s) Oral daily  carbidopa/levodopa CR 25/100 1.5 Tablet(s) Oral <User Schedule>  gabapentin 100 milliGRAM(s) Oral three times a day  oxybutynin 5 milliGRAM(s) Oral two times a day  piperacillin/tazobactam IVPB.. 3.375 Gram(s) IV Intermittent every 8 hours  polyethylene glycol 3350 17 Gram(s) Oral daily  pramipexole 1 milliGRAM(s) Oral daily  senna 2 Tablet(s) Oral at bedtime    MEDICATIONS  (PRN):  bisacodyl 5 milliGRAM(s) Oral every 12 hours PRN Constipation  glycerin Suppository - Adult 1 Suppository(s) Rectal daily PRN Constipation  morphine  - Injectable 2 milliGRAM(s) IV Push every 6 hours PRN Severe Pain (7 - 10)  ondansetron Injectable 4 milliGRAM(s) IV Push four times a day PRN Nausea and/or Vomiting    Vital Signs Last 24 Hrs  T(C): 36.6 (05 Dec 2021 05:05), Max: 36.9 (04 Dec 2021 20:04)  T(F): 97.8 (05 Dec 2021 05:05), Max: 98.5 (04 Dec 2021 20:04)  HR: 68 (05 Dec 2021 05:05) (68 - 94)  BP: 125/74 (05 Dec 2021 05:05) (125/74 - 153/95)  BP(mean): --  RR: 18 (05 Dec 2021 05:05) (17 - 18)  SpO2: 94% (05 Dec 2021 05:05) (92% - 94%)    Physical:  General: A&Ox3. NAD.  Abdomen: Soft nondistended, nontender.    I&O's Detail    04 Dec 2021 07:01  -  05 Dec 2021 07:00  --------------------------------------------------------  IN:    IV PiggyBack: 175 mL    Oral Fluid: 960 mL  Total IN: 1135 mL    OUT:  Total OUT: 0 mL    Total NET: 1135 mL      05 Dec 2021 07:01  -  05 Dec 2021 10:13  --------------------------------------------------------  IN:    IV PiggyBack: 25 mL  Total IN: 25 mL    OUT:  Total OUT: 0 mL    Total NET: 25 mL    LABS:                        12.5   5.59  )-----------( 198      ( 04 Dec 2021 08:23 )             36.8             12-05    144  |  112<H>  |  22  ----------------------------<  95  4.2   |  28  |  2.00<H>    Ca    8.9      05 Dec 2021 07:29    TPro  6.3  /  Alb  2.5<L>  /  TBili  0.6  /  DBili  x   /  AST  21  /  ALT  8<L>  /  AlkPhos  43  12-04

## 2021-12-05 NOTE — PROGRESS NOTE ADULT - SUBJECTIVE AND OBJECTIVE BOX
Resting, no CP, SOB    Vital Signs Last 24 Hrs  T(C): 36.5 (12-05-21 @ 20:56), Max: 36.8 (12-05-21 @ 04:10)  T(F): 97.7 (12-05-21 @ 20:56), Max: 98.3 (12-05-21 @ 13:05)  HR: 68 (12-05-21 @ 20:56) (60 - 74)  BP: 135/80 (12-05-21 @ 20:56) (125/74 - 135/82)  RR: 17 (12-05-21 @ 20:56) (17 - 18)  SpO2: 91% (12-05-21 @ 20:56) (91% - 94%)    Respiratory: b/l air entry  Cardiovascular: S1 S2  Gastrointestinal: soft, ND  Extremities: tr  edema                        12.5   5.59  )-----------( 198      ( 04 Dec 2021 08:23 )             36.8     05 Dec 2021 07:29    144    |  112    |  22     ----------------------------<  95     4.2     |  28     |  2.00     Ca    8.9        05 Dec 2021 07:29    TPro  6.3    /  Alb  2.5    /  TBili  0.6    /  DBili  x      /  AST  21     /  ALT  8      /  AlkPhos  43     04 Dec 2021 08:23    LIVER FUNCTIONS - ( 04 Dec 2021 08:23 )  Alb: 2.5 g/dL / Pro: 6.3 g/dL / ALK PHOS: 43 U/L / ALT: 8 U/L / AST: 21 U/L / GGT: x           amLODIPine   Tablet 2.5 milliGRAM(s) Oral daily  atorvastatin 10 milliGRAM(s) Oral at bedtime  bisacodyl 5 milliGRAM(s) Oral every 12 hours PRN  calcitriol   Capsule 0.25 MICROGram(s) Oral daily  carbidopa/levodopa CR 25/100 1.5 Tablet(s) Oral <User Schedule>  gabapentin 100 milliGRAM(s) Oral three times a day  glycerin Suppository - Adult 1 Suppository(s) Rectal daily PRN  morphine  - Injectable 2 milliGRAM(s) IV Push every 6 hours PRN  ondansetron Injectable 4 milliGRAM(s) IV Push four times a day PRN  piperacillin/tazobactam IVPB.. 3.375 Gram(s) IV Intermittent every 8 hours  polyethylene glycol 3350 17 Gram(s) Oral daily  pramipexole 1 milliGRAM(s) Oral daily  senna 2 Tablet(s) Oral at bedtime    Assessment and Plan:   	  Appendicitis  For OR on this adm  Baseline CKD 3  Rising Cr, likely pre-renal  Gentle IVF  Avoid nephrotoxins  BMP in am    [phone]

## 2021-12-05 NOTE — PROGRESS NOTE ADULT - SUBJECTIVE AND OBJECTIVE BOX
Hospital day: 5    85y Male admitted with Acute appendicitis  S/P colonoscopy    Pt seen and resting comfortably.  Continues to have some right sided/lower quadrant abdominal discomfort.  No overnight events.  Is tolerating PO intake.  Passing flatus, had a BM yesterday.  Awaiting Pathology report from colonoscopy    T(F): 97.8 (12-05-21 @ 05:05), Max: 98.5 (12-04-21 @ 20:04)  HR: 68 (12-05-21 @ 05:05) (68 - 94)  BP: 125/74 (12-05-21 @ 05:05) (125/74 - 153/95)  RR: 18 (12-05-21 @ 05:05) (17 - 18)  SpO2: 94% (12-05-21 @ 05:05) (92% - 94%)  Wt(kg): --  CAPILLARY BLOOD GLUCOSE          PHYSICAL EXAM:  General: NAD  Neuro:  Alert & oriented x 3  Abdomen: BS+ Soft.  ND.  Mild tenderness to RLQ  Extremities: no pedal edema or calf tenderness noted       LABS:                        12.5   5.59  )-----------( 198      ( 04 Dec 2021 08:23 )             36.8     12-05    144  |  112<H>  |  22  ----------------------------<  95  4.2   |  28  |  2.00<H>    Ca    8.9      05 Dec 2021 07:29    TPro  6.3  /  Alb  2.5<L>  /  TBili  0.6  /  DBili  x   /  AST  21  /  ALT  8<L>  /  AlkPhos  43  12-04      I&O's Detail    04 Dec 2021 07:01  -  05 Dec 2021 07:00  --------------------------------------------------------  IN:    IV PiggyBack: 175 mL    Oral Fluid: 960 mL  Total IN: 1135 mL    OUT:  Total OUT: 0 mL    Total NET: 1135 mL      05 Dec 2021 07:01  -  05 Dec 2021 08:05  --------------------------------------------------------  IN:    IV PiggyBack: 25 mL  Total IN: 25 mL    OUT:  Total OUT: 0 mL    Total NET: 25 mL            RADIOLOGY:

## 2021-12-05 NOTE — PROGRESS NOTE ADULT - SUBJECTIVE AND OBJECTIVE BOX
[facility] Progress Note [phone] (Dr. Hernandez, Robin, Michaelle, Ayan)    CHIEF COMPLAINT: Patient is a 85y old  Male who presents with a chief complaint of Acute appendicitis (04 Dec 2021 15:40)      Follow Up Today: The patient denies any chest discomfort or shortness of breath.    HPI:  NAVA DIAZ is an 86 YO Male brought to the ED with complaining of right lower abdominal pain for 3-4 days.   Patient states that pain is constant aching 7/10 pain. Patient also has chronic constipation, takes Miralax nightly, admits to smaller BM in the last few days. Last BM 2 days ago. Patient denies N/V/D, fever/chills, urinary symptoms, chest pain, SOB.        (30 Nov 2021 19:27)      PAST MEDICAL & SURGICAL HISTORY:  Parkinson disease    Shingles    HTN (hypertension)    Hypercholesteremia    Mild asthma    No significant past surgical history        MEDICATIONS  (STANDING):  amLODIPine   Tablet 2.5 milliGRAM(s) Oral daily  atorvastatin 10 milliGRAM(s) Oral at bedtime  calcitriol   Capsule 0.25 MICROGram(s) Oral daily  carbidopa/levodopa CR 25/100 1.5 Tablet(s) Oral <User Schedule>  gabapentin 100 milliGRAM(s) Oral three times a day  oxybutynin 5 milliGRAM(s) Oral two times a day  piperacillin/tazobactam IVPB.. 3.375 Gram(s) IV Intermittent every 8 hours  polyethylene glycol 3350 17 Gram(s) Oral daily  pramipexole 1 milliGRAM(s) Oral daily  senna 2 Tablet(s) Oral at bedtime    MEDICATIONS  (PRN):  bisacodyl 5 milliGRAM(s) Oral every 12 hours PRN Constipation  glycerin Suppository - Adult 1 Suppository(s) Rectal daily PRN Constipation  morphine  - Injectable 2 milliGRAM(s) IV Push every 6 hours PRN Severe Pain (7 - 10)  ondansetron Injectable 4 milliGRAM(s) IV Push four times a day PRN Nausea and/or Vomiting      Allergies    No Known Allergies    Intolerances        REVIEW OF SYSTEMS:    All other review of systems is negative unless indicated above    Vital Signs Last 24 Hrs  T(C): 36.6 (05 Dec 2021 05:05), Max: 36.9 (04 Dec 2021 20:04)  T(F): 97.8 (05 Dec 2021 05:05), Max: 98.5 (04 Dec 2021 20:04)  HR: 68 (05 Dec 2021 05:05) (68 - 94)  BP: 125/74 (05 Dec 2021 05:05) (125/74 - 153/95)  BP(mean): --  RR: 18 (05 Dec 2021 05:05) (17 - 18)  SpO2: 94% (05 Dec 2021 05:05) (92% - 94%)    I&O's Summary    04 Dec 2021 07:01  -  05 Dec 2021 07:00  --------------------------------------------------------  IN: 1135 mL / OUT: 0 mL / NET: 1135 mL    05 Dec 2021 07:01  -  05 Dec 2021 07:09  --------------------------------------------------------  IN: 25 mL / OUT: 0 mL / NET: 25 mL        PHYSICAL EXAM:    Constitutional: NAD, awake and alert, well-developed  Eyes:  EOMI,  Pupils round, No oral cyanosis.  HEENT: No exudate or erythema  Pulmonary: Non-labored, breath sounds are clear bilaterally, No wheezing, rales or rhonchi  Cardiovascular: Regular, S1 and S2, No murmurs, rubs, gallops oir clicks  Gastrointestinal: Bowel Sounds present, soft, nontender.   Ext: No significant LE edema with good pulses x 4  Neurological: Alert, no gross focal motor deficits  Skin: No rashes.  Psych:  Mood & affect appropriate    LABS: All Labs Reviewed:                        12.5   5.59  )-----------( 198      ( 04 Dec 2021 08:23 )             36.8                         13.5   6.38  )-----------( 210      ( 03 Dec 2021 07:23 )             38.8                         12.6   5.05  )-----------( 186      ( 02 Dec 2021 08:04 )             36.2     04 Dec 2021 08:23    143    |  115    |  16     ----------------------------<  100    3.9     |  22     |  1.60   03 Dec 2021 07:23    147    |  116    |  16     ----------------------------<  83     4.3     |  23     |  1.70   02 Dec 2021 08:04    143    |  110    |  24     ----------------------------<  95     4.1     |  26     |  1.70     Ca    8.3        04 Dec 2021 08:23  Ca    8.7        03 Dec 2021 07:23  Ca    8.3        02 Dec 2021 08:04  Phos  3.1       02 Dec 2021 08:04    TPro  6.3    /  Alb  2.5    /  TBili  0.6    /  DBili  x      /  AST  21     /  ALT  8      /  AlkPhos  43     04 Dec 2021 08:23  TPro  6.2    /  Alb  2.5    /  TBili  0.8    /  DBili  x      /  AST  11     /  ALT  7      /  AlkPhos  41     02 Dec 2021 08:04          Blood Culture:         RADIOLOGY/EKG:    Attending Attestation:   20 minutes spent on total encounter; more than 50% of the visit was spent counseling and/or coordinating care by the attending physician.     ASSESSMENT AND PLAN [facility] Cardiology Progress Note [phone] (Dr. Hernandez, Robin, Michaelle, Ayan)    CHIEF COMPLAINT: Patient is a 85y old  Male who presents with a chief complaint of Acute appendicitis (04 Dec 2021 15:40)      Follow Up Today: The patient denies shortness of breath.    HPI:  NAVA DIAZ is an 84 YO Male brought to the ED with complaining of right lower abdominal pain for 3-4 days.   Patient states that pain is constant aching 7/10 pain. Patient also has chronic constipation, takes Miralax nightly, admits to smaller BM in the last few days. Last BM 2 days ago. Patient denies N/V/D, fever/chills, urinary symptoms, chest pain, SOB.        (30 Nov 2021 19:27)      PAST MEDICAL & SURGICAL HISTORY:  Parkinson disease    Shingles    HTN (hypertension)    Hypercholesteremia    Mild asthma    No significant past surgical history        MEDICATIONS  (STANDING):  amLODIPine   Tablet 2.5 milliGRAM(s) Oral daily  atorvastatin 10 milliGRAM(s) Oral at bedtime  calcitriol   Capsule 0.25 MICROGram(s) Oral daily  carbidopa/levodopa CR 25/100 1.5 Tablet(s) Oral <User Schedule>  gabapentin 100 milliGRAM(s) Oral three times a day  oxybutynin 5 milliGRAM(s) Oral two times a day  piperacillin/tazobactam IVPB.. 3.375 Gram(s) IV Intermittent every 8 hours  polyethylene glycol 3350 17 Gram(s) Oral daily  pramipexole 1 milliGRAM(s) Oral daily  senna 2 Tablet(s) Oral at bedtime    MEDICATIONS  (PRN):  bisacodyl 5 milliGRAM(s) Oral every 12 hours PRN Constipation  glycerin Suppository - Adult 1 Suppository(s) Rectal daily PRN Constipation  morphine  - Injectable 2 milliGRAM(s) IV Push every 6 hours PRN Severe Pain (7 - 10)  ondansetron Injectable 4 milliGRAM(s) IV Push four times a day PRN Nausea and/or Vomiting      Allergies    No Known Allergies    Intolerances        REVIEW OF SYSTEMS:    All other review of systems is negative unless indicated above    Vital Signs Last 24 Hrs  T(C): 36.6 (05 Dec 2021 05:05), Max: 36.9 (04 Dec 2021 20:04)  T(F): 97.8 (05 Dec 2021 05:05), Max: 98.5 (04 Dec 2021 20:04)  HR: 68 (05 Dec 2021 05:05) (68 - 94)  BP: 125/74 (05 Dec 2021 05:05) (125/74 - 153/95)  BP(mean): --  RR: 18 (05 Dec 2021 05:05) (17 - 18)  SpO2: 94% (05 Dec 2021 05:05) (92% - 94%)    I&O's Summary    04 Dec 2021 07:01  -  05 Dec 2021 07:00  --------------------------------------------------------  IN: 1135 mL / OUT: 0 mL / NET: 1135 mL    05 Dec 2021 07:01  -  05 Dec 2021 07:09  --------------------------------------------------------  IN: 25 mL / OUT: 0 mL / NET: 25 mL        PHYSICAL EXAM:    Constitutional: NAD, awake and alert, well-developed  Eyes:  EOMI,  Pupils round, No oral cyanosis.  HEENT: No exudate or erythema  Pulmonary: Non-labored, breath sounds are clear bilaterally, No wheezing, rales or rhonchi  Cardiovascular: Regular, S1 and S2, CHRIS  Gastrointestinal: Bowel Sounds present, soft, nontender.   Ext: No significant LE edema   Neurological: Alert, no gross focal motor deficits  Skin: No rashes.  Psych:  Mood & affect appropriate    LABS: All Labs Reviewed:                        12.5   5.59  )-----------( 198      ( 04 Dec 2021 08:23 )             36.8                         13.5   6.38  )-----------( 210      ( 03 Dec 2021 07:23 )             38.8                         12.6   5.05  )-----------( 186      ( 02 Dec 2021 08:04 )             36.2     04 Dec 2021 08:23    143    |  115    |  16     ----------------------------<  100    3.9     |  22     |  1.60   03 Dec 2021 07:23    147    |  116    |  16     ----------------------------<  83     4.3     |  23     |  1.70   02 Dec 2021 08:04    143    |  110    |  24     ----------------------------<  95     4.1     |  26     |  1.70     Ca    8.3        04 Dec 2021 08:23  Ca    8.7        03 Dec 2021 07:23  Ca    8.3        02 Dec 2021 08:04  Phos  3.1       02 Dec 2021 08:04    TPro  6.3    /  Alb  2.5    /  TBili  0.6    /  DBili  x      /  AST  21     /  ALT  8      /  AlkPhos  43     04 Dec 2021 08:23  TPro  6.2    /  Alb  2.5    /  TBili  0.8    /  DBili  x      /  AST  11     /  ALT  7      /  AlkPhos  41     02 Dec 2021 08:04          Blood Culture:         RADIOLOGY/EKG:    Attending Attestation:   20 minutes spent on total encounter; more than 50% of the visit was spent counseling and/or coordinating care by the attending physician.     ASSESSMENT AND PLAN

## 2021-12-05 NOTE — PROGRESS NOTE ADULT - ASSESSMENT
84 yo male presenting with appendicitis with mass.  Currently tolerating diet, awaiting pathology report. Passing flatus and having BM. 84 yo male presenting with appendicitis with mass.  Currently tolerating diet, awaiting pathology report. Passing flatus and having BM.    Clinically looks good.  Await pathology.

## 2021-12-05 NOTE — PROGRESS NOTE ADULT - ASSESSMENT
85M w/ Parkinson's disease, HTN, HLD presents to ED c/o right sided abd pain x 3-4 days with suspicion for acute appendicitis and possible mass.  Now s/p colonoscopy. Still with RIGHT sided chest discomfort.  No short of breath, no edema and no palpitations    Suggest:  1.  Possible appendicitis s/p colonoscopy awaiting bx/pathology results  Cardiac optimized for lap Appy    2. Chest discomfort Atypical Right sided  Trop-NEGATIVE, doubt cardiac etiology  Echo unchanged with no gross wall motion abnormalities    Prior TTE 11/2019 with normal EF and mild AS.  Prior EKG with IRBBB.   Yesterday Echo shows normal LVEF with mild AS      Above d/w pt's wife and Dr Kelly   Will follow 85M w/ Parkinson's disease, HTN, HLD presents to ED c/o right sided abd pain x 3-4 days with suspicion for acute appendicitis and possible mass.  Now s/p colonoscopy. Still with RIGHT sided chest discomfort.  No short of breath, no edema and no palpitations.  Awaiting pathology and possible surgical procedure tomorrow AM    Suggest:  1.  Possible appendicitis s/p colonoscopy awaiting bx/pathology results  Cardiac optimized for Appendectomy    2. Chest discomfort Atypical Right sided  Trop-NEGATIVE, doubt cardiac etiology  Echo unchanged with no gross wall motion abnormalities    Prior TTE 11/2019 with normal EF and mild AS.  Prior EKG with IRBBB.   Yesterday Echo shows normal LVEF with mild AS      Above d/w pt's wife and Dr Kelly   Will follow

## 2021-12-06 LAB
ANION GAP SERPL CALC-SCNC: 5 MMOL/L — SIGNIFICANT CHANGE UP (ref 5–17)
BUN SERPL-MCNC: 21 MG/DL — SIGNIFICANT CHANGE UP (ref 7–23)
CALCIUM SERPL-MCNC: 9.1 MG/DL — SIGNIFICANT CHANGE UP (ref 8.5–10.1)
CHLORIDE SERPL-SCNC: 112 MMOL/L — HIGH (ref 96–108)
CO2 SERPL-SCNC: 26 MMOL/L — SIGNIFICANT CHANGE UP (ref 22–31)
CREAT SERPL-MCNC: 1.7 MG/DL — HIGH (ref 0.5–1.3)
GLUCOSE SERPL-MCNC: 93 MG/DL — SIGNIFICANT CHANGE UP (ref 70–99)
HCT VFR BLD CALC: 38.4 % — LOW (ref 39–50)
HGB BLD-MCNC: 13.5 G/DL — SIGNIFICANT CHANGE UP (ref 13–17)
MCHC RBC-ENTMCNC: 31.2 PG — SIGNIFICANT CHANGE UP (ref 27–34)
MCHC RBC-ENTMCNC: 35.2 GM/DL — SIGNIFICANT CHANGE UP (ref 32–36)
MCV RBC AUTO: 88.7 FL — SIGNIFICANT CHANGE UP (ref 80–100)
NRBC # BLD: 0 /100 WBCS — SIGNIFICANT CHANGE UP (ref 0–0)
PLATELET # BLD AUTO: 193 K/UL — SIGNIFICANT CHANGE UP (ref 150–400)
POTASSIUM SERPL-MCNC: 4.2 MMOL/L — SIGNIFICANT CHANGE UP (ref 3.5–5.3)
POTASSIUM SERPL-SCNC: 4.2 MMOL/L — SIGNIFICANT CHANGE UP (ref 3.5–5.3)
RBC # BLD: 4.33 M/UL — SIGNIFICANT CHANGE UP (ref 4.2–5.8)
RBC # FLD: 12.6 % — SIGNIFICANT CHANGE UP (ref 10.3–14.5)
SODIUM SERPL-SCNC: 143 MMOL/L — SIGNIFICANT CHANGE UP (ref 135–145)
WBC # BLD: 7.11 K/UL — SIGNIFICANT CHANGE UP (ref 3.8–10.5)
WBC # FLD AUTO: 7.11 K/UL — SIGNIFICANT CHANGE UP (ref 3.8–10.5)

## 2021-12-06 PROCEDURE — 99232 SBSQ HOSP IP/OBS MODERATE 35: CPT

## 2021-12-06 RX ORDER — SODIUM CHLORIDE 9 MG/ML
1000 INJECTION, SOLUTION INTRAVENOUS
Refills: 0 | Status: DISCONTINUED | OUTPATIENT
Start: 2021-12-06 | End: 2021-12-07

## 2021-12-06 RX ORDER — HEPARIN SODIUM 5000 [USP'U]/ML
5000 INJECTION INTRAVENOUS; SUBCUTANEOUS EVERY 12 HOURS
Refills: 0 | Status: DISCONTINUED | OUTPATIENT
Start: 2021-12-06 | End: 2021-12-07

## 2021-12-06 RX ADMIN — CALCITRIOL 0.25 MICROGRAM(S): 0.5 CAPSULE ORAL at 11:42

## 2021-12-06 RX ADMIN — PIPERACILLIN AND TAZOBACTAM 25 GRAM(S): 4; .5 INJECTION, POWDER, LYOPHILIZED, FOR SOLUTION INTRAVENOUS at 05:31

## 2021-12-06 RX ADMIN — PIPERACILLIN AND TAZOBACTAM 25 GRAM(S): 4; .5 INJECTION, POWDER, LYOPHILIZED, FOR SOLUTION INTRAVENOUS at 21:24

## 2021-12-06 RX ADMIN — GABAPENTIN 100 MILLIGRAM(S): 400 CAPSULE ORAL at 14:01

## 2021-12-06 RX ADMIN — CARBIDOPA AND LEVODOPA 1.5 TABLET(S): 25; 100 TABLET ORAL at 14:01

## 2021-12-06 RX ADMIN — GABAPENTIN 100 MILLIGRAM(S): 400 CAPSULE ORAL at 21:24

## 2021-12-06 RX ADMIN — GABAPENTIN 100 MILLIGRAM(S): 400 CAPSULE ORAL at 05:31

## 2021-12-06 RX ADMIN — CARBIDOPA AND LEVODOPA 1.5 TABLET(S): 25; 100 TABLET ORAL at 05:31

## 2021-12-06 RX ADMIN — AMLODIPINE BESYLATE 2.5 MILLIGRAM(S): 2.5 TABLET ORAL at 05:31

## 2021-12-06 RX ADMIN — CARBIDOPA AND LEVODOPA 1.5 TABLET(S): 25; 100 TABLET ORAL at 21:25

## 2021-12-06 RX ADMIN — ATORVASTATIN CALCIUM 10 MILLIGRAM(S): 80 TABLET, FILM COATED ORAL at 21:24

## 2021-12-06 RX ADMIN — PRAMIPEXOLE DIHYDROCHLORIDE 1 MILLIGRAM(S): 0.12 TABLET ORAL at 11:42

## 2021-12-06 RX ADMIN — PIPERACILLIN AND TAZOBACTAM 25 GRAM(S): 4; .5 INJECTION, POWDER, LYOPHILIZED, FOR SOLUTION INTRAVENOUS at 14:01

## 2021-12-06 RX ADMIN — HEPARIN SODIUM 5000 UNIT(S): 5000 INJECTION INTRAVENOUS; SUBCUTANEOUS at 17:49

## 2021-12-06 RX ADMIN — SENNA PLUS 2 TABLET(S): 8.6 TABLET ORAL at 21:24

## 2021-12-06 NOTE — OCCUPATIONAL THERAPY INITIAL EVALUATION ADULT - NS ASR FOLLOW COMMAND OT EVAL
1-2 step directions/75% of the time 1-2 step directions with cueing due to [place]/100% of the time

## 2021-12-06 NOTE — OCCUPATIONAL THERAPY INITIAL EVALUATION ADULT - FINE MOTOR COORDINATION EXAM
mild tremors noted when handling task objects/Left UE/Right UE mild tremors noted when handling feeding utensils/Left UE/Right UE

## 2021-12-06 NOTE — OCCUPATIONAL THERAPY INITIAL EVALUATION ADULT - GENERAL OBSERVATIONS, REHAB EVAL
Pt received supine in bed. Pt was in agreement and tolerated 30 minute OT session well. Pt is [facility].

## 2021-12-06 NOTE — PROGRESS NOTE ADULT - ATTENDING COMMENTS
Case discussed with PA this morning.  Findings as per above.  Seen by me independently this evening as well.  Pathology reviewed.  No evidence of malignancy.  As such, since patient remains asymptomatic (RLQ pain completely resolved) and hemodynamically stable with normal blood work, I will defer surgical intervention acutely in favor of an interval appendectomy in several weeks once acute inflammatory process resolves.  Recommend continued supportive care, may need PT/Rehab as per Medical attending.  Discussed with Dr Torres.  Discussed with the patient as well as his wife.    Begin discharge planning.  One week of oral antibiotics upon discharge.  F/u with me as out-patient for interval imaging and plans for outpatient appendectomy.
Pt seen and examined.  Resting comfortably in bed this evening.    RLQ pain continues to improve.  CT suspicious for Cecal mass.  Awaiting colonoscopy.  GI following and pt to be bowel prepped for Friday pending cardiology clearance.  Need for surgical intervention pending Colonoscopic findings.  CEA 3  WIll continue to follow.
Pt seen and examined.  Resting comfortably in bed this evening.    RLQ pain continues to improve.  CT suspicious for Cecal mass.  Awaiting colonoscopy.  GI following and pt to be bowel prepped for Friday pending cardiology clearance.  Need for surgical intervention pending Colonoscopic findings.  CEA 3  WIll continue to follow.
I was present at time of colonoscopy performed by Dr Ballard.  No obvious cecal lesion although base of appendix appears quite indurated with purulent appearing mucoid secretions.  Several polyps identified throughout the colon and biopsied.  Please refer to Dr Horton operative report.    Given the duration of symptoms and clinical improvement, will continue on IV antibiotics and await Biopsy results.    Surgical intervention to be dictated by pathology, ie Simple appendectomy (+/- partial Cecectomy vs right hemicolectomy).  May advance diet as tolerated.  Will continue to follow.
Pt seen this morning.  States he's feeling better with decreased abdominal pain.  Constipated (chronic).    Vitals stable.  Blood work relatively unchanged.  Tumor markers pending.  GI consultation appreciated.  Will need colonoscopy.  Should get bowel prep.  Further surgical recommendations pending colonoscopy findings.  Will continue to follow closely.

## 2021-12-06 NOTE — PROGRESS NOTE ADULT - SUBJECTIVE AND OBJECTIVE BOX
patient seen, no overnight event, no complaints at this time , patient having bkfast with no report of abdominal pain or N/V.           T(F): 98.2 (12-06-21 @ 04:49), Max: 98.3 (12-05-21 @ 13:05)  HR: 61 (12-06-21 @ 04:49) (60 - 68)  BP: 135/80 (12-06-21 @ 04:49) (129/80 - 135/80)  RR: 16 (12-06-21 @ 04:49) (16 - 18)  SpO2: 94% (12-06-21 @ 04:49) (91% - 94%)  Wt(kg): --  CAPILLARY BLOOD GLUCOSE          PHYSICAL EXAM:  General: NAD, WDWN.   Neuro:  Alert & oriented x 3  CV: +S1+S2 regular rate and rhythm  Lung: clear to ausculation bilaterally, respirations nonlabored, good inspiratory effort  Abdomen: soft, NTND. Normactive BS  Extremities: no pedal edema or calf tenderness noted       LABS:                        13.5   7.11  )-----------( 193      ( 06 Dec 2021 07:37 )             38.4     12-06    143  |  112<H>  |  21  ----------------------------<  93  4.2   |  26  |  1.70<H>    Ca    9.1      06 Dec 2021 07:37        I&O's Detail    05 Dec 2021 07:01  -  06 Dec 2021 07:00  --------------------------------------------------------  IN:    IV PiggyBack: 125 mL    sodium chloride 0.45%: 300 mL  Total IN: 425 mL    OUT:  Total OUT: 0 mL    Total NET: 425 mL          Impression: 85y Male admitted with Acute appendicitis      PMH Parkinson disease    Shingles    HTN (hypertension)    Hypercholesteremia    Mild asthma        Plan:  patient remains asymptomatic at this time   -awaiting path report most likely will be resulted today as per pathology  -will follow

## 2021-12-06 NOTE — PROGRESS NOTE ADULT - SUBJECTIVE AND OBJECTIVE BOX
[facility] Progress Note [phone] (Dr. Hernandez, Robin, Michaelle, Ayan)    CHIEF COMPLAINT: Patient is a 85y old  Male who presents with a chief complaint of Acute appendicitis (05 Dec 2021 22:24)      Follow Up Today: The patient denies any chest discomfort or shortness of breath.    HPI:  NAVA DIAZ is an 84 YO Male brought to the ED with complaining of right lower abdominal pain for 3-4 days.   Patient states that pain is constant aching 7/10 pain. Patient also has chronic constipation, takes Miralax nightly, admits to smaller BM in the last few days. Last BM 2 days ago. Patient denies N/V/D, fever/chills, urinary symptoms, chest pain, SOB.        (30 Nov 2021 19:27)      PAST MEDICAL & SURGICAL HISTORY:  Parkinson disease    Shingles    HTN (hypertension)    Hypercholesteremia    Mild asthma    No significant past surgical history        MEDICATIONS  (STANDING):  amLODIPine   Tablet 2.5 milliGRAM(s) Oral daily  atorvastatin 10 milliGRAM(s) Oral at bedtime  calcitriol   Capsule 0.25 MICROGram(s) Oral daily  carbidopa/levodopa CR 25/100 1.5 Tablet(s) Oral <User Schedule>  gabapentin 100 milliGRAM(s) Oral three times a day  piperacillin/tazobactam IVPB.. 3.375 Gram(s) IV Intermittent every 8 hours  polyethylene glycol 3350 17 Gram(s) Oral daily  pramipexole 1 milliGRAM(s) Oral daily  senna 2 Tablet(s) Oral at bedtime  sodium chloride 0.45%. 1000 milliLiter(s) (50 mL/Hr) IV Continuous <Continuous>    MEDICATIONS  (PRN):  bisacodyl 5 milliGRAM(s) Oral every 12 hours PRN Constipation  glycerin Suppository - Adult 1 Suppository(s) Rectal daily PRN Constipation  morphine  - Injectable 2 milliGRAM(s) IV Push every 6 hours PRN Severe Pain (7 - 10)  ondansetron Injectable 4 milliGRAM(s) IV Push four times a day PRN Nausea and/or Vomiting      Allergies    No Known Allergies    Intolerances        REVIEW OF SYSTEMS:    All other review of systems is negative unless indicated above    Vital Signs Last 24 Hrs  T(C): 36.8 (06 Dec 2021 04:49), Max: 36.8 (05 Dec 2021 13:05)  T(F): 98.2 (06 Dec 2021 04:49), Max: 98.3 (05 Dec 2021 13:05)  HR: 61 (06 Dec 2021 04:49) (60 - 68)  BP: 135/80 (06 Dec 2021 04:49) (129/80 - 135/80)  BP(mean): --  RR: 16 (06 Dec 2021 04:49) (16 - 18)  SpO2: 94% (06 Dec 2021 04:49) (91% - 94%)    I&O's Summary    05 Dec 2021 07:01  -  06 Dec 2021 07:00  --------------------------------------------------------  IN: 425 mL / OUT: 0 mL / NET: 425 mL        PHYSICAL EXAM:    Constitutional: NAD, awake and alert, well-developed  Eyes:  EOMI,  Pupils round, No oral cyanosis.  HEENT: No exudate or erythema  Pulmonary: Non-labored, breath sounds are clear bilaterally, No wheezing, rales or rhonchi  Cardiovascular: Regular, S1 and S2, CHRIS  Gastrointestinal: Bowel Sounds present, soft, nontender.   Ext: No significant LE edema   Neurological: Alert, no gross focal motor deficits  Skin: No rashes.  Psych:  Mood & affect appropriate    LABS: All Labs Reviewed:                        13.5   7.11  )-----------( 193      ( 06 Dec 2021 07:37 )             38.4                         12.5   5.59  )-----------( 198      ( 04 Dec 2021 08:23 )             36.8     06 Dec 2021 07:37    143    |  112    |  21     ----------------------------<  93     4.2     |  26     |  1.70   05 Dec 2021 07:29    144    |  112    |  22     ----------------------------<  95     4.2     |  28     |  2.00   04 Dec 2021 08:23    143    |  115    |  16     ----------------------------<  100    3.9     |  22     |  1.60     Ca    9.1        06 Dec 2021 07:37  Ca    8.9        05 Dec 2021 07:29  Ca    8.3        04 Dec 2021 08:23    TPro  6.3    /  Alb  2.5    /  TBili  0.6    /  DBili  x      /  AST  21     /  ALT  8      /  AlkPhos  43     04 Dec 2021 08:23          Blood Culture:         RADIOLOGY/EKG:    Attending Attestation:   20 minutes spent on total encounter; more than 50% of the visit was spent counseling and/or coordinating care by the attending physician.     ASSESSMENT AND PLAN

## 2021-12-06 NOTE — PROGRESS NOTE ADULT - ASSESSMENT
85M w/ Parkinson's disease, HTN, HLD presents to ED c/o right sided abd pain x 3-4 days with suspicion for acute appendicitis and possible mass.  Now s/p colonoscopy. Still with RIGHT sided chest discomfort.  No short of breath, no edema and no palpitations.  Awaiting pathology and possible surgical procedure    Suggest:  1.  Possible appendicitis s/p colonoscopy awaiting bx/pathology results  Cardiac optimized for Appendectomy    2. Chest discomfort Atypical Right sided  Trop-NEGATIVE, doubt cardiac etiology  Echo unchanged with no gross wall motion abnormalities    Prior TTE 11/2019 with normal EF and mild AS.  Prior EKG with IRBBB.   Yesterday Echo shows normal LVEF with mild AS      Will follow

## 2021-12-06 NOTE — OCCUPATIONAL THERAPY INITIAL EVALUATION ADULT - ADL RETRAINING, OT EVAL
Pt will dress upper body with Min Ax1 within 2-5 sessions. Pt will dress lower body with Min Ax1 with DME/assistive equipment as needed within 2-5 sessions. Pt will perform grooming while standing at sink with supervision and DME as needed within 2-5 sessions.

## 2021-12-06 NOTE — PROGRESS NOTE ADULT - PROBLEM SELECTOR PROBLEM 1
Acute appendicitis
RLQ abdominal pain
Acute appendicitis
Acute appendicitis
RLQ abdominal pain
Acute appendicitis
RLQ abdominal pain
Acute appendicitis

## 2021-12-06 NOTE — OCCUPATIONAL THERAPY INITIAL EVALUATION ADULT - MANUAL MUSCLE TESTING RESULTS, REHAB EVAL
grossly assessed through ADLs, at least 3+/5 bilateral UEs grossly assessed through ADLs, at least 3+/5 bilateral UEs/no strength deficits were identified

## 2021-12-06 NOTE — PROGRESS NOTE ADULT - PROBLEM SELECTOR PLAN 1
- F/up AM labs. CEA level ordered  - Colonoscopy with GI today. Will f/up findings  - F/up heme/onc recs  - Cont IV Zosyn  - NPO, IVF  - pain control, supportive care, OOB, incentive spirometer  - Day team to discuss case with Dr. Garcia    Surgical Team Contact Information  Spectralink: Ext: [phone] or [phone]  Pager: [phone]
Surgical consult with Dr. santos appreciated  diet per surgery   surgery per dr. mcconnell on coming monday  IV Antibiotics - zosyn per ID  There are no absolute medical contraindications to colonoscopy and surgical intervention
Surgical consult,   NPO,   IV Fluid,   IV Antibiotics  There are no absolute medical contraindications to colonoscopy and surgical intervention pending cardiac clearance
Surgical consult with Dr. santos  diet per surgery   surgery per dr. mcconnell   IV Antibiotics  There are no absolute medical contraindications to colonoscopy and surgical intervention
Continue diet,   OOB, ambulation  Awaiting pathology reports, OR this admission  Continue ABX- currently on Zosyn  Will discuss with Dr. Metzger (Covering for Dr. Garcia)
Surgical consult with Dr. santos appreciated  diet per surgery   surgery per dr. mcconnell on coming monday  IV Antibiotics  There are no absolute medical contraindications to colonoscopy and surgical intervention
Awaiting final report from GI, then awaiting pathology for biopsies  Continue Zosyn  Cea is normal  Will continue to follow  Discussed with Dr. Garcia, plan for OR on this admission
Surgical consult, NPO, IV Fluid, IV Antibiotics  There are no absolute medical contraindications to colonoscopy and surgical intervention pending cardiac clearance
with mass  - Continue NPO, IV fluids  - Continue zosyn  - Colonoscopy with GI planned for 12/3 if cleared by cardio  - Will endorse to day team to discuss with Dr. Garcia
Surgical consult with Dr. santos appreciated  diet per surgery   surgery per dr. mcconnell on coming monday  IV Antibiotics  There are no absolute medical contraindications to colonoscopy and surgical intervention

## 2021-12-06 NOTE — PROGRESS NOTE ADULT - SUBJECTIVE AND OBJECTIVE BOX
[facility]  Dakota Díaz PA-C  [facility] [place] [address]  [phone]      INTERVAL HPI/OVERNIGHT EVENTS:  Pt s/e  Pt states he has no GI complaints today    MEDICATIONS  (STANDING):  amLODIPine   Tablet 2.5 milliGRAM(s) Oral daily  atorvastatin 10 milliGRAM(s) Oral at bedtime  calcitriol   Capsule 0.25 MICROGram(s) Oral daily  carbidopa/levodopa CR 25/100 1.5 Tablet(s) Oral <User Schedule>  gabapentin 100 milliGRAM(s) Oral three times a day  heparin   Injectable 5000 Unit(s) SubCutaneous every 12 hours  piperacillin/tazobactam IVPB.. 3.375 Gram(s) IV Intermittent every 8 hours  pramipexole 1 milliGRAM(s) Oral daily  senna 2 Tablet(s) Oral at bedtime  sodium chloride 0.45%. 1000 milliLiter(s) (50 mL/Hr) IV Continuous <Continuous>    MEDICATIONS  (PRN):  bisacodyl 5 milliGRAM(s) Oral every 12 hours PRN Constipation  glycerin Suppository - Adult 1 Suppository(s) Rectal daily PRN Constipation  morphine  - Injectable 2 milliGRAM(s) IV Push every 6 hours PRN Severe Pain (7 - 10)  ondansetron Injectable 4 milliGRAM(s) IV Push four times a day PRN Nausea and/or Vomiting      Allergies    No Known Allergies    PHYSICAL EXAM:   Vital Signs:  Vital Signs Last 24 Hrs  T(C): 36.8 (06 Dec 2021 04:49), Max: 36.8 (05 Dec 2021 13:05)  T(F): 98.2 (06 Dec 2021 04:49), Max: 98.3 (05 Dec 2021 13:05)  HR: 61 (06 Dec 2021 04:49) (60 - 68)  BP: 135/80 (06 Dec 2021 04:49) (129/80 - 135/80)  BP(mean): --  RR: 16 (06 Dec 2021 04:49) (16 - 18)  SpO2: 94% (06 Dec 2021 04:49) (91% - 94%)  Daily     Daily     GENERAL:  Appears stated age,   HEENT:  NC/AT,    CHEST:  Full & symmetric excursion,   HEART:  Regular rhythm,  ABDOMEN:  Soft, non-tender, non-distended,  EXTEREMITIES:  no cyanosis  SKIN:  No rash  NEURO:  Alert,       LABS:                        13.5   7.11  )-----------( 193      ( 06 Dec 2021 07:37 )             38.4     12-06    143  |  112<H>  |  21  ----------------------------<  93  4.2   |  26  |  1.70<H>    Ca    9.1      06 Dec 2021 07:37            RADIOLOGY & ADDITIONAL TESTS:

## 2021-12-06 NOTE — PROGRESS NOTE ADULT - ASSESSMENT
Prerenal azotemia, Likely CKD 3  Hypertension  Appendicitis    Stable renal indices. To continue current meds. Change IVF to D5W. Monitor BP trend. Titrate BP meds as needed.   Surgery / GI follow up. Will follow electrolytes and renal function trend.

## 2021-12-06 NOTE — PROGRESS NOTE ADULT - PROBLEM SELECTOR PLAN 3
continue Norvasc  stable
continue Norvasc  stable
Norvasc
Norvasc  stable
continue Norvasc  stable
continue Norvasc  stable  cardio follow up

## 2021-12-06 NOTE — OCCUPATIONAL THERAPY INITIAL EVALUATION ADULT - BED MOBILITY LIMITATIONS, REHAB EVAL
decreased ability to use arms for pushing/pulling/decreased ability to use legs for bridging/pushing/impaired ability to control trunk for mobility decreased ability to use arms for pushing/pulling/decreased ability to use legs for bridging/pushing

## 2021-12-06 NOTE — PROGRESS NOTE ADULT - PROBLEM SELECTOR PROBLEM 2
PD (Parkinson's disease)

## 2021-12-06 NOTE — CONSULT NOTE ADULT - SUBJECTIVE AND OBJECTIVE BOX
Patient is a 85y Male whom presented to the hospital with     PAST MEDICAL & SURGICAL HISTORY:  Parkinson disease    Shingles    HTN (hypertension)    Hypercholesteremia    Mild asthma    No significant past surgical history        MEDICATIONS  (STANDING):  amLODIPine   Tablet 2.5 milliGRAM(s) Oral daily  atorvastatin 10 milliGRAM(s) Oral at bedtime  calcitriol   Capsule 0.25 MICROGram(s) Oral daily  carbidopa/levodopa CR 25/100 1.5 Tablet(s) Oral <User Schedule>  dextrose 5%. 1000 milliLiter(s) (75 mL/Hr) IV Continuous <Continuous>  gabapentin 100 milliGRAM(s) Oral three times a day  heparin   Injectable 5000 Unit(s) SubCutaneous every 12 hours  piperacillin/tazobactam IVPB.. 3.375 Gram(s) IV Intermittent every 8 hours  pramipexole 1 milliGRAM(s) Oral daily  senna 2 Tablet(s) Oral at bedtime      Allergies    No Known Allergies    Intolerances        SOCIAL HISTORY:  Denies ETOh,Smoking,     FAMILY HISTORY:  No pertinent family history in first degree relatives        REVIEW OF SYSTEMS:    CONSTITUTIONAL: No weakness, fevers or chills  RESPIRATORY: No cough, wheezing, hemoptysis; No shortness of breath  CARDIOVASCULAR: No chest pain or palpitations  GASTROINTESTINAL: No abdominal or epigastric pain. No nausea, vomiting,     No diarrhea or constipation. No melena   GENITOURINARY: No dysuria, frequency or hematuria      VITAL:  T(C): , Max: 36.8 (12-06-21 @ 04:49)  T(F): , Max: 98.2 (12-06-21 @ 04:49)  HR: 58 (12-06-21 @ 13:15)  BP: 117/77 (12-06-21 @ 13:15)  BP(mean): --  RR: 17 (12-06-21 @ 13:15)  SpO2: 93% (12-06-21 @ 13:15)  Wt(kg): --    I and O's:    12-05 @ 07:01  -  12-06 @ 07:00  --------------------------------------------------------  IN: 425 mL / OUT: 0 mL / NET: 425 mL    12-06 @ 07:01  -  12-06 @ 18:53  --------------------------------------------------------  IN: 600 mL / OUT: 0 mL / NET: 600 mL          PHYSICAL EXAM:    Constitutional: NAD  HEENT: conjunctive   clear   Neck:  No JVD  Respiratory: CTAB  Cardiovascular: S1 and S2  Gastrointestinal: BS+, soft,   Extremities: No peripheral edema    LABS:                        13.5   7.11  )-----------( 193      ( 06 Dec 2021 07:37 )             38.4     12-06    143  |  112<H>  |  21  ----------------------------<  93  4.2   |  26  |  1.70<H>    Ca    9.1      06 Dec 2021 07:37        Urine Studies:          RADIOLOGY & ADDITIONAL STUDIES:

## 2021-12-06 NOTE — OCCUPATIONAL THERAPY INITIAL EVALUATION ADULT - PHYSICAL ASSIST/NONPHYSICAL ASSIST: STAND/SIT, REHAB EVAL
verbal cues/nonverbal cues (demo/gestures)/1 person assist supervision/verbal cues/nonverbal cues (demo/gestures)/1 person assist

## 2021-12-06 NOTE — PROGRESS NOTE ADULT - SUBJECTIVE AND OBJECTIVE BOX
[facility]  INFECTIOUS DISEASES   [address] [place] [address]  Tel: [phone]     Fax: [phone]  ======================================================  MD Bubba Gallegos Kaushal, MD Cho, Michelle, MD   ======================================================    [id number]  NAVA VITGERARDBUSHRAILIANA     Follow up: Acute appendicitis ?    No abdominal pain, on regular diet tolerating well.   No fever. Colonoscopy with some polyps but no mass in cecum, biopsy done.     PAST MEDICAL & SURGICAL HISTORY:  Parkinson disease  Shingles  HTN (hypertension)  Hypercholesteremia  Mild asthma  No significant past surgical history    Social Hx: no smoking, ETOH or drugs     FAMILY HISTORY:  No pertinent family history in first degree relatives    Allergies  No Known Allergies    Antibiotics:  piperacillin/tazobactam IVPB.. 3.375 Gram(s) IV Intermittent every 8 hours    REVIEW OF SYSTEMS:  CONSTITUTIONAL:  No Fever or chills  HEENT:  No diplopia or blurred vision.  No sore throat or runny nose.  CARDIOVASCULAR:  No chest pain or SOB.  RESPIRATORY:  No cough, shortness of breath, PND or orthopnea.  GASTROINTESTINAL:  No nausea, vomiting or diarrhea.  GENITOURINARY:  No dysuria, frequency or urgency. No Blood in urine  MUSCULOSKELETAL:  no joint aches, no muscle pain  SKIN:  No change in skin, hair or nails.  NEUROLOGIC:  No paresthesias, fasciculations, seizures or weakness.  PSYCHIATRIC:  No disorder of thought or mood.  ENDOCRINE:  No heat or cold intolerance, polyuria or polydipsia.  HEMATOLOGICAL:  No easy bruising or bleeding.     Physical Exam:  Vital Signs Last 24 Hrs  T(C): 36.8 (06 Dec 2021 04:49), Max: 36.8 (05 Dec 2021 13:05)  T(F): 98.2 (06 Dec 2021 04:49), Max: 98.3 (05 Dec 2021 13:05)  HR: 67 (06 Dec 2021 12:20) (60 - 68)  BP: 133/82 (06 Dec 2021 12:20) (129/80 - 135/80)  BP(mean): --  RR: 16 (06 Dec 2021 04:49) (16 - 18)  SpO2: 94% (06 Dec 2021 12:20) (91% - 94%)  GEN: NAD  HEENT: normocephalic and atraumatic. EOMI. PERRL.    NECK: Supple.  No lymphadenopathy   LUNGS: Clear to auscultation.  HEART: Regular rate and rhythm without murmur.  ABDOMEN: Soft, nontender, and nondistended.  Positive bowel sounds.    : No CVA tenderness  EXTREMITIES: Without any cyanosis, clubbing, rash, lesions or edema.  NEUROLOGIC: grossly intact.  PSYCHIATRIC: Appropriate affect .  SKIN: No ulceration or induration present.    Labs:                        13.5   7.11  )-----------( 193      ( 06 Dec 2021 07:37 )             38.4     12-06    143  |  112<H>  |  21  ----------------------------<  93  4.2   |  26  |  1.70<H>    Ca    9.1      06 Dec 2021 07:37    WBC Count: 7.11 K/uL (12-06-21 @ 07:37)  WBC Count: 5.59 K/uL (12-04-21 @ 08:23)  WBC Count: 6.38 K/uL (12-03-21 @ 07:23)  WBC Count: 5.05 K/uL (12-02-21 @ 08:04)    Creatinine, Serum: 1.70 mg/dL (12-06-21 @ 07:37)  Creatinine, Serum: 2.00 mg/dL (12-05-21 @ 07:29)  Creatinine, Serum: 1.60 mg/dL (12-04-21 @ 08:23)  Creatinine, Serum: 1.70 mg/dL (12-03-21 @ 07:23)  Creatinine, Serum: 1.70 mg/dL (12-02-21 @ 08:04)    COVID-19 PCR: NotDetec (12-02-21 @ 07:44)  COVID-19 PCR: NotDetec (11-30-21 @ 13:09)    All imaging and other data have been reviewed.  < from: CT Abdomen and Pelvis w/ Oral Cont (11.30.21 @ 15:59) >  IMPRESSION:  Acute nonperforated appendicitis with soft tissue mass at the base of the appendix measuring approximately 3.4 cm in diameter. Findings are concerning for malignancy.    Assessment and Plan:   86 yo man with PMH of HTN, asthma and parkinson's disease was admitted with right lower abdominal pain for 3-4 days.   CT was done showing possible acute appendicitis with no perforation but also malignancy was a concern.     S/P Colonoscopy on 12/03.     Acute appendicitis:  - GI and surgery follow up noted, will follow pathology result  - Continue zosyn 3.375gm q8h to cover GI alysia  - Based on pathology report will decide about the antibiotic regimen     Will follow.    Suzanne Miller MD  Division of Infectious Diseases   Cell [phone] between 8am and 6pm   After 6pm and weekends please call ID service at [phone].     30 minutes spent on total encounter assessing patient, examination, chart reivew, counseling and coordinating care by the attending physician/nurse/care manager.

## 2021-12-06 NOTE — CONSULT NOTE ADULT - ASSESSMENT
NAVA DIAZ is an 84 YO Male brought to the ED with complaining of right lower abdominal pain for 3-4 days.   Patient states that pain is constant aching 7/10 pain. Patient also has chronic constipation, takes Miralax nightly, admits to smaller BM in the last few days. Last BM 2 days ago. Patient denies N/V/D, fever/chills, urinary symptoms, chest pain, SOB.       1- CHRONIC KIDNEY DISEASE, STAGE 3: cr base line is 1.6 -1.7   Serum creatinine is stable at 1.7 , approximating a GFR is controlled   There is no progression.  No uremic symptoms. No evidence of  worsening  Anemia. Fluid status stable.   Will continue to avoid nephrotoxic drugs.  Patient remains asymptomatic.  Continue current therapy.    2- ANEMIA PLAN:  Anemia of chronic disease:    We will consider  Aranesp aiming for a HCT of 32-36 %.   We will monitor Iron stores, B12 and RBC folate .    3- BP monitoring,continue current antihypertensive meds, low salt diet,followup with PMD in 1-2 weeks

## 2021-12-06 NOTE — PROGRESS NOTE ADULT - SUBJECTIVE AND OBJECTIVE BOX
Patient is a 85y old  Male who presents with a chief complaint of Acute appendicitis (06 Dec 2021 12:40)      INTERVAL /OVERNIGHT EVENTS: no further abd pain    MEDICATIONS  (STANDING):  amLODIPine   Tablet 2.5 milliGRAM(s) Oral daily  atorvastatin 10 milliGRAM(s) Oral at bedtime  calcitriol   Capsule 0.25 MICROGram(s) Oral daily  carbidopa/levodopa CR 25/100 1.5 Tablet(s) Oral <User Schedule>  dextrose 5%. 1000 milliLiter(s) (75 mL/Hr) IV Continuous <Continuous>  gabapentin 100 milliGRAM(s) Oral three times a day  heparin   Injectable 5000 Unit(s) SubCutaneous every 12 hours  piperacillin/tazobactam IVPB.. 3.375 Gram(s) IV Intermittent every 8 hours  pramipexole 1 milliGRAM(s) Oral daily  senna 2 Tablet(s) Oral at bedtime    MEDICATIONS  (PRN):  bisacodyl 5 milliGRAM(s) Oral every 12 hours PRN Constipation  glycerin Suppository - Adult 1 Suppository(s) Rectal daily PRN Constipation  morphine  - Injectable 2 milliGRAM(s) IV Push every 6 hours PRN Severe Pain (7 - 10)  ondansetron Injectable 4 milliGRAM(s) IV Push four times a day PRN Nausea and/or Vomiting      Allergies    No Known Allergies    Intolerances        REVIEW OF SYSTEMS:  CONSTITUTIONAL: No fever, weight loss, or fatigue  EYES: No eye pain, visual disturbances, or discharge  ENMT:  No difficulty hearing, tinnitus, vertigo; No sinus or throat pain  NECK: No pain or stiffness  RESPIRATORY: No cough, wheezing, chills or hemoptysis; No shortness of breath  CARDIOVASCULAR: No chest pain, palpitations, dizziness, or leg swelling  GASTROINTESTINAL: No abdominal or epigastric pain. No nausea, vomiting, or hematemesis; No diarrhea or constipation. No melena or hematochezia.  GENITOURINARY: No dysuria, frequency, hematuria, or incontinence  NEUROLOGICAL: No headaches, memory loss, loss of strength, numbness, or tremors  SKIN: No itching, burning, rashes, or lesions   LYMPH NODES: No enlarged glands  ENDOCRINE: No heat or cold intolerance; No hair loss; No polydipsia or polyuria  MUSCULOSKELETAL: No joint pain or swelling; No muscle, back, or extremity pain  PSYCHIATRIC: No depression, anxiety, mood swings, or difficulty sleeping  HEME/LYMPH: No easy bruising, or bleeding gums  ALLERGY AND IMMUNOLOGIC: No hives or eczema    Vital Signs Last 24 Hrs  T(C): 36.6 (06 Dec 2021 13:15), Max: 36.8 (06 Dec 2021 04:49)  T(F): 97.9 (06 Dec 2021 13:15), Max: 98.2 (06 Dec 2021 04:49)  HR: 58 (06 Dec 2021 13:15) (58 - 68)  BP: 117/77 (06 Dec 2021 13:15) (117/77 - 135/80)  BP(mean): --  RR: 17 (06 Dec 2021 13:15) (16 - 17)  SpO2: 93% (06 Dec 2021 13:15) (91% - 94%)    PHYSICAL EXAM:  GENERAL: NAD, well-groomed, well-developed  HEAD:  Atraumatic, Normocephalic  EYES: EOMI, PERRLA, conjunctiva and sclera clear  ENMT: No tonsillar erythema, exudates, or enlargement; Moist mucous membranes, Good dentition, No lesions  NECK: Supple, No JVD, Normal thyroid  NERVOUS SYSTEM:  Alert & Oriented X3, Good concentration; Motor Strength 5/5 B/L upper and lower extremities; DTRs 2+ intact and symmetric  CHEST/LUNG: Clear to auscultation bilaterally; No rales, rhonchi, wheezing, or rubs  HEART: Regular rate and rhythm; No murmurs, rubs, or gallops  ABDOMEN: Soft, Nontender, Nondistended; Bowel sounds present  EXTREMITIES:  2+ Peripheral Pulses, No clubbing, cyanosis, or edema  LYMPH: No lymphadenopathy noted  SKIN: No rashes or lesions    LABS:                        13.5   7.11  )-----------( 193      ( 06 Dec 2021 07:37 )             38.4     06 Dec 2021 07:37    143    |  112    |  21     ----------------------------<  93     4.2     |  26     |  1.70     Ca    9.1        06 Dec 2021 07:37          CAPILLARY BLOOD GLUCOSE          RADIOLOGY & ADDITIONAL TESTS:    Notes Reviewed:  [x ] YES  [ ] NO    Care Discussed with Consultants/Other Providers [x ] YES  [ ] NO

## 2021-12-06 NOTE — PROGRESS NOTE ADULT - PROBLEM SELECTOR PLAN 2
Sinemet
Sinemet  neuro eval if necessary  PT eval  OT eval
Sinemet  neuro eval if necessary  PT eval  OT eval  needs ROBERTH
Sinemet  neuro eval if necessary
Sinemet  neuro eval if necessary  PT eval
Sinemet  neuro eval if necessary  PT eval  OT eval

## 2021-12-06 NOTE — OCCUPATIONAL THERAPY INITIAL EVALUATION ADULT - PERTINENT HX OF CURRENT PROBLEM, REHAB EVAL
Pt is an 84 y/o M presenting with R lower abdominal pain x3-4 days, found to have acute appendicitis vs colonic mass. Pt s/p colonscopy 12/3/21. Pt is an 86 y/o male presenting with R lower abdominal pain x3-4 days, found to have acute appendicitis vs colonic mass. Pt s/p colonscopy 12/3/21.

## 2021-12-06 NOTE — PROGRESS NOTE ADULT - SUBJECTIVE AND OBJECTIVE BOX
Patient is a 85y old  Male who presents with a chief complaint of Acute appendicitis (02 Dec 2021 12:06)    Patient seen in follow up for CKD.        PAST MEDICAL HISTORY:  Parkinson disease    Shingles    HTN (hypertension)    Hypercholesteremia    Mild asthma      MEDICATIONS  (STANDING):  amLODIPine   Tablet 2.5 milliGRAM(s) Oral daily  atorvastatin 10 milliGRAM(s) Oral at bedtime  calcitriol   Capsule 0.25 MICROGram(s) Oral daily  carbidopa/levodopa CR 25/100 1.5 Tablet(s) Oral <User Schedule>  gabapentin 100 milliGRAM(s) Oral three times a day  heparin   Injectable 5000 Unit(s) SubCutaneous every 12 hours  piperacillin/tazobactam IVPB.. 3.375 Gram(s) IV Intermittent every 8 hours  pramipexole 1 milliGRAM(s) Oral daily  senna 2 Tablet(s) Oral at bedtime  sodium chloride 0.45%. 1000 milliLiter(s) (50 mL/Hr) IV Continuous <Continuous>    MEDICATIONS  (PRN):  bisacodyl 5 milliGRAM(s) Oral every 12 hours PRN Constipation  glycerin Suppository - Adult 1 Suppository(s) Rectal daily PRN Constipation  morphine  - Injectable 2 milliGRAM(s) IV Push every 6 hours PRN Severe Pain (7 - 10)  ondansetron Injectable 4 milliGRAM(s) IV Push four times a day PRN Nausea and/or Vomiting    T(C): 36.8 (12-06-21 @ 04:49), Max: 36.9 (12-04-21 @ 20:04)  HR: 61 (12-06-21 @ 04:49) (60 - 94)  BP: 135/80 (12-06-21 @ 04:49) (125/74 - 153/95)  RR: 16 (12-06-21 @ 04:49)  SpO2: 94% (12-06-21 @ 04:49)  Wt(kg): --  I&O's Detail    05 Dec 2021 07:01  -  06 Dec 2021 07:00  --------------------------------------------------------  IN:    IV PiggyBack: 125 mL    sodium chloride 0.45%: 300 mL  Total IN: 425 mL    OUT:  Total OUT: 0 mL    Total NET: 425 mL              PHYSICAL EXAM:  General: No distress  Respiratory: b/l air entry  Cardiovascular: S1 S2  Gastrointestinal: soft  Extremities:  edema                            LABORATORY:                        13.5   7.11  )-----------( 193      ( 06 Dec 2021 07:37 )             38.4     12-06    143  |  112<H>  |  21  ----------------------------<  93  4.2   |  26  |  1.70<H>    Ca    9.1      06 Dec 2021 07:37      Sodium, Serum: 143 mmol/L (12-06 @ 07:37)  Sodium, Serum: 144 mmol/L (12-05 @ 07:29)    Potassium, Serum: 4.2 mmol/L (12-06 @ 07:37)  Potassium, Serum: 4.2 mmol/L (12-05 @ 07:29)    Hemoglobin: 13.5 g/dL (12-06 @ 07:37)  Hemoglobin: 12.5 g/dL (12-04 @ 08:23)    Creatinine, Serum 1.70 (12-06 @ 07:37)  Creatinine, Serum 2.00 (12-05 @ 07:29)  Creatinine, Serum 1.60 (12-04 @ 08:23)             Pt know to Dr. Burks as out pt as per patients wife.   Will sign out to Dr. Burks. Thank you.

## 2021-12-06 NOTE — CONSULT NOTE ADULT - CONSULT REQUESTED DATE/TIME
30-Nov-2021
06-Dec-2021 18:53
01-Dec-2021 09:00
03-Dec-2021 14:52
30-Nov-2021 17:41
01-Dec-2021 08:58

## 2021-12-07 ENCOUNTER — TRANSCRIPTION ENCOUNTER (OUTPATIENT)
Age: 86
End: 2021-12-07

## 2021-12-07 VITALS
TEMPERATURE: 98 F | RESPIRATION RATE: 18 BRPM | DIASTOLIC BLOOD PRESSURE: 80 MMHG | HEART RATE: 65 BPM | OXYGEN SATURATION: 93 % | SYSTOLIC BLOOD PRESSURE: 131 MMHG

## 2021-12-07 LAB
ANION GAP SERPL CALC-SCNC: 6 MMOL/L — SIGNIFICANT CHANGE UP (ref 5–17)
BUN SERPL-MCNC: 22 MG/DL — SIGNIFICANT CHANGE UP (ref 7–23)
CALCIUM SERPL-MCNC: 8.7 MG/DL — SIGNIFICANT CHANGE UP (ref 8.5–10.1)
CHLORIDE SERPL-SCNC: 110 MMOL/L — HIGH (ref 96–108)
CO2 SERPL-SCNC: 25 MMOL/L — SIGNIFICANT CHANGE UP (ref 22–31)
CREAT SERPL-MCNC: 1.6 MG/DL — HIGH (ref 0.5–1.3)
GLUCOSE SERPL-MCNC: 102 MG/DL — HIGH (ref 70–99)
POTASSIUM SERPL-MCNC: 3.7 MMOL/L — SIGNIFICANT CHANGE UP (ref 3.5–5.3)
POTASSIUM SERPL-SCNC: 3.7 MMOL/L — SIGNIFICANT CHANGE UP (ref 3.5–5.3)
SARS-COV-2 RNA SPEC QL NAA+PROBE: SIGNIFICANT CHANGE UP
SODIUM SERPL-SCNC: 141 MMOL/L — SIGNIFICANT CHANGE UP (ref 135–145)

## 2021-12-07 PROCEDURE — 99497 ADVNCD CARE PLAN 30 MIN: CPT

## 2021-12-07 PROCEDURE — 99232 SBSQ HOSP IP/OBS MODERATE 35: CPT

## 2021-12-07 PROCEDURE — 99231 SBSQ HOSP IP/OBS SF/LOW 25: CPT

## 2021-12-07 RX ORDER — SENNA PLUS 8.6 MG/1
2 TABLET ORAL
Qty: 0 | Refills: 0 | DISCHARGE
Start: 2021-12-07

## 2021-12-07 RX ADMIN — PRAMIPEXOLE DIHYDROCHLORIDE 1 MILLIGRAM(S): 0.12 TABLET ORAL at 11:41

## 2021-12-07 RX ADMIN — AMLODIPINE BESYLATE 2.5 MILLIGRAM(S): 2.5 TABLET ORAL at 05:58

## 2021-12-07 RX ADMIN — CARBIDOPA AND LEVODOPA 1.5 TABLET(S): 25; 100 TABLET ORAL at 13:14

## 2021-12-07 RX ADMIN — PIPERACILLIN AND TAZOBACTAM 25 GRAM(S): 4; .5 INJECTION, POWDER, LYOPHILIZED, FOR SOLUTION INTRAVENOUS at 13:15

## 2021-12-07 RX ADMIN — PIPERACILLIN AND TAZOBACTAM 25 GRAM(S): 4; .5 INJECTION, POWDER, LYOPHILIZED, FOR SOLUTION INTRAVENOUS at 05:58

## 2021-12-07 RX ADMIN — CARBIDOPA AND LEVODOPA 1.5 TABLET(S): 25; 100 TABLET ORAL at 05:57

## 2021-12-07 RX ADMIN — GABAPENTIN 100 MILLIGRAM(S): 400 CAPSULE ORAL at 13:14

## 2021-12-07 RX ADMIN — GABAPENTIN 100 MILLIGRAM(S): 400 CAPSULE ORAL at 06:07

## 2021-12-07 RX ADMIN — HEPARIN SODIUM 5000 UNIT(S): 5000 INJECTION INTRAVENOUS; SUBCUTANEOUS at 05:57

## 2021-12-07 RX ADMIN — CALCITRIOL 0.25 MICROGRAM(S): 0.5 CAPSULE ORAL at 11:41

## 2021-12-07 NOTE — PROGRESS NOTE ADULT - SUBJECTIVE AND OBJECTIVE BOX
[facility] Cardiology Progress Note [phone] (Dr. Hernandez, Robin, Michaelle, Ayan)    CHIEF COMPLAINT: Patient is a 85y old  Male who presents with a chief complaint of Acute appendicitis (06 Dec 2021 17:52)      Follow Up Today: The patient denies any chest discomfort or shortness of breath.    HPI:  NAVA DIAZ is an 84 YO Male brought to the ED with complaining of right lower abdominal pain for 3-4 days.   Patient states that pain is constant aching 7/10 pain. Patient also has chronic constipation, takes Miralax nightly, admits to smaller BM in the last few days. Last BM 2 days ago. Patient denies N/V/D, fever/chills, urinary symptoms, chest pain, SOB.        (30 Nov 2021 19:27)      PAST MEDICAL & SURGICAL HISTORY:  Parkinson disease    Shingles    HTN (hypertension)    Hypercholesteremia    Mild asthma    No significant past surgical history        MEDICATIONS  (STANDING):  amLODIPine   Tablet 2.5 milliGRAM(s) Oral daily  atorvastatin 10 milliGRAM(s) Oral at bedtime  calcitriol   Capsule 0.25 MICROGram(s) Oral daily  carbidopa/levodopa CR 25/100 1.5 Tablet(s) Oral <User Schedule>  dextrose 5%. 1000 milliLiter(s) (75 mL/Hr) IV Continuous <Continuous>  gabapentin 100 milliGRAM(s) Oral three times a day  heparin   Injectable 5000 Unit(s) SubCutaneous every 12 hours  piperacillin/tazobactam IVPB.. 3.375 Gram(s) IV Intermittent every 8 hours  pramipexole 1 milliGRAM(s) Oral daily  senna 2 Tablet(s) Oral at bedtime    MEDICATIONS  (PRN):  bisacodyl 5 milliGRAM(s) Oral every 12 hours PRN Constipation  glycerin Suppository - Adult 1 Suppository(s) Rectal daily PRN Constipation  morphine  - Injectable 2 milliGRAM(s) IV Push every 6 hours PRN Severe Pain (7 - 10)  ondansetron Injectable 4 milliGRAM(s) IV Push four times a day PRN Nausea and/or Vomiting      Allergies    No Known Allergies    Intolerances        REVIEW OF SYSTEMS:    All other review of systems is negative unless indicated above    Vital Signs Last 24 Hrs  T(C): 36.8 (07 Dec 2021 05:19), Max: 36.8 (07 Dec 2021 05:19)  T(F): 98.3 (07 Dec 2021 05:19), Max: 98.3 (07 Dec 2021 05:19)  HR: 54 (07 Dec 2021 05:19) (54 - 67)  BP: 124/70 (07 Dec 2021 05:19) (117/77 - 133/82)  BP(mean): --  RR: 18 (07 Dec 2021 05:19) (17 - 18)  SpO2: 93% (07 Dec 2021 05:19) (92% - 94%)    I&O's Summary    05 Dec 2021 07:01  -  06 Dec 2021 07:00  --------------------------------------------------------  IN: 425 mL / OUT: 0 mL / NET: 425 mL    06 Dec 2021 07:01  -  07 Dec 2021 05:48  --------------------------------------------------------  IN: 600 mL / OUT: 0 mL / NET: 600 mL        PHYSICAL EXAM:    Constitutional: NAD, awake and alert, well-developed  Eyes:  EOMI,  Pupils round, No oral cyanosis.  HEENT: No exudate or erythema  Pulmonary: Non-labored, breath sounds are clear bilaterally, No wheezing, rales or rhonchi  Cardiovascular: Regular, S1 and S2, No murmurs, rubs, gallops oir clicks  Gastrointestinal: Bowel Sounds present, soft, nontender.   Ext: No significant LE edema with good pulses x 4  Neurological: Alert, no gross focal motor deficits  Skin: No rashes.  Psych:  Mood & affect appropriate    LABS: All Labs Reviewed:                        13.5   7.11  )-----------( 193      ( 06 Dec 2021 07:37 )             38.4                         12.5   5.59  )-----------( 198      ( 04 Dec 2021 08:23 )             36.8     06 Dec 2021 07:37    143    |  112    |  21     ----------------------------<  93     4.2     |  26     |  1.70   05 Dec 2021 07:29    144    |  112    |  22     ----------------------------<  95     4.2     |  28     |  2.00   04 Dec 2021 08:23    143    |  115    |  16     ----------------------------<  100    3.9     |  22     |  1.60     Ca    9.1        06 Dec 2021 07:37  Ca    8.9        05 Dec 2021 07:29  Ca    8.3        04 Dec 2021 08:23    TPro  6.3    /  Alb  2.5    /  TBili  0.6    /  DBili  x      /  AST  21     /  ALT  8      /  AlkPhos  43     04 Dec 2021 08:23          Blood Culture:         RADIOLOGY/EKG:    Attending Attestation:   20 minutes spent on total encounter; more than 50% of the visit was spent counseling and/or coordinating care by the attending physician.     ASSESSMENT AND PLAN [facility] Progress Note [phone] (Dr. Hernandez, Robin, Michaelle, Ayan)    CHIEF COMPLAINT: Patient is a 85y old  Male who presents with a chief complaint of Acute appendicitis (06 Dec 2021 17:52)      Follow Up Today: The patient denies any chest discomfort or shortness of breath.    HPI:  NAVA DIAZ is an 86 YO Male brought to the ED with complaining of right lower abdominal pain for 3-4 days.   Patient states that pain is constant aching 7/10 pain. Patient also has chronic constipation, takes Miralax nightly, admits to smaller BM in the last few days. Last BM 2 days ago. Patient denies N/V/D, fever/chills, urinary symptoms, chest pain, SOB.        (30 Nov 2021 19:27)      PAST MEDICAL & SURGICAL HISTORY:  Parkinson disease    Shingles    HTN (hypertension)    Hypercholesteremia    Mild asthma    No significant past surgical history        MEDICATIONS  (STANDING):  amLODIPine   Tablet 2.5 milliGRAM(s) Oral daily  atorvastatin 10 milliGRAM(s) Oral at bedtime  calcitriol   Capsule 0.25 MICROGram(s) Oral daily  carbidopa/levodopa CR 25/100 1.5 Tablet(s) Oral <User Schedule>  dextrose 5%. 1000 milliLiter(s) (75 mL/Hr) IV Continuous <Continuous>  gabapentin 100 milliGRAM(s) Oral three times a day  heparin   Injectable 5000 Unit(s) SubCutaneous every 12 hours  piperacillin/tazobactam IVPB.. 3.375 Gram(s) IV Intermittent every 8 hours  pramipexole 1 milliGRAM(s) Oral daily  senna 2 Tablet(s) Oral at bedtime    MEDICATIONS  (PRN):  bisacodyl 5 milliGRAM(s) Oral every 12 hours PRN Constipation  glycerin Suppository - Adult 1 Suppository(s) Rectal daily PRN Constipation  morphine  - Injectable 2 milliGRAM(s) IV Push every 6 hours PRN Severe Pain (7 - 10)  ondansetron Injectable 4 milliGRAM(s) IV Push four times a day PRN Nausea and/or Vomiting      Allergies    No Known Allergies    Intolerances        REVIEW OF SYSTEMS:    All other review of systems is negative unless indicated above    Vital Signs Last 24 Hrs  T(C): 36.8 (07 Dec 2021 05:19), Max: 36.8 (07 Dec 2021 05:19)  T(F): 98.3 (07 Dec 2021 05:19), Max: 98.3 (07 Dec 2021 05:19)  HR: 54 (07 Dec 2021 05:19) (54 - 67)  BP: 124/70 (07 Dec 2021 05:19) (117/77 - 133/82)  BP(mean): --  RR: 18 (07 Dec 2021 05:19) (17 - 18)  SpO2: 93% (07 Dec 2021 05:19) (92% - 94%)    I&O's Summary    05 Dec 2021 07:01  -  06 Dec 2021 07:00  --------------------------------------------------------  IN: 425 mL / OUT: 0 mL / NET: 425 mL    06 Dec 2021 07:01  -  07 Dec 2021 05:48  --------------------------------------------------------  IN: 600 mL / OUT: 0 mL / NET: 600 mL        PHYSICAL EXAM:    Constitutional: NAD, awake and alert, well-developed  Eyes:  EOMI,  Pupils round, No oral cyanosis.  HEENT: No exudate or erythema  Pulmonary: Non-labored, breath sounds are clear bilaterally, No wheezing, rales or rhonchi  Cardiovascular: Regular, S1 and S2, No murmurs  Gastrointestinal: Bowel Sounds present, soft, nontender.   Ext: No significant LE edema  Neurological: Alert, no gross focal motor deficits  Skin: No rashes.  Psych:  Mood & affect appropriate    LABS: All Labs Reviewed:                        13.5   7.11  )-----------( 193      ( 06 Dec 2021 07:37 )             38.4                         12.5   5.59  )-----------( 198      ( 04 Dec 2021 08:23 )             36.8     06 Dec 2021 07:37    143    |  112    |  21     ----------------------------<  93     4.2     |  26     |  1.70   05 Dec 2021 07:29    144    |  112    |  22     ----------------------------<  95     4.2     |  28     |  2.00   04 Dec 2021 08:23    143    |  115    |  16     ----------------------------<  100    3.9     |  22     |  1.60     Ca    9.1        06 Dec 2021 07:37  Ca    8.9        05 Dec 2021 07:29  Ca    8.3        04 Dec 2021 08:23    TPro  6.3    /  Alb  2.5    /  TBili  0.6    /  DBili  x      /  AST  21     /  ALT  8      /  AlkPhos  43     04 Dec 2021 08:23          Blood Culture:         RADIOLOGY/EKG:    Attending Attestation:   20 minutes spent on total encounter; more than 50% of the visit was spent counseling and/or coordinating care by the attending physician.     ASSESSMENT AND PLAN

## 2021-12-07 NOTE — DISCHARGE NOTE NURSING/CASE MANAGEMENT/SOCIAL WORK - PATIENT PORTAL LINK FT
You can access the FollowMyHealth Patient Portal offered by [facility] by registering at the following website: [email or web address]. By joining [facility]’s FollowMyHealth portal, you will also be able to view your health information using other applications (apps) compatible with our system.

## 2021-12-07 NOTE — DISCHARGE NOTE NURSING/CASE MANAGEMENT/SOCIAL WORK - NSDCPEFALRISK_GEN_ALL_CORE
For information on Fall & Injury Prevention, visit: [email or web address] OR  [email or web address] OR  https://www.cdc.gov/steadi/patient.html

## 2021-12-07 NOTE — DISCHARGE NOTE PROVIDER - HOSPITAL COURSE
admitted for acute appendicitis  underwent colonoscopy  no ileocecal mass  ABX per ID  no urgent surgical intervention at this time  DC after ID and surgical clearance

## 2021-12-07 NOTE — PROGRESS NOTE ADULT - SUBJECTIVE AND OBJECTIVE BOX
[facility]  INFECTIOUS DISEASES   [address] [place] [address]  Tel: [phone]     Fax: [phone]  ======================================================  MD Bubba Gallegos Kaushal, MD Cho, Michelle, MD   ======================================================    [id number]  NAVA VITGERARDBUSHRAILIANA     Follow up: Acute appendicitis ?    No abdominal pain, on regular diet tolerating well. No diarrhea.   No fever. Colonoscopy with one tubular polyp otherwise no malignancy or abnormal findings.     PAST MEDICAL & SURGICAL HISTORY:  Parkinson disease  Shingles  HTN (hypertension)  Hypercholesteremia  Mild asthma  No significant past surgical history    Social Hx: no smoking, ETOH or drugs     FAMILY HISTORY:  No pertinent family history in first degree relatives    Allergies  No Known Allergies    Antibiotics:  piperacillin/tazobactam IVPB.. 3.375 Gram(s) IV Intermittent every 8 hours    REVIEW OF SYSTEMS:  CONSTITUTIONAL:  No Fever or chills  HEENT:  No diplopia or blurred vision.  No sore throat or runny nose.  CARDIOVASCULAR:  No chest pain or SOB.  RESPIRATORY:  No cough, shortness of breath, PND or orthopnea.  GASTROINTESTINAL:  No nausea, vomiting or diarrhea.  GENITOURINARY:  No dysuria, frequency or urgency. No Blood in urine  MUSCULOSKELETAL:  no joint aches, no muscle pain  SKIN:  No change in skin, hair or nails.  NEUROLOGIC:  No paresthesias, fasciculations, seizures or weakness.  PSYCHIATRIC:  No disorder of thought or mood.  ENDOCRINE:  No heat or cold intolerance, polyuria or polydipsia.  HEMATOLOGICAL:  No easy bruising or bleeding.     Physical Exam:  Vital Signs Last 24 Hrs  T(C): 36.8 (07 Dec 2021 14:00), Max: 36.8 (07 Dec 2021 05:19)  T(F): 98.2 (07 Dec 2021 14:00), Max: 98.3 (07 Dec 2021 05:19)  HR: 65 (07 Dec 2021 14:00) (54 - 65)  BP: 131/80 (07 Dec 2021 14:00) (119/69 - 131/80)  BP(mean): --  RR: 18 (07 Dec 2021 14:00) (17 - 18)  SpO2: 93% (07 Dec 2021 14:00) (92% - 93%)  GEN: NAD  HEENT: normocephalic and atraumatic. EOMI. PERRL.    NECK: Supple.  No lymphadenopathy   LUNGS: Clear to auscultation.  HEART: Regular rate and rhythm without murmur.  ABDOMEN: Soft, nontender, and nondistended.  Positive bowel sounds.    : No CVA tenderness  EXTREMITIES: Without any cyanosis, clubbing, rash, lesions or edema.  NEUROLOGIC: grossly intact.  PSYCHIATRIC: Appropriate affect .  SKIN: No ulceration or induration present.    Labs:                        13.5   7.11  )-----------( 193      ( 06 Dec 2021 07:37 )             38.4     12-07    141  |  110<H>  |  22  ----------------------------<  102<H>  3.7   |  25  |  1.60<H>    Ca    8.7      07 Dec 2021 07:57    WBC Count: 7.11 K/uL (12-06-21 @ 07:37)  WBC Count: 5.59 K/uL (12-04-21 @ 08:23)  WBC Count: 6.38 K/uL (12-03-21 @ 07:23)    Creatinine, Serum: 1.60 mg/dL (12-07-21 @ 07:57)  Creatinine, Serum: 1.70 mg/dL (12-06-21 @ 07:37)  Creatinine, Serum: 2.00 mg/dL (12-05-21 @ 07:29)  Creatinine, Serum: 1.60 mg/dL (12-04-21 @ 08:23)  Creatinine, Serum: 1.70 mg/dL (12-03-21 @ 07:23)    COVID-19 PCR: NotDetec (12-07-21 @ 05:32)  COVID-19 PCR: NotDetec (12-02-21 @ 07:44)  COVID-19 PCR: NotDetec (11-30-21 @ 13:09)    All imaging and other data have been reviewed.  < from: CT Abdomen and Pelvis w/ Oral Cont (11.30.21 @ 15:59) >  IMPRESSION:  Acute nonperforated appendicitis with soft tissue mass at the base of the appendix measuring approximately 3.4 cm in diameter. Findings are concerning for malignancy.    Assessment and Plan:   86 yo man with PMH of HTN, asthma and parkinson's disease was admitted with right lower abdominal pain for 3-4 days.   CT was done showing possible acute appendicitis with no perforation but also malignancy was a concern.     S/P Colonoscopy on 12/03.     Acute appendicitis:  - GI and surgery follow up noted, pathology negative, one tubular adenoma otherwise normal colonic mucosa  - Can switch zosyn to augmentin 875mg for one week.   - Follow up with GI after discharge     Will sign off please call with any question.   D/W Dr. Torres.     Suzanne Miller MD  Division of Infectious Diseases   Cell [phone] between 8am and 6pm   After 6pm and weekends please call ID service at [phone].     30 minutes spent on total encounter assessing patient, examination, chart reivew, counseling and coordinating care by the attending physician/nurse/care manager.

## 2021-12-07 NOTE — PROGRESS NOTE ADULT - ASSESSMENT
85M w/ Parkinson's disease, HTN, HLD presents to ED c/o right sided abd pain x 3-4 days with suspicion for acute appendicitis and possible mass.  Now s/p colonoscopy. Still with RIGHT sided chest discomfort.  No short of breath, no edema and no palpitations.  Pathology negative for malignancy and surgery deferred at this time.    Suggest:  1.  Possible appendicitis s/p colonoscopy awaiting bx/pathology results  - Cardiac optimized for Appendectomy    2. Chest discomfort Atypical Right sided  - Trop-NEGATIVE, doubt cardiac etiology  - Echo unchanged with no gross wall motion abnormalities    3. HTN - BP well controlled   - continue with amlodipine 2.5mg daily    Prior TTE 11/2019 with normal EF and mild AS.  Prior EKG with IRBBB.   Yesterday Echo shows normal LVEF with mild AS      Will sign off  Please call as needed

## 2021-12-07 NOTE — DISCHARGE NOTE PROVIDER - CARE PROVIDER_API CALL
Enoc Garcia)  Surgery  [address] [place] [address]  Phone: [phone]  Fax: [phone]  Follow Up Time:     Brian Bates (DO)  Internal Medicine  [address] [place] [address]  Phone: [phone]  Fax: [phone]  Follow Up Time:     Amico, Frank J (DO)  Internal Medicine  [address], [address]  [place] [address]  Phone: [phone]  Fax: [phone]  Follow Up Time:

## 2021-12-07 NOTE — PROGRESS NOTE ADULT - SUBJECTIVE AND OBJECTIVE BOX
Patient is a 85y Male whom presented to the hospital with     PAST MEDICAL & SURGICAL HISTORY:  Parkinson disease    Shingles    HTN (hypertension)    Hypercholesteremia    Mild asthma    No significant past surgical history        MEDICATIONS  (STANDING):  amLODIPine   Tablet 2.5 milliGRAM(s) Oral daily  atorvastatin 10 milliGRAM(s) Oral at bedtime  calcitriol   Capsule 0.25 MICROGram(s) Oral daily  carbidopa/levodopa CR 25/100 1.5 Tablet(s) Oral <User Schedule>  dextrose 5%. 1000 milliLiter(s) (75 mL/Hr) IV Continuous <Continuous>  gabapentin 100 milliGRAM(s) Oral three times a day  heparin   Injectable 5000 Unit(s) SubCutaneous every 12 hours  piperacillin/tazobactam IVPB.. 3.375 Gram(s) IV Intermittent every 8 hours  pramipexole 1 milliGRAM(s) Oral daily  senna 2 Tablet(s) Oral at bedtime      Allergies    No Known Allergies    Intolerances        SOCIAL HISTORY:  Denies ETOh,Smoking,     FAMILY HISTORY:  No pertinent family history in first degree relatives        REVIEW OF SYSTEMS:    CONSTITUTIONAL: No weakness, fevers or chills  RESPIRATORY: No cough, wheezing, hemoptysis; No shortness of breath  CARDIOVASCULAR: No chest pain or palpitations  GASTROINTESTINAL: No abdominal or epigastric pain. No nausea, vomiting,     No diarrhea or constipation. No melena   GENITOURINARY: No dysuria, frequency or hematuria                          13.5   7.11  )-----------( 193      ( 06 Dec 2021 07:37 )             38.4       CBC Full  -  ( 06 Dec 2021 07:37 )  WBC Count : 7.11 K/uL  RBC Count : 4.33 M/uL  Hemoglobin : 13.5 g/dL  Hematocrit : 38.4 %  Platelet Count - Automated : 193 K/uL  Mean Cell Volume : 88.7 fl  Mean Cell Hemoglobin : 31.2 pg  Mean Cell Hemoglobin Concentration : 35.2 gm/dL  Auto Neutrophil # : x  Auto Lymphocyte # : x  Auto Monocyte # : x  Auto Eosinophil # : x  Auto Basophil # : x  Auto Neutrophil % : x  Auto Lymphocyte % : x  Auto Monocyte % : x  Auto Eosinophil % : x  Auto Basophil % : x      12-07    141  |  110<H>  |  22  ----------------------------<  102<H>  3.7   |  25  |  1.60<H>    Ca    8.7      07 Dec 2021 07:57        CAPILLARY BLOOD GLUCOSE          Vital Signs Last 24 Hrs  T(C): 36.8 (07 Dec 2021 14:00), Max: 36.8 (07 Dec 2021 05:19)  T(F): 98.2 (07 Dec 2021 14:00), Max: 98.3 (07 Dec 2021 05:19)  HR: 65 (07 Dec 2021 14:00) (54 - 65)  BP: 131/80 (07 Dec 2021 14:00) (119/69 - 131/80)  BP(mean): --  RR: 18 (07 Dec 2021 14:00) (17 - 18)  SpO2: 93% (07 Dec 2021 14:00) (92% - 93%)              PHYSICAL EXAM:    Constitutional: NAD  HEENT: conjunctive   clear   Neck:  No JVD  Respiratory: CTAB  Cardiovascular: S1 and S2  Gastrointestinal: BS+, soft,   Extremities: No peripheral edema

## 2021-12-07 NOTE — PROGRESS NOTE ADULT - PROVIDER SPECIALTY LIST ADULT
Gastroenterology
Infectious Disease
Infectious Disease
Nephrology
Nephrology
Surgery
Surgery
Gastroenterology
Gastroenterology
Infectious Disease
Nephrology
Surgery
Anesthesia
Cardiology
Gastroenterology
Nephrology
Surgery
Surgery
Family Medicine
Gastroenterology
Nephrology
Family Medicine
Surgery
Surgery
Family Medicine
Hospitalist
Family Medicine
Family Medicine

## 2021-12-07 NOTE — DISCHARGE NOTE PROVIDER - NSDCCPCAREPLAN_GEN_ALL_CORE_FT
PRINCIPAL DISCHARGE DIAGNOSIS  Diagnosis: Acute appendicitis  Assessment and Plan of Treatment: follow up with surgeon Dr. COOL

## 2021-12-07 NOTE — CHART NOTE - NSCHARTNOTEFT_GEN_A_CORE

## 2021-12-07 NOTE — PROGRESS NOTE ADULT - SUBJECTIVE AND OBJECTIVE BOX
SURGERY PA NOTE ON BEHALF OF DR. COOL / GENERAL SURGERY:    S: Patient seen and examined at bedside.  Patient denies complaints.  Tolerating diet without pain, nausea, or vomiting.  Reports normal bowel and bladder function.  Denies CP/SOB/[place]/palpitations/dizziness/lightheadedness.        MEDICATIONS:  amLODIPine   Tablet 2.5 milliGRAM(s) Oral daily  atorvastatin 10 milliGRAM(s) Oral at bedtime  bisacodyl 5 milliGRAM(s) Oral every 12 hours PRN  calcitriol   Capsule 0.25 MICROGram(s) Oral daily  carbidopa/levodopa CR 25/100 1.5 Tablet(s) Oral <User Schedule>  dextrose 5%. 1000 milliLiter(s) IV Continuous <Continuous>  gabapentin 100 milliGRAM(s) Oral three times a day  glycerin Suppository - Adult 1 Suppository(s) Rectal daily PRN  heparin   Injectable 5000 Unit(s) SubCutaneous every 12 hours  morphine  - Injectable 2 milliGRAM(s) IV Push every 6 hours PRN  ondansetron Injectable 4 milliGRAM(s) IV Push four times a day PRN  piperacillin/tazobactam IVPB.. 3.375 Gram(s) IV Intermittent every 8 hours  pramipexole 1 milliGRAM(s) Oral daily  senna 2 Tablet(s) Oral at bedtime      O:  Vital Signs Last 24 Hrs  T(C): 36.8 (07 Dec 2021 05:19), Max: 36.8 (07 Dec 2021 05:19)  T(F): 98.3 (07 Dec 2021 05:19), Max: 98.3 (07 Dec 2021 05:19)  HR: 54 (07 Dec 2021 05:19) (54 - 67)  BP: 124/70 (07 Dec 2021 05:19) (117/77 - 133/82)  RR: 18 (07 Dec 2021 05:19) (17 - 18)  SpO2: 93% (07 Dec 2021 05:19) (92% - 94%)    I&O SUMMARY:    12-06-21 @ 07:01  -  12-07-21 @ 07:00  --------------------------------------------------------  IN: 600 mL / OUT: 550 mL / NET: 50 mL        PHYSICAL EXAM:  Lungs: CTA bilat without W/R/R  Card: S1S2  Abd: Soft, NT, ND.  +BS x 4.  No rebound/guarding.    Ext: Calves soft, NT, without edema bilat    LABS:                        13.5   7.11  )-----------( 193      ( 06 Dec 2021 07:37 )             38.4     12-06    143  |  112<H>  |  21  ----------------------------<  93  4.2   |  26  |  1.70<H>    Ca    9.1      06 Dec 2021 07:37      A:  85-yo male with h/o Parkinson's disease, HTN; a/w appendicitis/appendiceal mass  S/P colonoscopy POD #4, negative for malignancy     P:  Patient currently comfortable and without complaints   Labwork pending for today  Mild bradycardia noted earlier this am - vitals otherwise stable and reassuring   Patient remains surgically stable  As per Dr. Cool - plan is for interval appendectomy in the coming weeks  Recommend D/C with continuation of PO abx for an additional 7d - recs per ID as well  D/C planning per primary team  Will d/w Dr. Cool, will follow

## 2021-12-07 NOTE — PROGRESS NOTE ADULT - ASSESSMENT
85y.o. Male with colonic mass vs appendicitis    s/p colonoscopy - appendiceal orifice area biopsied  pathology noted, appendiceal bx negative for malignancy  CEA WNL  surgery recs noted, currently planning for interval appendectomy in the future  con't diet as tolerated  con't abx    Advanced care planning was discussed with patient and family.  Advanced care planning forms were reviewed and discussed.  Risks, benefits and alternatives of gastroenterologic procedures were discussed in detail and all questions were answered.    30 minutes spent.

## 2021-12-07 NOTE — PROGRESS NOTE ADULT - ASSESSMENT
NAVA DIAZ is an 86 YO Male brought to the ED with complaining of right lower abdominal pain for 3-4 days.   Patient states that pain is constant aching 7/10 pain. Patient also has chronic constipation, takes Miralax nightly, admits to smaller BM in the last few days. Last BM 2 days ago. Patient denies N/V/D, fever/chills, urinary symptoms, chest pain, SOB.       1- CHRONIC KIDNEY DISEASE, STAGE 3: cr base line is 1.6 -1.7   Serum creatinine is stable at 1.7 , approximating a GFR is controlled   There is no progression.  No uremic symptoms. No evidence of  worsening  Anemia. Fluid status stable.   Will continue to avoid nephrotoxic drugs.  Patient remains asymptomatic.  Continue current therapy.    2- ANEMIA PLAN:  Anemia of chronic disease:    We will consider  Aranesp aiming for a HCT of 32-36 %.   We will monitor Iron stores, B12 and RBC folate .    3- BP monitoring,continue current antihypertensive meds, low salt diet,followup with PMD in 1-2 weeks

## 2021-12-07 NOTE — PROGRESS NOTE ADULT - REASON FOR ADMISSION
Acute appendicitis

## 2021-12-07 NOTE — PROGRESS NOTE ADULT - NUTRITIONAL ASSESSMENT
MEDICATIONS  (STANDING):  amLODIPine   Tablet 2.5 milliGRAM(s) Oral daily  atorvastatin 10 milliGRAM(s) Oral at bedtime  calcitriol   Capsule 0.25 MICROGram(s) Oral daily  carbidopa/levodopa CR 25/100 1.5 Tablet(s) Oral <User Schedule>  dextrose 5%. 1000 milliLiter(s) (75 mL/Hr) IV Continuous <Continuous>  gabapentin 100 milliGRAM(s) Oral three times a day  heparin   Injectable 5000 Unit(s) SubCutaneous every 12 hours  piperacillin/tazobactam IVPB.. 3.375 Gram(s) IV Intermittent every 8 hours  pramipexole 1 milliGRAM(s) Oral daily  senna 2 Tablet(s) Oral at bedtime

## 2021-12-07 NOTE — DISCHARGE NOTE PROVIDER - NSDCMRMEDTOKEN_GEN_ALL_CORE_FT
amLODIPine 2.5 mg oral tablet: 1 tab(s) orally once a day (in the morning)  amoxicillin-clavulanate 875 mg-125 mg oral tablet: 1 tab(s) orally 2 times a day   atorvastatin 10 mg oral tablet: 1 tab(s) orally once a day  bisacodyl 5 mg oral delayed release tablet: 1 tab(s) orally every 12 hours, As needed, Constipation  calcitriol 0.25 mcg oral capsule: 1 cap(s) orally every other day  carbidopa-levodopa 25 mg-100 mg oral tablet, extended release: 1.5 tab(s) orally 3 times a day at 8a, 12p, 6p  Linzess 145 mcg oral capsule: 1 cap(s) orally once a day  pramipexole 1 mg oral tablet: 1 tab(s) orally 3 times a day at 8a 12p, 6p  rivastigmine 4.6 mg/24 hr transdermal film, extended release: 1 patch transdermal once a day  senna oral tablet: 2 tab(s) orally once a day (at bedtime)  Ventolin HFA 90 mcg/inh inhalation aerosol: 2 puff(s) inhaled 3 times a day, As Needed   VESIcare 5 mg oral tablet: 1 tab(s) orally once a day

## 2021-12-07 NOTE — PROGRESS NOTE ADULT - SUBJECTIVE AND OBJECTIVE BOX
[facility]  Dakota Díaz PA-C  [address] [place] [address]  [phone]      INTERVAL HPI/OVERNIGHT EVENTS:  Pt s/e  Pt states he feels well, denies GI complaints    MEDICATIONS  (STANDING):  amLODIPine   Tablet 2.5 milliGRAM(s) Oral daily  atorvastatin 10 milliGRAM(s) Oral at bedtime  calcitriol   Capsule 0.25 MICROGram(s) Oral daily  carbidopa/levodopa CR 25/100 1.5 Tablet(s) Oral <User Schedule>  dextrose 5%. 1000 milliLiter(s) (75 mL/Hr) IV Continuous <Continuous>  gabapentin 100 milliGRAM(s) Oral three times a day  heparin   Injectable 5000 Unit(s) SubCutaneous every 12 hours  piperacillin/tazobactam IVPB.. 3.375 Gram(s) IV Intermittent every 8 hours  pramipexole 1 milliGRAM(s) Oral daily  senna 2 Tablet(s) Oral at bedtime    MEDICATIONS  (PRN):  bisacodyl 5 milliGRAM(s) Oral every 12 hours PRN Constipation  glycerin Suppository - Adult 1 Suppository(s) Rectal daily PRN Constipation  morphine  - Injectable 2 milliGRAM(s) IV Push every 6 hours PRN Severe Pain (7 - 10)  ondansetron Injectable 4 milliGRAM(s) IV Push four times a day PRN Nausea and/or Vomiting      Allergies    No Known Allergies      PHYSICAL EXAM:   Vital Signs:  Vital Signs Last 24 Hrs  T(C): 36.8 (07 Dec 2021 05:19), Max: 36.8 (07 Dec 2021 05:19)  T(F): 98.3 (07 Dec 2021 05:19), Max: 98.3 (07 Dec 2021 05:19)  HR: 54 (07 Dec 2021 05:19) (54 - 67)  BP: 124/70 (07 Dec 2021 05:19) (117/77 - 133/82)  BP(mean): --  RR: 18 (07 Dec 2021 05:19) (17 - 18)  SpO2: 93% (07 Dec 2021 05:19) (92% - 94%)  Daily     Daily Weight in k.6 (07 Dec 2021 05:19)    GENERAL:  Appears stated age,   HEENT:  NC/AT,    CHEST:  Full & symmetric excursion,   HEART:  Regular rhythm,  ABDOMEN:  Soft, non-tender, non-distended,  EXTEREMITIES:  no cyanosis  SKIN:  No rash  NEURO:  Alert,       LABS:                        13.5   7.11  )-----------( 193      ( 06 Dec 2021 07:37 )             38.4     12    141  |  110<H>  |  22  ----------------------------<  102<H>  3.7   |  25  |  1.60<H>    Ca    8.7      07 Dec 2021 07:57            RADIOLOGY & ADDITIONAL TESTS:

## 2021-12-07 NOTE — DISCHARGE NOTE PROVIDER - CARE PROVIDERS DIRECT ADDRESSES
[email or web address],DirectAddress_Unknown,DirectAddress_Unknown

## 2021-12-07 NOTE — GOALS OF CARE CONVERSATION - ADVANCED CARE PLANNING - CONVERSATION DETAILS
Writer met with pt and wife at bedside. Reviewed patient's medical and social history as well as events leading to patient's hospitalization. Writer discussed patient's current diagnosis (acute remington.HTN,mild asthma,,Parkinsons,shingles),  medical condition and management,  prognosis, and life expectancy. Inquired about patient's wishes regarding extent of medical care to be provided including escalation of medical care into the ICU and use of vasopressor support. In addition, the writer inquired about thoughts regarding cardiopulmnary resuscitation, artificial nutrition and hydration including use of feeding tubes and IVF, antibiotics, and further investigative studies such as blood draws and radiology.Chloe   showed good  insight into medical condition. All questions answered. Writer recommended she review all his paperwork for living will and HCP.Wife knows pt wants resuscitation but doesn't want machines. Psychosocial support provided.

## 2021-12-08 PROBLEM — J45.909 UNSPECIFIED ASTHMA, UNCOMPLICATED: Chronic | Status: ACTIVE | Noted: 2021-12-01

## 2021-12-08 PROBLEM — E78.00 PURE HYPERCHOLESTEROLEMIA, UNSPECIFIED: Chronic | Status: ACTIVE | Noted: 2021-12-01

## 2021-12-21 ENCOUNTER — APPOINTMENT (OUTPATIENT)
Dept: SURGERY | Facility: CLINIC | Age: 86
End: 2021-12-21
Payer: MEDICARE

## 2021-12-21 DIAGNOSIS — Z86.69 PERSONAL HISTORY OF OTHER DISEASES OF THE NERVOUS SYSTEM AND SENSE ORGANS: ICD-10-CM

## 2021-12-21 DIAGNOSIS — K35.30 ACUTE APPENDICITIS W/ LOCALIZED PERITONITIS, W/O PERFORATION OR GANGRENE: ICD-10-CM

## 2021-12-21 PROCEDURE — 99212 OFFICE O/P EST SF 10 MIN: CPT

## 2021-12-21 NOTE — ASSESSMENT
[FreeTextEntry1] : Physical exam limited by telehealth platform.  Findings documented above as seen on Video interface and by patient accounts.\par \par Spoke with patient's wife today.  Feels well.  Tolerating diet.  Regular bowel function.  Denies nausea or vomiting.  Denies fevers or chills.  RLQ pain completely resolved.\par \par Discussed with the wife that I would like to see him again in about one month to reassess.  Consider interval imaging and potential for interval appendectomy.\par \par \par

## 2021-12-21 NOTE — HISTORY OF PRESENT ILLNESS
[Home] : at home, [unfilled] , at the time of the visit. [Medical Office: ([facility])___] : at the medical office located in  [Verbal consent obtained from patient] : the patient, [unfilled] [de-identified] : Recent hospitalization for suspected acute appendicitis possible cecal mass.  Colonoscopy negative for mass.  Several small benign polypls.  Several day history prior to arrival.  \par Parkinsons Disease.  Surgery deferred in efforts for conservative management.\par Uncomplicated hospital course.  Responded well to antibiotics.\par Discharge to home for outpt follow up and arrange for interval appendectomy as indicated.

## 2021-12-30 PROCEDURE — 97162 PT EVAL MOD COMPLEX 30 MIN: CPT

## 2021-12-30 PROCEDURE — 82652 VIT D 1 25-DIHYDROXY: CPT

## 2021-12-30 PROCEDURE — 96361 HYDRATE IV INFUSION ADD-ON: CPT

## 2021-12-30 PROCEDURE — 99285 EMERGENCY DEPT VISIT HI MDM: CPT | Mod: 25

## 2021-12-30 PROCEDURE — 93005 ELECTROCARDIOGRAM TRACING: CPT

## 2021-12-30 PROCEDURE — 97166 OT EVAL MOD COMPLEX 45 MIN: CPT

## 2021-12-30 PROCEDURE — 97116 GAIT TRAINING THERAPY: CPT

## 2021-12-30 PROCEDURE — 82378 CARCINOEMBRYONIC ANTIGEN: CPT

## 2021-12-30 PROCEDURE — 93306 TTE W/DOPPLER COMPLETE: CPT

## 2021-12-30 PROCEDURE — 88305 TISSUE EXAM BY PATHOLOGIST: CPT

## 2021-12-30 PROCEDURE — 80048 BASIC METABOLIC PNL TOTAL CA: CPT

## 2021-12-30 PROCEDURE — 84100 ASSAY OF PHOSPHORUS: CPT

## 2021-12-30 PROCEDURE — U0005: CPT

## 2021-12-30 PROCEDURE — 80053 COMPREHEN METABOLIC PANEL: CPT

## 2021-12-30 PROCEDURE — 74176 CT ABD & PELVIS W/O CONTRAST: CPT | Mod: MA

## 2021-12-30 PROCEDURE — C1889: CPT

## 2021-12-30 PROCEDURE — 87635 SARS-COV-2 COVID-19 AMP PRB: CPT

## 2021-12-30 PROCEDURE — U0003: CPT

## 2021-12-30 PROCEDURE — 36415 COLL VENOUS BLD VENIPUNCTURE: CPT

## 2021-12-30 PROCEDURE — 82306 VITAMIN D 25 HYDROXY: CPT

## 2021-12-30 PROCEDURE — 82310 ASSAY OF CALCIUM: CPT

## 2021-12-30 PROCEDURE — 83690 ASSAY OF LIPASE: CPT

## 2021-12-30 PROCEDURE — 83970 ASSAY OF PARATHORMONE: CPT

## 2021-12-30 PROCEDURE — 84484 ASSAY OF TROPONIN QUANT: CPT

## 2021-12-30 PROCEDURE — 85025 COMPLETE CBC W/AUTO DIFF WBC: CPT

## 2021-12-30 PROCEDURE — 81003 URINALYSIS AUTO W/O SCOPE: CPT

## 2021-12-30 PROCEDURE — 85027 COMPLETE CBC AUTOMATED: CPT

## 2021-12-30 PROCEDURE — 96360 HYDRATION IV INFUSION INIT: CPT

## 2023-03-01 NOTE — ED PROVIDER NOTE - WEIGHT BEARING
No. YANETH screening performed.  STOP BANG Legend: 0-2 = LOW Risk; 3-4 = INTERMEDIATE Risk; 5-8 = HIGH Risk
able

## 2023-08-21 NOTE — ED PROVIDER NOTE - IV ALTEPLASE ADMIN OUTSIDE HIDDEN
Behavioral Health Psychotherapy Assessment    Date of Initial Psychotherapy Assessment: 08/21/23  Referral Source: Dr. Juan Graf  Has a release of information been signed for the referral source? NA    Preferred Name: Ariane Pablo  Preferred Pronouns: He/Him  YOB: 1958 Age: 72 y.o. Sex assigned at birth: Male   Gender Identity: Male  Race:   Preferred Language: English     Emergency Contact:  Full Name: Danisha Ramos  Relationship to Client: Wife  Contact information: [phone]    Primary Care Physician:  Manjeet Carrillo DO  [address] [facility]  [address] [place] [address]  [phone]  Has a release of information been signed? No    Physical Health History:  Past surgical procedures: None reported  Do you have a history of any of the following: recurrent major depressive disorder, anxiety   Do you have any mobility issues? No    Relevant Family History:  Francesca Swann has been  22 years,  at age 36. Wife has diabetes and was recently hospitalized due to sugars in blood being so high. Last several weeks she has been staying at friends who are nurses after leaving hospital to help her stabilize her health. He describes their relationship as strained, 'we argue a lot, she doesn't back me, blames me for a lot.'  Pt has 3 step children, all grown, all living locally. He relates he raised his step children but in recent years they have built a good relationship with their bio father and make comments to him that they never needed him and still don't. The children's bio father is turning 79 this weekend and he has been invited to his birthday party and plans to go but isn't sure how he will be treated. He relates his wife says she won't get involved and backs her children. He was raised primarily by mother, reports no abuse in his childhood. He never really knew his father except in the last 11 years when his father discovered him again on Facebook.  Shortly after they started having some phone/electronic contact his father committed suicide (walked into the woods and shot himself). Fernando Valente has one half brother, is not close to him. Presenting Problem (What brings you in?)  Depression, lack of motivation, fatigue, doesn't want to get out of bed until at least 1 pm, where used to get up much earlier, he relates his mood starts to improve as the day goes on. He is retired and worries about money and his wife's spending habit. He relaetes he rarely goes out anymore and has lost contact with many friends. Believes he has struggled with social anxiety for a long time and used to cope with alcohol. He relates now that more people know of his mental health issues and his wife and step kids aren't backing him, his social anxiety has worsened as has his motivation. He was hospitalized for depression about 8-9 years ago on 2 occassions, denied any SI, HI at the time or now, did not find it helpful and denies he was suicidal then. He relates he has no access to guns in the home. He has become more of a loner relating he used to go to the movies, fish, now spends most of his time watching tv. He recently had to put down his former dog ([address]) of 18 years in the last several months. He was very attached to his dog. His wife went and got them a chocolate lab without asking or consulting him. Patient has struggled with Seasonal Affective Disorder for a long time, lately has been feeling depressed across the seasons and more anxious. Mental Health Advance Directive:  Do you currently have a Mental Health Advance Directive: Yes     Diagnosis:   Diagnosis ICD-10-CM Associated Orders   1.  OSITO (generalized anxiety disorder)  F41.1       2. Episode of recurrent major depressive disorder, unspecified depression episode severity ([address])  F33.9           Initial Assessment:     Current Mental Status:    Appearance: appropriate      Behavior/Manner: cooperative      Affect/Mood:  Blue, depressed and sad Speech:  Normal    Sleep:  Normal    Oriented to: oriented to self, oriented to place and oriented to time       Clinical Symptoms    Depression: yes      Anxiety: yes      Depression Symptoms: depressed mood, serious loss of interest in things, social isolation and fatigue      Anxiety Symptoms: excessive worry and nervous/anxious      Have you ever been assaultive to others or the environment: No      Have you ever been self-injurious: No      Counseling History:  Previous Counseling or Treatment  (Mental Health or Drug & Alcohol): No    Have you previously taken psychiatric medications: Yes      Suicide Risk Assessment  Have you ever had a suicide attempt: No    Have you had incidents of suicidal ideation: Yes    Are you currently experiencing suicidal thoughts: No    Additional Suicide Risk Information:  Denies any SI, self harm, HI. Denies any access to guns. Bio father committed suicide, shot himself approx. 5 years ago.     Substance Abuse/Addiction Assessment:  Alcohol: Yes    Age of First Use:  Excessive use in young adulthood  Heroin: No    Fentanyl: No    Opiates: No    Cocaine: No    Amphetamines: No    Hallucinogens: No    Club Drugs: No    Benzodiazepines: No    Other Rx Meds: No    Marijuana: Yes    Method:  Smoke/pipe  Last Use:  Used medicial marijuana, 'too expensive'  Tobacco/Nicotine: No    Have you experienced blackouts as a result of substance use: No    Have you ever overdosed on any substances?: No    Are you currently using any Medication Assisted Treatment for Substance Use: No      Compulsive Behaviors:  Compulsive Behavior Information:  None    Disordered Eating History:  Do you have a history of disordered eating: No      Social Determinants of Health:    SDOH:  Social isolation and stress    Trauma and Abuse History:    Have you ever been abused: No      Legal History:    Have you ever been arrested or had a DUI: Yes      Have you been incarcerated: No      Are you currently on parole/probation: No      Any current Children and Youth involvement: No      Any pending legal charges: No      Additional Legal History:  Never been convicted of DUI but was charged several times for drinking and driving 'beat charges every time'    Relationship History:    Current marital status: single      Relationship History:  Few if any, has some friends but has lost contact    Employment History    Are you currently employed: No      Currently seeking employment: No      Longest period of employment:  Worked for [employer]    Future work goals:  None    Sources of income/financial support:  correction Pension and Social Security Disability (SSDI)     History:      Status: unknown  [facility]: [address] [facility]  Educational History:     Highest level of education:  High school graduate    Recommended Treatment:     Psychotherapy:  Individual sessions    Frequency:  2 times    Session frequency:  Monthly      Visit start and stop times:    08/21/23  Start Time: 1208  Stop Time: 1303  Total Visit Time: 55 minutes show

## 2024-04-25 NOTE — H&P ADULT - NSHPOUTPATIENTPROVIDERS_GEN_ALL_CORE
Called pt prior to procedure scheduled for 4/29/24. No answer, left message with pre-procedure instructions including arrival at [facility] admitting entrance at 1030, NPO at 0530, may take medications with sips of water, hold ASA 3 days prior.  Notified pt they must have a  post procedure and of sedation medications during procedure. Discussed post procedure protocols and expected recovery time. Instructed patient that if they need to cancel their procedure after hours to call the nursing supervisor.  Requested pt return call if he has further questions prior to procedure. No other concerns identified at this time.    
Dr Amico

## 2024-05-30 NOTE — PROGRESS NOTE ADULT - SUBJECTIVE AND OBJECTIVE BOX
Patient is a 85y old  Male who presents with a chief complaint of Acute appendicitis (04 Dec 2021 15:40)      INTERVAL /OVERNIGHT EVENTS: no further pain        REVIEW OF SYSTEMS:  CONSTITUTIONAL: No fever, weight loss, or fatigue  EYES: No eye pain, visual disturbances, or discharge  ENMT:  No difficulty hearing, tinnitus, vertigo; No sinus or throat pain  NECK: No pain or stiffness  RESPIRATORY: No cough, wheezing, chills or hemoptysis; No shortness of breath  CARDIOVASCULAR: No chest pain, palpitations, dizziness, or leg swelling  GASTROINTESTINAL: No abdominal or epigastric pain. No nausea, vomiting, or hematemesis; No diarrhea or constipation. No melena or hematochezia.  GENITOURINARY: No dysuria, frequency, hematuria, or incontinence  NEUROLOGICAL: No headaches, memory loss, loss of strength, numbness, or tremors  SKIN: No itching, burning, rashes, or lesions   LYMPH NODES: No enlarged glands  ENDOCRINE: No heat or cold intolerance; No hair loss; No polydipsia or polyuria  MUSCULOSKELETAL: No joint pain or swelling; No muscle, back, or extremity pain  PSYCHIATRIC: No depression, anxiety, mood swings, or difficulty sleeping  HEME/LYMPH: No easy bruising, or bleeding gums  ALLERGY AND IMMUNOLOGIC: No hives or eczema    PHYSICAL EXAM:  GENERAL: NAD, well-groomed, well-developed  HEAD:  Atraumatic, Normocephalic  EYES: EOMI, PERRLA, conjunctiva and sclera clear  ENMT: No tonsillar erythema, exudates, or enlargement; Moist mucous membranes, Good dentition, No lesions  NECK: Supple, No JVD, Normal thyroid  NERVOUS SYSTEM:  Alert & Oriented X3, Good concentration; Motor Strength 5/5 B/L upper and lower extremities; DTRs 2+ intact and symmetric  CHEST/LUNG: Clear to auscultation bilaterally; No rales, rhonchi, wheezing, or rubs  HEART: Regular rate and rhythm; No murmurs, rubs, or gallops  ABDOMEN: Soft, Nontender, Nondistended; Bowel sounds present  EXTREMITIES:  2+ Peripheral Pulses, No clubbing, cyanosis, or edema  LYMPH: No lymphadenopathy noted  SKIN: No rashes or lesions                            12.5   5.59  )-----------( 198      ( 04 Dec 2021 08:23 )             36.8           LIVER FUNCTIONS - ( 04 Dec 2021 08:23 )  Alb: 2.5 g/dL / Pro: 6.3 g/dL / ALK PHOS: 43 U/L / ALT: 8 U/L / AST: 21 U/L / GGT: x             144|112|22<95  4.2|28|2.00  8.9,--,--  12-05 @ 07:29      RADIOLOGY & ADDITIONAL TESTS:    Notes Reviewed:  [x ] YES  [ ] NO    Care Discussed with Consultants/Other Providers [ x] YES  [ ] NO Please advise for holding Eliquis prior to PICC line placement.

## 2024-12-23 ENCOUNTER — INPATIENT (INPATIENT)
Facility: HOSPITAL | Age: 88
LOS: 1 days | Discharge: LEFT BEFORE DISCHARGE | DRG: 149 | End: 2024-12-25
Attending: FAMILY MEDICINE | Admitting: FAMILY MEDICINE
Payer: MEDICARE

## 2024-12-23 VITALS
HEART RATE: 69 BPM | WEIGHT: 149.91 LBS | DIASTOLIC BLOOD PRESSURE: 85 MMHG | SYSTOLIC BLOOD PRESSURE: 134 MMHG | RESPIRATION RATE: 18 BRPM | HEIGHT: 60 IN | TEMPERATURE: 97 F

## 2024-12-23 DIAGNOSIS — R42 DIZZINESS AND GIDDINESS: ICD-10-CM

## 2024-12-23 LAB
ALBUMIN SERPL ELPH-MCNC: 3.8 G/DL — SIGNIFICANT CHANGE UP (ref 3.3–5)
ALP SERPL-CCNC: 81 U/L — SIGNIFICANT CHANGE UP (ref 40–120)
ALT FLD-CCNC: 11 U/L — LOW (ref 12–78)
ANION GAP SERPL CALC-SCNC: 4 MMOL/L — LOW (ref 5–17)
APTT BLD: 30.1 SEC — SIGNIFICANT CHANGE UP (ref 24.5–35.6)
AST SERPL-CCNC: 22 U/L — SIGNIFICANT CHANGE UP (ref 15–37)
BASOPHILS # BLD AUTO: 0.03 K/UL — SIGNIFICANT CHANGE UP (ref 0–0.2)
BASOPHILS NFR BLD AUTO: 0.4 % — SIGNIFICANT CHANGE UP (ref 0–2)
BILIRUB SERPL-MCNC: 0.6 MG/DL — SIGNIFICANT CHANGE UP (ref 0.2–1.2)
BUN SERPL-MCNC: 31 MG/DL — HIGH (ref 7–23)
CALCIUM SERPL-MCNC: 10.1 MG/DL — SIGNIFICANT CHANGE UP (ref 8.5–10.1)
CHLORIDE SERPL-SCNC: 109 MMOL/L — HIGH (ref 96–108)
CO2 SERPL-SCNC: 27 MMOL/L — SIGNIFICANT CHANGE UP (ref 22–31)
CREAT SERPL-MCNC: 2 MG/DL — HIGH (ref 0.5–1.3)
EGFR: 32 ML/MIN/1.73M2 — LOW
EOSINOPHIL # BLD AUTO: 0.02 K/UL — SIGNIFICANT CHANGE UP (ref 0–0.5)
EOSINOPHIL NFR BLD AUTO: 0.3 % — SIGNIFICANT CHANGE UP (ref 0–6)
GLUCOSE SERPL-MCNC: 133 MG/DL — HIGH (ref 70–99)
HCT VFR BLD CALC: 39.9 % — SIGNIFICANT CHANGE UP (ref 39–50)
HGB BLD-MCNC: 13.9 G/DL — SIGNIFICANT CHANGE UP (ref 13–17)
IMM GRANULOCYTES NFR BLD AUTO: 0.4 % — SIGNIFICANT CHANGE UP (ref 0–0.9)
INR BLD: 0.99 RATIO — SIGNIFICANT CHANGE UP (ref 0.85–1.16)
LYMPHOCYTES # BLD AUTO: 0.9 K/UL — LOW (ref 1–3.3)
LYMPHOCYTES # BLD AUTO: 11.8 % — LOW (ref 13–44)
MCHC RBC-ENTMCNC: 31.4 PG — SIGNIFICANT CHANGE UP (ref 27–34)
MCHC RBC-ENTMCNC: 34.8 G/DL — SIGNIFICANT CHANGE UP (ref 32–36)
MCV RBC AUTO: 90.3 FL — SIGNIFICANT CHANGE UP (ref 80–100)
MONOCYTES # BLD AUTO: 0.32 K/UL — SIGNIFICANT CHANGE UP (ref 0–0.9)
MONOCYTES NFR BLD AUTO: 4.2 % — SIGNIFICANT CHANGE UP (ref 2–14)
NEUTROPHILS # BLD AUTO: 6.31 K/UL — SIGNIFICANT CHANGE UP (ref 1.8–7.4)
NEUTROPHILS NFR BLD AUTO: 82.9 % — HIGH (ref 43–77)
NRBC # BLD: 0 /100 WBCS — SIGNIFICANT CHANGE UP (ref 0–0)
PLATELET # BLD AUTO: 169 K/UL — SIGNIFICANT CHANGE UP (ref 150–400)
POTASSIUM SERPL-MCNC: 3.9 MMOL/L — SIGNIFICANT CHANGE UP (ref 3.5–5.3)
POTASSIUM SERPL-SCNC: 3.9 MMOL/L — SIGNIFICANT CHANGE UP (ref 3.5–5.3)
PROT SERPL-MCNC: 7.2 G/DL — SIGNIFICANT CHANGE UP (ref 6–8.3)
PROTHROM AB SERPL-ACNC: 11.6 SEC — SIGNIFICANT CHANGE UP (ref 9.9–13.4)
RBC # BLD: 4.42 M/UL — SIGNIFICANT CHANGE UP (ref 4.2–5.8)
RBC # FLD: 12.4 % — SIGNIFICANT CHANGE UP (ref 10.3–14.5)
SODIUM SERPL-SCNC: 140 MMOL/L — SIGNIFICANT CHANGE UP (ref 135–145)
TROPONIN I, HIGH SENSITIVITY RESULT: 18.3 NG/L — SIGNIFICANT CHANGE UP
WBC # BLD: 7.61 K/UL — SIGNIFICANT CHANGE UP (ref 3.8–10.5)
WBC # FLD AUTO: 7.61 K/UL — SIGNIFICANT CHANGE UP (ref 3.8–10.5)

## 2024-12-23 PROCEDURE — 70450 CT HEAD/BRAIN W/O DYE: CPT | Mod: 26,MC

## 2024-12-23 PROCEDURE — 99285 EMERGENCY DEPT VISIT HI MDM: CPT | Mod: FS

## 2024-12-23 PROCEDURE — 93010 ELECTROCARDIOGRAM REPORT: CPT

## 2024-12-23 RX ORDER — PRAMIPEXOLE DIHYDROCHLORIDE 0.75 MG/1
1 TABLET, EXTENDED RELEASE ORAL
Refills: 0 | Status: DISCONTINUED | OUTPATIENT
Start: 2024-12-23 | End: 2024-12-25

## 2024-12-23 RX ORDER — ASPIRIN 81 MG
81 TABLET, DELAYED RELEASE (ENTERIC COATED) ORAL DAILY
Refills: 0 | Status: DISCONTINUED | OUTPATIENT
Start: 2024-12-24 | End: 2024-12-25

## 2024-12-23 RX ORDER — METOCLOPRAMIDE 10 MG/1
10 TABLET ORAL ONCE
Refills: 0 | Status: COMPLETED | OUTPATIENT
Start: 2024-12-23 | End: 2024-12-23

## 2024-12-23 RX ORDER — ENOXAPARIN SODIUM 60 MG/.6ML
30 INJECTION INTRAVENOUS; SUBCUTANEOUS EVERY 24 HOURS
Refills: 0 | Status: DISCONTINUED | OUTPATIENT
Start: 2024-12-24 | End: 2024-12-25

## 2024-12-23 RX ORDER — ONDANSETRON 4 MG/1
4 TABLET ORAL EVERY 6 HOURS
Refills: 0 | Status: DISCONTINUED | OUTPATIENT
Start: 2024-12-23 | End: 2024-12-25

## 2024-12-23 RX ORDER — ATORVASTATIN CALCIUM 40 MG/1
40 TABLET, FILM COATED ORAL AT BEDTIME
Refills: 0 | Status: DISCONTINUED | OUTPATIENT
Start: 2024-12-23 | End: 2024-12-25

## 2024-12-23 RX ORDER — MECLIZINE HYDROCHLORIDE 25 MG/1
25 TABLET ORAL ONCE
Refills: 0 | Status: COMPLETED | OUTPATIENT
Start: 2024-12-23 | End: 2024-12-23

## 2024-12-23 RX ORDER — LEVODOPA AND CARBIDOPA 145; 36.25 MG/1; MG/1
1 CAPSULE, EXTENDED RELEASE ORAL
Refills: 0 | Status: DISCONTINUED | OUTPATIENT
Start: 2024-12-23 | End: 2024-12-24

## 2024-12-23 RX ORDER — ASPIRIN 81 MG
324 TABLET, DELAYED RELEASE (ENTERIC COATED) ORAL ONCE
Refills: 0 | Status: COMPLETED | OUTPATIENT
Start: 2024-12-23 | End: 2024-12-23

## 2024-12-23 RX ORDER — SODIUM CHLORIDE 9 MG/ML
1000 INJECTION, SOLUTION INTRAMUSCULAR; INTRAVENOUS; SUBCUTANEOUS
Refills: 0 | Status: DISCONTINUED | OUTPATIENT
Start: 2024-12-23 | End: 2024-12-24

## 2024-12-23 RX ORDER — MAG HYDROX/ALUMINUM HYD/SIMETH 200-200-20
30 SUSPENSION, ORAL (FINAL DOSE FORM) ORAL EVERY 4 HOURS
Refills: 0 | Status: DISCONTINUED | OUTPATIENT
Start: 2024-12-23 | End: 2024-12-25

## 2024-12-23 RX ORDER — ACETAMINOPHEN 80 MG/.8ML
650 SOLUTION/ DROPS ORAL EVERY 6 HOURS
Refills: 0 | Status: DISCONTINUED | OUTPATIENT
Start: 2024-12-23 | End: 2024-12-25

## 2024-12-23 RX ORDER — GINKGO BILOBA 40 MG
3 CAPSULE ORAL AT BEDTIME
Refills: 0 | Status: DISCONTINUED | OUTPATIENT
Start: 2024-12-23 | End: 2024-12-25

## 2024-12-23 RX ORDER — SODIUM CHLORIDE 9 MG/ML
1000 INJECTION, SOLUTION INTRAMUSCULAR; INTRAVENOUS; SUBCUTANEOUS ONCE
Refills: 0 | Status: COMPLETED | OUTPATIENT
Start: 2024-12-23 | End: 2024-12-23

## 2024-12-23 RX ADMIN — METOCLOPRAMIDE 10 MILLIGRAM(S): 10 TABLET ORAL at 15:20

## 2024-12-23 RX ADMIN — SODIUM CHLORIDE 80 MILLILITER(S): 9 INJECTION, SOLUTION INTRAMUSCULAR; INTRAVENOUS; SUBCUTANEOUS at 22:01

## 2024-12-23 RX ADMIN — SODIUM CHLORIDE 1000 MILLILITER(S): 9 INJECTION, SOLUTION INTRAMUSCULAR; INTRAVENOUS; SUBCUTANEOUS at 15:20

## 2024-12-23 RX ADMIN — ATORVASTATIN CALCIUM 40 MILLIGRAM(S): 40 TABLET, FILM COATED ORAL at 22:03

## 2024-12-23 RX ADMIN — Medication 324 MILLIGRAM(S): at 17:55

## 2024-12-23 RX ADMIN — MECLIZINE HYDROCHLORIDE 25 MILLIGRAM(S): 25 TABLET ORAL at 15:20

## 2024-12-23 NOTE — H&P ADULT - HISTORY OF PRESENT ILLNESS
This is a88-year-old male with history of hypertension, hyperlipidemia, asthma, shingles, Parkinson's/dementia presents emergency room with episodic dizziness since yesterday.  Wife states he stood up and felt dizzy.  Felt like the room was spinning.  Symptoms were intermittent yesterday.  Went to urgent care and had ears cleaned.  Recommend follow-up with ENT.  Wife states he had appointment with ENT this morning but canceled it due to symptoms resolved.  Symptoms returned this afternoon.  Denies any current pain or symptoms.  Denies headache, current dizziness, one-sided weakness.  Wife reports history of vertigo 20 years ago and symptoms similar in quality and character.  No injury or trauma. Symptoms worse with movement of head

## 2024-12-23 NOTE — ED ADULT NURSE NOTE - NSICDXPASTMEDICALHX_GEN_ALL_CORE_FT
Right atrium lead inserted into generator. PAST MEDICAL HISTORY:  HTN (hypertension)     Hypercholesteremia     Mild asthma     Parkinson disease     Shingles

## 2024-12-23 NOTE — ED PROVIDER NOTE - PROGRESS NOTE DETAILS
Patient stable.  CAT scan results discussed with patient and wife.  Unable to perform CTA due to renal insufficiency.  CAT scan shows small basal ganglia and thalamus remote lacunar infarcts, unsure if acute.  Out of window for TNK. Due to symptoms of dizziness will admit for further evaluation.  Spoke with Dr. RAJNI Mijares and accept patient under Dr. Torres

## 2024-12-23 NOTE — ED ADULT TRIAGE NOTE - CHIEF COMPLAINT QUOTE
intermittent dizziness since yesterday, woke from sleep at 0730 dizzy. pt seen at urgent care yesterday had ears cleaned and was told to follow up with ENT for vertigo. pt presents today as wife states" he got dizzy again and then it went away. he has parkinson's". hx of vertigo 20 years ago

## 2024-12-23 NOTE — ED ADULT NURSE NOTE - NSFALLHARMRISKINTERV_ED_ALL_ED

## 2024-12-23 NOTE — PATIENT PROFILE ADULT - FALL HARM RISK - HARM RISK INTERVENTIONS
Assistance with ambulation/Assistance OOB with selected safe patient handling equipment/Communicate Risk of Fall with Harm to all staff/Discuss with provider need for PT consult/Monitor gait and stability/Reinforce activity limits and safety measures with patient and family/Sit up slowly, dangle for a short time, stand at bedside before walking/Tailored Fall Risk Interventions/Visual Cue: Yellow wristband and red socks/Bed in lowest position, wheels locked, appropriate side rails in place/Call bell, personal items and telephone in reach/Instruct patient to call for assistance before getting out of bed or chair/Non-slip footwear when patient is out of bed/[place] to call system/Physically safe environment - no spills, clutter or unnecessary equipment/Purposeful Proactive Rounding/Room/bathroom lighting operational, light cord in reach

## 2024-12-23 NOTE — ED PROVIDER NOTE - CLINICAL SUMMARY MEDICAL DECISION MAKING FREE TEXT BOX
Patient is a 88-year-old male who presents to the emergency room with a chief complaint of dizziness.  Past medical history of hypertension hyperlipidemia asthma shingles Parkinsons dementia.  Patient presents to the emergency room with intermittent dizziness since yesterday.  Overall patient is a poor historian and so history is somewhat limited.  Wife reports that yesterday upon waking up he reported he was dizzy and his gait was slightly off.  Patient has since intermittently felt these episodes.  He was seen in urgent care his ears were cleaned and it was recommended that he follow-up with ENT.  He had an appointment this morning with wife canceled but she felt he was better symptoms returned again this afternoon.  Denies any fevers but does report chills.  Denies any nausea vomiting chest pain or shortness of breath.  On exam patient is lying in bed mild facial droop is noted to the right side heart regular rate lungs clear to auscultation abdomen soft nontender nondistended.  There is a audible cardiac murmur noted.  TMs clear bilaterally.  Patient is able to ambulate with a slightly ataxic gait.  Patient is presenting to the emergency room with intermittent dizziness since yesterday.  Differential is broad includes possible benign positional vertigo versus TIA versus CVA.  It is unclear whether the symptoms ever completely resolved and given patient's dementia did not accurately ascertain this therefore last known well would be considered yesterday making him outside the tenecteplase window.  Will obtain screening labs CT head hydrate medicate for symptoms and monitor.  Ultimate clinical disposition will be pending results.  Independent review of EKG reveals a normal sinus rhythm with a right bundle branch block and a left anterior fascicular block at 67 bpm.  Independent review of CT imaging reveals small basal ganglia and thalamic remote will admit at this time for stroke workup.  Infarcts

## 2024-12-23 NOTE — PATIENT PROFILE ADULT - NSPROMEDSBROUGHTTOHOSP_GEN_A_NUR
Influenza , 2015/2/19 13:41 , Jesus Zaragoza (RN)  Influenza , 2015/10/16 10:47 , Marce Purcell (RN)  Influenza , 2017/9/21 10:59 , Piedad Swift (RN)  Influenza , 2018/12/7 14:39 , Ken House (RN) no

## 2024-12-23 NOTE — ED ADULT NURSE NOTE - OBJECTIVE STATEMENT
"Progress Notes by Betty Carreno CMA at 6/17/2020  8:00 AM     Author: Betty Carreno CMA Service: -- Author Type: Certified Medical Assistant    Filed: 6/17/2020  8:44 AM Date of Service: 6/17/2020  8:00 AM Status: Signed    : Betty Carreno CMA (Certified Medical Assistant)       Pt is being treated today via telephone visit with Leonor Ramirez CNP, for refills and f/u of back and neck pain.    Cynthia Lyons is a 49 y.o. female who is being evaluated via a billable telephone visit.      The patient has been notified of following:     \"This telephone visit will be conducted via a call between you and your physician/provider. We have found that certain health care needs can be provided without the need for a physical exam.  This service lets us provide the care you need with a short phone conversation.  If a prescription is necessary we can send it directly to your pharmacy.  If lab work is needed we can place an order for that and you can then stop by our lab to have the test done at a later time.    Telephone visits are billed at different rates depending on your insurance coverage. During this emergency period, for some insurers they may be billed the same as an in-person visit.  Please reach out to your insurance provider with any questions.    If during the course of the call the physician/provider feels a telephone visit is not appropriate, you will not be charged for this service.\"    Patient has given verbal consent to a Telephone visit? Yes    What phone number would you like to be contacted at? [phone]    Patient would like to receive their AVS by AVS Preference: Karri.     Pain score 7  Constant   What does your pain like feel during a flare? Dull ache  Does the pain interfere with:  Work ----- NA  Walking ------ yes  Sleep ------- yes  Daily activities ------yes  Relationships -------yes  F=7          Betty Carreno CMA           " pt is at baseline as per wife at bedside. pt brought into the ED with c/o intermittent dizziness since yesterday, Pt states he woke from sleep  dizzy. pt seen at urgent care yesterday had ears cleaned and was told to follow up with ENT for vertigo. pt presents today as wife states" he got dizzy again and then it went away. he has parkinson's". hx of vertigo 20 years ago as per wife. pt noted with unsteady gait due to parkinsons. able to swallow. speech is clear. no  neuro deficits noted. care ongoing. call bell placed within teach.

## 2024-12-23 NOTE — ED PROVIDER NOTE - OBJECTIVE STATEMENT
88-year-old male with history of hypertension, hyperlipidemia, asthma, shingles, Parkinson's/dementia presents emergency room with episodic dizziness since yesterday.  Wife states he stood up and felt dizzy.  Felt like the room was spinning.  Symptoms were intermittent yesterday.  Went to urgent care and had ears cleaned.  Recommend follow-up with ENT.  Wife states he had appointment with ENT this morning but canceled it due to symptoms resolved.  Symptoms returned this afternoon.  Denies any current pain or symptoms.  Denies headache, current dizziness, one-sided weakness.  Wife reports history of vertigo 20 years ago and symptoms similar in quality and character.  No injury or trauma. Symptoms worse with movement of head   PCP Ruy Garcia

## 2024-12-23 NOTE — ED PROVIDER NOTE - DIFFERENTIAL DIAGNOSIS
Differentials include but not limited to benign positional vertigo, mass, ICH, dehydration, electrolyte abnormality Differential Diagnosis

## 2024-12-23 NOTE — H&P ADULT - PROBLEM SELECTOR PLAN 1
CT shows ?basal ganglia and thalamic infarcts  Admit to tele  aspirin 81mg qd  neuro check q4h  MRI/MRA brain  carotid u/s  2d echo  lipid profile, A1C  physical therapy  neuro consult   Further work-up/management pending clinical course.

## 2024-12-23 NOTE — ED PROVIDER NOTE - NEUROLOGICAL, MLM
Alert and awake. Symmetric eyebrow raise and smile.  Good  strength bilaterally.  Negative pronator drift

## 2024-12-24 ENCOUNTER — RESULT REVIEW (OUTPATIENT)
Age: 88
End: 2024-12-24

## 2024-12-24 DIAGNOSIS — N17.9 ACUTE KIDNEY FAILURE, UNSPECIFIED: ICD-10-CM

## 2024-12-24 DIAGNOSIS — R42 DIZZINESS AND GIDDINESS: ICD-10-CM

## 2024-12-24 LAB
ANION GAP SERPL CALC-SCNC: 7 MMOL/L — SIGNIFICANT CHANGE UP (ref 5–17)
BUN SERPL-MCNC: 23 MG/DL — SIGNIFICANT CHANGE UP (ref 7–23)
CALCIUM SERPL-MCNC: 9.7 MG/DL — SIGNIFICANT CHANGE UP (ref 8.5–10.1)
CHLORIDE SERPL-SCNC: 113 MMOL/L — HIGH (ref 96–108)
CO2 SERPL-SCNC: 24 MMOL/L — SIGNIFICANT CHANGE UP (ref 22–31)
CREAT SERPL-MCNC: 1.7 MG/DL — HIGH (ref 0.5–1.3)
EGFR: 38 ML/MIN/1.73M2 — LOW
GLUCOSE SERPL-MCNC: 96 MG/DL — SIGNIFICANT CHANGE UP (ref 70–99)
HCT VFR BLD CALC: 38.4 % — LOW (ref 39–50)
HGB BLD-MCNC: 13.8 G/DL — SIGNIFICANT CHANGE UP (ref 13–17)
MAGNESIUM SERPL-MCNC: 2.1 MG/DL — SIGNIFICANT CHANGE UP (ref 1.6–2.6)
MCHC RBC-ENTMCNC: 32.8 PG — SIGNIFICANT CHANGE UP (ref 27–34)
MCHC RBC-ENTMCNC: 35.9 G/DL — SIGNIFICANT CHANGE UP (ref 32–36)
MCV RBC AUTO: 91.2 FL — SIGNIFICANT CHANGE UP (ref 80–100)
NRBC # BLD: 0 /100 WBCS — SIGNIFICANT CHANGE UP (ref 0–0)
PLATELET # BLD AUTO: 178 K/UL — SIGNIFICANT CHANGE UP (ref 150–400)
POTASSIUM SERPL-MCNC: 3.8 MMOL/L — SIGNIFICANT CHANGE UP (ref 3.5–5.3)
POTASSIUM SERPL-SCNC: 3.8 MMOL/L — SIGNIFICANT CHANGE UP (ref 3.5–5.3)
RBC # BLD: 4.21 M/UL — SIGNIFICANT CHANGE UP (ref 4.2–5.8)
RBC # FLD: 12.4 % — SIGNIFICANT CHANGE UP (ref 10.3–14.5)
SODIUM SERPL-SCNC: 144 MMOL/L — SIGNIFICANT CHANGE UP (ref 135–145)
WBC # BLD: 6.42 K/UL — SIGNIFICANT CHANGE UP (ref 3.8–10.5)
WBC # FLD AUTO: 6.42 K/UL — SIGNIFICANT CHANGE UP (ref 3.8–10.5)

## 2024-12-24 PROCEDURE — 93880 EXTRACRANIAL BILAT STUDY: CPT | Mod: 26

## 2024-12-24 PROCEDURE — 93306 TTE W/DOPPLER COMPLETE: CPT | Mod: 26

## 2024-12-24 RX ORDER — B COMPLEX, C NO.20/FOLIC ACID 1 MG
1 CAPSULE ORAL
Refills: 0 | DISCHARGE

## 2024-12-24 RX ORDER — SODIUM CHLORIDE 9 MG/ML
1000 INJECTION, SOLUTION INTRAVENOUS
Refills: 0 | Status: DISCONTINUED | OUTPATIENT
Start: 2024-12-24 | End: 2024-12-25

## 2024-12-24 RX ORDER — LEVODOPA AND CARBIDOPA 145; 36.25 MG/1; MG/1
1 CAPSULE, EXTENDED RELEASE ORAL
Refills: 0 | Status: DISCONTINUED | OUTPATIENT
Start: 2024-12-24 | End: 2024-12-25

## 2024-12-24 RX ADMIN — PRAMIPEXOLE DIHYDROCHLORIDE 1 MILLIGRAM(S): 0.75 TABLET, EXTENDED RELEASE ORAL at 07:58

## 2024-12-24 RX ADMIN — PRAMIPEXOLE DIHYDROCHLORIDE 1 MILLIGRAM(S): 0.75 TABLET, EXTENDED RELEASE ORAL at 18:55

## 2024-12-24 RX ADMIN — LEVODOPA AND CARBIDOPA 1 TABLET(S): 145; 36.25 CAPSULE, EXTENDED RELEASE ORAL at 20:00

## 2024-12-24 RX ADMIN — ENOXAPARIN SODIUM 30 MILLIGRAM(S): 60 INJECTION INTRAVENOUS; SUBCUTANEOUS at 07:58

## 2024-12-24 RX ADMIN — LEVODOPA AND CARBIDOPA 1 TABLET(S): 145; 36.25 CAPSULE, EXTENDED RELEASE ORAL at 07:58

## 2024-12-24 RX ADMIN — ATORVASTATIN CALCIUM 40 MILLIGRAM(S): 40 TABLET, FILM COATED ORAL at 21:38

## 2024-12-24 RX ADMIN — SODIUM CHLORIDE 60 MILLILITER(S): 9 INJECTION, SOLUTION INTRAVENOUS at 21:40

## 2024-12-24 RX ADMIN — Medication 81 MILLIGRAM(S): at 14:43

## 2024-12-24 RX ADMIN — LEVODOPA AND CARBIDOPA 1 TABLET(S): 145; 36.25 CAPSULE, EXTENDED RELEASE ORAL at 18:10

## 2024-12-24 NOTE — CASE MANAGEMENT PROGRESS NOTE - NSCMPROGRESSNOTE_GEN_ALL_CORE
CM spoke with Matt, at [facility] ([phone]), informed of possible DC today after MRI. Once DC confirmed, DC summary should be faxed to . Per Matt, they will authorize reinstatement of aide services. Per patient's spouse, Chloe, aide is on vacation; she and family are prepared to take patient home without aide in place and confirmed they are able to care for patient. Referral sent to [facility], pending acceptance. CM will continue to follow.

## 2024-12-24 NOTE — CARE COORDINATION ASSESSMENT. - NSPASTMEDSURGHISTORY_GEN_ALL_CORE_FT
PAST MEDICAL & SURGICAL HISTORY:  HTN (hypertension)      Shingles      Parkinson disease      Mild asthma      Hypercholesteremia      No significant past surgical history

## 2024-12-24 NOTE — CONSULT NOTE ADULT - SUBJECTIVE AND OBJECTIVE BOX
[facility]  Dr. Hutton, Dr. Rivera, Dr. Ordoñez, Dr. Bro, Dr. Prado, Dr. Yates                                        [facility], [facility], [address]                                                 [address] [place], [place] [address] [place] [id number]  Ph: [phone]  Fax: [phone]                                         Ph: [phone]  Fax: [phone]      Patient is a 88y old  Male who presents with a chief complaint of dizziness (24 Dec 2024 11:18)    HPI:  This is a 88-year-old male with history of hypertension, hyperlipidemia, asthma, shingles, Parkinson's/dementia presents emergency room with episodic dizziness since yesterday.  Wife states he stood up and felt dizzy.  Felt like the room was spinning.  Symptoms were intermittent yesterday.  Went to urgent care and had ears cleaned.  Recommend follow-up with ENT.  Wife states he had appointment with ENT this morning but canceled it due to symptoms resolved.  Symptoms returned this afternoon.  Denies any current pain or symptoms.  Denies headache, current dizziness, one-sided weakness.  Wife reports history of vertigo 20 years ago and symptoms similar in quality and character.  No injury or trauma. Symptoms worse with movement of head  (23 Dec 2024 22:16)    Renal consult called for SUSANA. History obtained from chart and patient.       PAST MEDICAL HISTORY:  Parkinson disease    Shingles    HTN (hypertension)    Hypercholesteremia    Mild asthma        PAST SURGICAL HISTORY:  No significant past surgical history        FAMILY HISTORY: No smoking or alcohol use       SOCIAL HISTORY:    Allergies    No Known Allergies    Intolerances      Home Medications:  amLODIPine 2.5 mg oral tablet: 1 tab(s) orally once a day (in the morning) (24 Dec 2024 13:24)  atorvastatin 10 mg oral tablet: 1 tab(s) orally once a day (24 Dec 2024 13:24)  calcitriol 0.25 mcg oral capsule: 1 cap(s) orally every other day (24 Dec 2024 13:24)  carbidopa-levodopa 25 mg-100 mg oral tablet, extended release: 1.5 tab(s) orally 4 times a day at 8a, 12p, 6p, 10p (24 Dec 2024 13:22)  furosemide 40 mg oral tablet: 1 tab(s) orally every other day (24 Dec 2024 13:25)  pramipexole 1 mg oral tablet: 1 tab(s) orally 3 times a day at 8a 12p, 6p (24 Dec 2024 13:24)  Chaya-Roselyn oral tablet: 1 tab(s) orally once a day (24 Dec 2024 13:24)  rivastigmine 4.6 mg/24 hr transdermal film, extended release: 1 patch transdermal once a day (24 Dec 2024 13:24)  VESIcare 5 mg oral tablet: 1 tab(s) orally once a day (24 Dec 2024 13:24)    MEDICATIONS  (STANDING):  aspirin enteric coated 81 milliGRAM(s) Oral daily  atorvastatin 40 milliGRAM(s) Oral at bedtime  carbidopa/levodopa CR 25/100 1 Tablet(s) Oral <User Schedule>  enoxaparin Injectable 30 milliGRAM(s) SubCutaneous every 24 hours  pramipexole 1 milliGRAM(s) Oral <User Schedule>  sodium chloride 0.9%. 1000 milliLiter(s) (80 mL/Hr) IV Continuous <Continuous>    MEDICATIONS  (PRN):  acetaminophen     Tablet .. 650 milliGRAM(s) Oral every 6 hours PRN Temp greater or equal to 38C (100.4F), Mild Pain (1 - 3)  aluminum hydroxide/magnesium hydroxide/simethicone Suspension 30 milliLiter(s) Oral every 4 hours PRN Dyspepsia  melatonin 3 milliGRAM(s) Oral at bedtime PRN Insomnia  ondansetron Injectable 4 milliGRAM(s) IV Push every 6 hours PRN Nausea and/or Vomiting      REVIEW OF SYSTEMS:  General: no distress  Respiratory: No cough, SOB  Cardiovascular: No CP or Palpitations	  Gastrointestinal: No nausea, Vomiting. No diarrhea  Genitourinary: No urinary complaints	  Musculoskeletal: No new rash or lesions	  all other systems negative    T(F): 97.5 (12-24-24 @ 11:08), Max: 99.1 (12-23-24 @ 22:00)  HR: 65 (12-24-24 @ 11:08) (65 - 102)  BP: 155/92 (12-24-24 @ 11:08) (147/95 - 166/90)  RR: 18 (12-24-24 @ 11:08) (18 - 19)  SpO2: 95% (12-24-24 @ 11:08) (94% - 100%)  Wt(kg): --    PHYSICAL EXAM:  General: NAD  Respiratory: b/l air entry  Cardiovascular: S1 S2  Gastrointestinal: soft  Extremities: no edema        12-24    144  |  113[H]  |  23  ----------------------------<  96  3.8   |  24  |  1.70[H]    Ca    9.7      24 Dec 2024 08:15  Mg     2.1     12-24    TPro  7.2  /  Alb  3.8  /  TBili  0.6  /  DBili  x   /  AST  22  /  ALT  11[L]  /  AlkPhos  81  12-23                          13.8   6.42  )-----------( 178      ( 24 Dec 2024 08:15 )             38.4       Potassium: 3.8 mmol/L (12-24 @ 08:15)  Blood Urea Nitrogen: 23 mg/dL (12-24 @ 08:15)  Calcium: 9.7 mg/dL (12-24 @ 08:15)  Hemoglobin: 13.8 g/dL (12-24 @ 08:15)      Creatinine, Serum: 1.70 (12-24 @ 08:15)  Creatinine, Serum: 2.00 (12-23 @ 15:15)      Urinalysis Basic - ( 24 Dec 2024 08:15 )    Color: x / Appearance: x / SG: x / pH: x  Gluc: 96 mg/dL / Ketone: x  / Bili: x / Urobili: x   Blood: x / Protein: x / Nitrite: x   Leuk Esterase: x / RBC: x / WBC x   Sq Epi: x / Non Sq Epi: x / Bacteria: x      LIVER FUNCTIONS - ( 23 Dec 2024 15:15 )  Alb: 3.8 g/dL / Pro: 7.2 g/dL / ALK PHOS: 81 U/L / ALT: 11 U/L / AST: 22 U/L / GGT: x                     < from: CT Head No Cont (12.23.24 @ 15:39) >    ACC: [id number] EXAM:  CT BRAIN   ORDERED BY: SHANNEN MENDOZA     PROCEDURE DATE:  12/23/2024          INTERPRETATION:  CLINICAL INFORMATION: dizziness    COMPARISON: None.    CONTRAST:  IV Contrast: NONE  .          CT head without IV contrast    CLINICAL INFORMATION:     dizziness  PCT    TECHNIQUE:  Contiguous axial 3 mm thick images were acquired.  This data   set was reconstructed in the sagittal and coronal planes.  Contrast was   not administered for this examination.  CONTRAST:    None  DOSE INFORMATION:   This scan was performed using automatic exposure   control (radiation dose reduction software) to obtain a diagnostic image   quality scan with patient dose as low as reasonably achievable.   Total   DLP for this examination is estimated at 842 mGy*cm.    COMPARISON:   CT head 2020    FINDINGS:    BRAIN:    The brain  demonstrates patchy indistinct lesions of diminished   attenuation in the cerebral hemispheric white matter to suggest ischemic   white matter disease. This may bemost extensive in the periventricular   white matter. Superimposed are small focal lesions of remote deep   infarction, most prominently in the left caudate nucleus head, right   putamen and left thalamus. No cerebral cortical lesion is recognized.  Cerebral cortical gray-white matter differentiation is maintained.  No   acute cerebral cortical infarct is seen.  No intracranial hemorrhage is   found.  No mass effect is found in the brain.    CSF SPACES:  The ventricles, sulci and basal cisterns appear mild to   moderately dilated reflecting diffuse brain volume loss.  The internal   auditory canals appear intact, without osseous expansion or soft tissue   mass lesion.  The 7th and 8th cranial nerves appear intact, allowing for   this technique.   Inner ear structures appear normally formed.    VESSELS:   Intracranial vasculature is also significant for   atherosclerotic calcifications extensively involving the clinoid segments   of the internal carotid arteries. There is also involvement of each   vertebral artery and the basilar artery.    HEAD AND NECK STRUCTURES:  The orbits are unremarkable.  The included   paranasal sinuses  are clear.  The nasal septum and shallow deviation   right to left narrowing the left naris.  The nasopharynx is symmetric.    The central skull base is intact.  The temporal bones demonstrate patent   petrous air cells.  The calvarium appears unremarkable.      IMPRESSION:    1. Small basal ganglia and thalamic remote lacunar infarctions    2. Ischemic white matter disease and atrophy typical for age    3. Intracranial atherosclerosis    --- End of Report ---            CORINA CHRISTENSEN MD; Attending Radiologist  This document has been electronically signed. Dec 23 2024  3:56PM    < end of copied text >

## 2024-12-24 NOTE — CARE COORDINATION ASSESSMENT. - NSCAREPROVIDERS_GEN_ALL_CORE_FT
CARE PROVIDERS:  Accepting Physician: Chay Torres  Access Services: Alek Platt  Admitting: Chay Torres  Attending: Chay Torres  Consultant: Tucker Prado  Consultant: Henry Hutton  Consultant: Vicente Silva  Consultant: Jeremy Aguirre  Covering Team: Vicente Silva  Covering Team: Edison Mijares  ED ACP: Monique Rodriguez  ED Attending: Cate Lacy ED Nurse: Elmer Kessler  Nurse: Siobhan Mcdonnell  Nurse: Aric Chan  Nurse: Lilliana Carroll  Outpatient Provider: Amico, Frank  Outpatient Provider: Momo Rivera  Outpatient Provider: Henry Hutton  Override: Yulisa Whitfield  PCA/Nursing Assistant: Mary Miller  PCA/Nursing Assistant: Ivon Renee  PCA/Nursing Assistant: Tomasa Castillo  Physical Therapy: Lawrence Reveles  Physical Therapy: Mallory Mccray  Registered Dietitian: Trudi Sanchez  : Rio Velasquez   Implemented All Universal Safety Interventions:  [place] to call system. Call bell, personal items and telephone within reach. Instruct patient to call for assistance. Room bathroom lighting operational. Non-slip footwear when patient is off stretcher. Physically safe environment: no spills, clutter or unnecessary equipment. Stretcher in lowest position, wheels locked, appropriate side rails in place.

## 2024-12-24 NOTE — DISCHARGE NOTE NURSING/CASE MANAGEMENT/SOCIAL WORK - NSDCPEFALRISK_GEN_ALL_CORE
For information on Fall & Injury Prevention, visit: [email or web address] OR  [email or web address] OR  https://www.cdc.gov/steadi/patient.html

## 2024-12-24 NOTE — PHYSICAL THERAPY INITIAL EVALUATION ADULT - ADDITIONAL COMMENTS
Pt lives with wife in a private house Pt lives with wife in a private house, has an Aide 8 hours a day

## 2024-12-24 NOTE — PHYSICAL THERAPY INITIAL EVALUATION ADULT - PATIENT/FAMILY/SIGNIFICANT OTHER GOALS STATEMENT, PT EVAL
Pt able to follow simple commands Pt able to follow simple commands, family reports Patient is at baseline has an Aide for 8 hours

## 2024-12-24 NOTE — DISCHARGE NOTE NURSING/CASE MANAGEMENT/SOCIAL WORK - FINANCIAL ASSISTANCE
[facility] provides services at a reduced cost to those who are determined to be eligible through [facility]’s financial assistance program. Information regarding [facility]’s financial assistance program can be found by going to [email or web address] or by calling [phone].

## 2024-12-24 NOTE — DISCHARGE NOTE NURSING/CASE MANAGEMENT/SOCIAL WORK - PATIENT PORTAL LINK FT
You can access the FollowMyHealth Patient Portal offered by [facility] by registering at the following website: [email or web address]. By joining [facility]’s FollowMyHealth portal, you will also be able to view your health information using other applications (apps) compatible with our system.

## 2024-12-24 NOTE — CARE COORDINATION ASSESSMENT. - NSDCPLANSERVICES_GEN_ALL_CORE
Pt is an alert and oriented male admitted for dizziness. Pt admitted for r/o stroke. Pt has parkinson's and used a rolling walker pta. family states they prefer pt return daya with an aide through [facility]. 8 hrs x 7 days instead of subacute rehab. Pt requires assistance with adls pta./Home Care/Subacute Rehabilitation

## 2024-12-24 NOTE — CAREGIVER ENGAGEMENT NOTE - CAREGIVER OUTREACH NOTES - FREE TEXT
CM spoke with patient's spouse, Chloe, to discuss transitional care plan. Anticipated for DC home with home PT when medically stable. Spouse chose [facility]. She confirmed patient has 8 hrs/day [facility] aide via [employer] ([phone]), but that the aide is on vacation. She is prepared to take patient home when medically stable, without aide, stating that she and her family can care for patient while aide is away. Family to transport home.

## 2024-12-24 NOTE — PHYSICAL THERAPY INITIAL EVALUATION ADULT - PERTINENT HX OF CURRENT PROBLEM, REHAB EVAL
This is a88-year-old male with history of hypertension, hyperlipidemia, asthma, shingles, Parkinson's/dementia presents emergency room with episodic dizziness since yesterday.  Wife states he stood up and felt dizzy.  Felt like the room was spinning.  Symptoms were intermittent.  Went to urgent care and had ears cleaned.  Recommend follow-up with ENT.  Wife states he had appointment with ENT  but canceled it due to symptoms resolved.  Symptoms returned then returned.  Wife reports history of vertigo 20 years ago and symptoms similar in quality and character.

## 2024-12-24 NOTE — CONSULT NOTE ADULT - ASSESSMENT
CKD 3 (Crea ~ 1.7 in 2021)  Hypertension  Dizziness  Parkinsons    Renal indices at baseline. Hold lasix. Monitor creatinine trend post IV contrast study. Trend BP and titrate BP meds as needed.   Neurology work up. Avoid nephrotoxic meds as possible. Will follow electrolytes and renal function trend.     Further recommendations pending clinical course. Thank you for the courtesy of this referral.

## 2024-12-25 ENCOUNTER — TRANSCRIPTION ENCOUNTER (OUTPATIENT)
Age: 88
End: 2024-12-25

## 2024-12-25 VITALS
TEMPERATURE: 98 F | RESPIRATION RATE: 18 BRPM | SYSTOLIC BLOOD PRESSURE: 161 MMHG | HEART RATE: 69 BPM | WEIGHT: 145.51 LBS | DIASTOLIC BLOOD PRESSURE: 97 MMHG | OXYGEN SATURATION: 95 %

## 2024-12-25 LAB
ANION GAP SERPL CALC-SCNC: 8 MMOL/L — SIGNIFICANT CHANGE UP (ref 5–17)
BUN SERPL-MCNC: 28 MG/DL — HIGH (ref 7–23)
CALCIUM SERPL-MCNC: 9.1 MG/DL — SIGNIFICANT CHANGE UP (ref 8.5–10.1)
CHLORIDE SERPL-SCNC: 111 MMOL/L — HIGH (ref 96–108)
CO2 SERPL-SCNC: 23 MMOL/L — SIGNIFICANT CHANGE UP (ref 22–31)
CREAT SERPL-MCNC: 1.5 MG/DL — HIGH (ref 0.5–1.3)
EGFR: 44 ML/MIN/1.73M2 — LOW
GLUCOSE SERPL-MCNC: 83 MG/DL — SIGNIFICANT CHANGE UP (ref 70–99)
POTASSIUM SERPL-MCNC: 3.9 MMOL/L — SIGNIFICANT CHANGE UP (ref 3.5–5.3)
POTASSIUM SERPL-SCNC: 3.9 MMOL/L — SIGNIFICANT CHANGE UP (ref 3.5–5.3)
SODIUM SERPL-SCNC: 142 MMOL/L — SIGNIFICANT CHANGE UP (ref 135–145)

## 2024-12-25 PROCEDURE — 85027 COMPLETE CBC AUTOMATED: CPT

## 2024-12-25 PROCEDURE — 93880 EXTRACRANIAL BILAT STUDY: CPT

## 2024-12-25 PROCEDURE — 99285 EMERGENCY DEPT VISIT HI MDM: CPT

## 2024-12-25 PROCEDURE — 97162 PT EVAL MOD COMPLEX 30 MIN: CPT

## 2024-12-25 PROCEDURE — 93306 TTE W/DOPPLER COMPLETE: CPT | Mod: 26

## 2024-12-25 PROCEDURE — 93306 TTE W/DOPPLER COMPLETE: CPT

## 2024-12-25 PROCEDURE — 96374 THER/PROPH/DIAG INJ IV PUSH: CPT

## 2024-12-25 PROCEDURE — 85730 THROMBOPLASTIN TIME PARTIAL: CPT

## 2024-12-25 PROCEDURE — 93005 ELECTROCARDIOGRAM TRACING: CPT

## 2024-12-25 PROCEDURE — 80048 BASIC METABOLIC PNL TOTAL CA: CPT

## 2024-12-25 PROCEDURE — 80053 COMPREHEN METABOLIC PANEL: CPT

## 2024-12-25 PROCEDURE — 83735 ASSAY OF MAGNESIUM: CPT

## 2024-12-25 PROCEDURE — 85025 COMPLETE CBC W/AUTO DIFF WBC: CPT

## 2024-12-25 PROCEDURE — 36415 COLL VENOUS BLD VENIPUNCTURE: CPT

## 2024-12-25 PROCEDURE — 70450 CT HEAD/BRAIN W/O DYE: CPT | Mod: MC

## 2024-12-25 PROCEDURE — 84484 ASSAY OF TROPONIN QUANT: CPT

## 2024-12-25 PROCEDURE — 85610 PROTHROMBIN TIME: CPT

## 2024-12-25 RX ORDER — FUROSEMIDE 20 MG
1 TABLET ORAL
Refills: 0 | DISCHARGE

## 2024-12-25 RX ADMIN — LEVODOPA AND CARBIDOPA 1 TABLET(S): 145; 36.25 CAPSULE, EXTENDED RELEASE ORAL at 08:27

## 2024-12-25 RX ADMIN — PRAMIPEXOLE DIHYDROCHLORIDE 1 MILLIGRAM(S): 0.75 TABLET, EXTENDED RELEASE ORAL at 08:28

## 2024-12-25 RX ADMIN — ENOXAPARIN SODIUM 30 MILLIGRAM(S): 60 INJECTION INTRAVENOUS; SUBCUTANEOUS at 06:03

## 2024-12-25 NOTE — DISCHARGE NOTE PROVIDER - HOSPITAL COURSE
This is a88-year-old male with history of hypertension, hyperlipidemia, asthma, shingles, Parkinson's/dementia presents emergency room with episodic dizziness since yesterday.  Wife states he stood up and felt dizzy.  Felt like the room was spinning.  Symptoms were intermittent yesterday.  Went to urgent care and had ears cleaned.  Recommend follow-up with ENT.  Wife states he had appointment with ENT this morning but canceled it due to symptoms resolved.  Symptoms returned this afternoon.  Denies any current pain or symptoms.  Denies headache, current dizziness, one-sided weakness.  Wife reports history of vertigo 20 years ago and symptoms similar in quality and character.  No injury or trauma. Symptoms worse with movement of head      Problem/Plan - 1:  ·  Problem: Dizziness.   ·  Plan: CT shows ?basal ganglia and thalamic infarcts -- likely old  aspirin 81mg qd  neuro check q4h  MRI/MRA brain  carotid u/s - no JHONY  2d echo - normal EF, mild-mod AS  physical therapy  neuro fu  Further work-up/management pending clinical course.     Problem/Plan - 2:  ·  Problem: SUSANA (acute kidney injury).   ·  Plan: IVF  Monitor BMP -- improving  Renal f/u  Further work-up/management pending clinical course.     Problem/Plan - 3:  ·  Problem: Parkinson disease.   ·  Plan: Continue Sinemet.       Additional Information:  Additional Information: Spoke to family at bedside  They want to sign out patient AMA. They don't want to wait for MRI. They understand the risks. They said they will f/u as outpatient with their PCP and arrange for an outpatient MRI.  >35 minutes spent on discharge

## 2024-12-25 NOTE — PROGRESS NOTE ADULT - SUBJECTIVE AND OBJECTIVE BOX
Date of Service: 12-25-24 @ 21:06    Patient is a 89y old  Male who presents with a chief complaint of dizziness (24 Dec 2024 13:50)      INTERVAL HPI/OVERNIGHT EVENTS: Patient seen and examined. NAD. No complaints.    Vital Signs Last 24 Hrs  T(C): 36.7 (25 Dec 2024 05:08), Max: 36.7 (25 Dec 2024 05:08)  T(F): 98 (25 Dec 2024 05:08), Max: 98 (25 Dec 2024 05:08)  HR: 69 (25 Dec 2024 05:08) (69 - 69)  BP: 161/97 (25 Dec 2024 05:08) (161/97 - 161/97)  BP(mean): --  RR: 18 (25 Dec 2024 05:08) (18 - 18)  SpO2: 95% (25 Dec 2024 05:08) (95% - 95%)    Parameters below as of 25 Dec 2024 05:08  Patient On (Oxygen Delivery Method): room air        12-25    142  |  111[H]  |  28[H]  ----------------------------<  83  3.9   |  23  |  1.50[H]    Ca    9.1      25 Dec 2024 05:40  Mg     2.1     12-24                            13.8   6.42  )-----------( 178      ( 24 Dec 2024 08:15 )             38.4       CAPILLARY BLOOD GLUCOSE        Urinalysis Basic - ( 25 Dec 2024 05:40 )    Color: x / Appearance: x / SG: x / pH: x  Gluc: 83 mg/dL / Ketone: x  / Bili: x / Urobili: x   Blood: x / Protein: x / Nitrite: x   Leuk Esterase: x / RBC: x / WBC x   Sq Epi: x / Non Sq Epi: x / Bacteria: x    < from: US Duplex Carotid Arteries Complete, Bilateral (12.24.24 @ 09:36) >    ACC: [id number] EXAM:  US DPLX CAROTIDS COMPL BI   ORDERED BY: JEANNETTE TABOR     PROCEDURE DATE:  12/24/2024          INTERPRETATION:  CLINICAL INFORMATION: CVA    COMPARISON: None available.    TECHNIQUE: Grayscale, color and spectral Doppler examination of both   carotid arteries was performed.    FINDINGS:    There is dilatation and tortuosity to the right and left carotid arteries   in the neck.    Mild to moderate calcified atheromatous plaque is most prominently   located at the bifurcations bilaterally.    No elevated velocities or abnormal waveforms are encountered.    Peak systolic velocities are as follows:    RIGHT:  PROX CCA = 75 cm/s  DIST CCA = 68 cm/s  PROX ICA = 62 cm/s  MID ICA = 67 cm/s  DIST ICA = 85 cm/s  ECA = 128 cm/s    LEFT:  PROX CCA = 128 cm/s  DIST CCA = 78 cm/s  PROX ICA = 65 cm/s  MID ICA = 80 cm/s  DIST ICA = 88 cm/s  ECA = 80 cm/s    Antegrade flow is noted within both vertebral arteries.    There is a high resistance pattern of antegrade flow through theright   vertebral artery suggesting a hypoplastic artery or a distal obstruction   of this artery.    IMPRESSION: No significant hemodynamic stenosis of either carotid artery.    Measurement of carotid stenosis is based on updated recommendations for  carotid stenosis interpretation criteria from the Intersocietal   Accreditation Commission (IAC) Vascular Testing Board, modified from the   Society of Radiologists in Ultrasound (SRU) Consensus Conference Criteria   for Internal Carotid Artery Stenosis.    --- End of Report ---            GIBRAN TILLMAN MD; Attending Interventional Radiologist  This document has been electronically signed. Dec 24 2024  4:43PM    < end of copied text >  < from: TTE Echo Complete w/o Contrast w/ Doppler (12.02.21 @ 16:40) >     EXAM:  ECHO TTE WO CON COMP W DOPP         PROCEDURE DATE:  12/02/2021        INTERPRETATION:  INDICATION: Cardiac murmur    Impression:  1. This a technically limited study.  2. There is normal left ventricular size and left ventricular systolic function with an ejection fraction of 60-65%.  3. There is normal left ventricular wall thickness.  4. There is diastolic dysfunction.  5. There is calcification thickening of the aortic valve with mild aortic valve stenosis with an aortic valve area of 1.3 sq cm and a mean aortic valve gradient of 11 and peak aortic valve gradient of 19 mmHg.  6. There is trivial aortic valve insufficiency.  7. There is mild to moderate mitral valve regurgitation.  8. There is trivial pulmonic insufficiency.  9. There is trivial tricuspid regurgitation.  10. There is normal right atrial and right ventricular size and systolic function.  11. There is no significant pericardial effusion.    Blood Pressure 133/84 mmHg         BSA 1.7 sq m    Dimensions:  LA 3.4 cm       Normal Values: 2.0 - 4.0 cm  Ao 3.6 cm        Normal Values: 2.0 - 3.8 cm  IVSd 1.2 cm       Normal Values: 0.6 - 1.2 cm  PWd 1.1 cm       Normal Values: 0.6 - 1.1 cm  LVIDd 4.9 cm         Normal Values: 3.0 - 5.6 cm  LVIDs 3.9 cm         Normal Values: 1.8 - 4.0 cm    --- End of Report ---              NIYA COHEN MD; Attending Cardiologist  This document has been electronically signed. Dec  3 2021  8:22AM    < end of copied text >            acetaminophen     Tablet .. 650 milliGRAM(s) Oral every 6 hours PRN  aluminum hydroxide/magnesium hydroxide/simethicone Suspension 30 milliLiter(s) Oral every 4 hours PRN  aspirin enteric coated 81 milliGRAM(s) Oral daily  atorvastatin 40 milliGRAM(s) Oral at bedtime  carbidopa/levodopa CR 25/100 1 Tablet(s) Oral <User Schedule>  enoxaparin Injectable 30 milliGRAM(s) SubCutaneous every 24 hours  melatonin 3 milliGRAM(s) Oral at bedtime PRN  ondansetron Injectable 4 milliGRAM(s) IV Push every 6 hours PRN  pramipexole 1 milliGRAM(s) Oral <User Schedule>  sodium chloride 0.45%. 1000 milliLiter(s) IV Continuous <Continuous>              REVIEW OF SYSTEMS:  CONSTITUTIONAL: No fever, no weight loss, or no fatigue  NECK: No pain, no stiffness  RESPIRATORY: No cough, no wheezing, no chills, no hemoptysis, No shortness of breath  CARDIOVASCULAR: No chest pain, no palpitations, no dizziness, no leg swelling  GASTROINTESTINAL: No abdominal pain. No nausea, no vomiting, no hematemesis; No diarrhea, no constipation. No melena, no hematochezia.  GENITOURINARY: No dysuria, no frequency, no hematuria, no incontinence  NEUROLOGICAL: No headaches, no loss of strength, no numbness, no tremors  SKIN: No itching, no burning  MUSCULOSKELETAL: No joint pain, no swelling; No muscle, no back, no extremity pain  PSYCHIATRIC: No depression, no mood swings,   HEME/LYMPH: No easy bruising, no bleeding gums  ALLERY AND IMMUNOLOGIC: No hives       Consultant(s) Notes Reviewed:  [X] YES  [ ] NO    PHYSICAL EXAM:  GENERAL: NAD  HEAD:  Atraumatic, Normocephalic  EYES: EOMI, PERRLA, conjunctiva and sclera clear  ENMT: No tonsillar erythema, exudates, or enlargement; Moist mucous membranes  NECK: Supple, No JVD  NERVOUS SYSTEM:  Awake & alert  CHEST/LUNG: Clear to auscultation bilaterally; No rales, rhonchi, wheezing,  HEART: Regular rate and rhythm  ABDOMEN: Soft, Nontender, Nondistended; Bowel sounds present  EXTREMITIES:  No clubbing, cyanosis, or edema  LYMPH: No lymphadenopathy noted  SKIN: No rashes      Advanced care planning discussed with patient/family [X] YES   [ ] NO    Advanced care planning discussed with patient/family. Patient's health status was discussed. All appropriate changes have been made regarding patient's end-of-life care. Advanced care planning forms reviewed/discussed/completed.  20 minutes spent.   
Date of Service: 12-24-24 @ 13:19    Patient is a 88y old  Male who presents with a chief complaint of dizziness (23 Dec 2024 22:16)      INTERVAL HPI/OVERNIGHT EVENTS: Patient seen and examined. NAD. No complaints.    Vital Signs Last 24 Hrs  T(C): 36.4 (24 Dec 2024 11:08), Max: 37.3 (23 Dec 2024 22:00)  T(F): 97.5 (24 Dec 2024 11:08), Max: 99.1 (23 Dec 2024 22:00)  HR: 65 (24 Dec 2024 11:08) (65 - 102)  BP: 155/92 (24 Dec 2024 11:08) (147/95 - 166/90)  BP(mean): --  RR: 18 (24 Dec 2024 11:08) (18 - 19)  SpO2: 95% (24 Dec 2024 11:08) (94% - 100%)    Parameters below as of 24 Dec 2024 11:08  Patient On (Oxygen Delivery Method): room air        12-24    144  |  113[H]  |  23  ----------------------------<  96  3.8   |  24  |  1.70[H]    Ca    9.7      24 Dec 2024 08:15  Mg     2.1     12-24    TPro  7.2  /  Alb  3.8  /  TBili  0.6  /  DBili  x   /  AST  22  /  ALT  11[L]  /  AlkPhos  81  12-23                          13.8   6.42  )-----------( 178      ( 24 Dec 2024 08:15 )             38.4     PT/INR - ( 23 Dec 2024 15:15 )   PT: 11.6 sec;   INR: 0.99 ratio         PTT - ( 23 Dec 2024 15:15 )  PTT:30.1 sec  CAPILLARY BLOOD GLUCOSE        Urinalysis Basic - ( 24 Dec 2024 08:15 )    Color: x / Appearance: x / SG: x / pH: x  Gluc: 96 mg/dL / Ketone: x  / Bili: x / Urobili: x   Blood: x / Protein: x / Nitrite: x   Leuk Esterase: x / RBC: x / WBC x   Sq Epi: x / Non Sq Epi: x / Bacteria: x              acetaminophen     Tablet .. 650 milliGRAM(s) Oral every 6 hours PRN  aluminum hydroxide/magnesium hydroxide/simethicone Suspension 30 milliLiter(s) Oral every 4 hours PRN  aspirin enteric coated 81 milliGRAM(s) Oral daily  atorvastatin 40 milliGRAM(s) Oral at bedtime  carbidopa/levodopa CR 25/100 1 Tablet(s) Oral <User Schedule>  enoxaparin Injectable 30 milliGRAM(s) SubCutaneous every 24 hours  melatonin 3 milliGRAM(s) Oral at bedtime PRN  ondansetron Injectable 4 milliGRAM(s) IV Push every 6 hours PRN  pramipexole 1 milliGRAM(s) Oral <User Schedule>  sodium chloride 0.9%. 1000 milliLiter(s) IV Continuous <Continuous>              REVIEW OF SYSTEMS:  CONSTITUTIONAL: No fever, no weight loss, or no fatigue  NECK: No pain, no stiffness  RESPIRATORY: No cough, no wheezing, no chills, no hemoptysis, No shortness of breath  CARDIOVASCULAR: No chest pain, no palpitations, no dizziness, no leg swelling  GASTROINTESTINAL: No abdominal pain. No nausea, no vomiting, no hematemesis; No diarrhea, no constipation. No melena, no hematochezia.  GENITOURINARY: No dysuria, no frequency, no hematuria, no incontinence  NEUROLOGICAL: No headaches, no loss of strength, no numbness, no tremors  SKIN: No itching, no burning  MUSCULOSKELETAL: No joint pain, no swelling; No muscle, no back, no extremity pain  PSYCHIATRIC: No depression, no mood swings,   HEME/LYMPH: No easy bruising, no bleeding gums  ALLERY AND IMMUNOLOGIC: No hives       Consultant(s) Notes Reviewed:  [X] YES  [ ] NO    PHYSICAL EXAM:  GENERAL: NAD  HEAD:  Atraumatic, Normocephalic  EYES: EOMI, PERRLA, conjunctiva and sclera clear  ENMT: No tonsillar erythema, exudates, or enlargement; Moist mucous membranes  NECK: Supple, No JVD  NERVOUS SYSTEM:  Awake & alert  CHEST/LUNG: Clear to auscultation bilaterally; No rales, rhonchi, wheezing,  HEART: Regular rate and rhythm  ABDOMEN: Soft, Nontender, Nondistended; Bowel sounds present  EXTREMITIES:  No clubbing, cyanosis, or edema  LYMPH: No lymphadenopathy noted  SKIN: No rashes      Advanced care planning discussed with patient/family [X] YES   [ ] NO    Advanced care planning discussed with patient/family. Patient's health status was discussed. All appropriate changes have been made regarding patient's end-of-life care. Advanced care planning forms reviewed/discussed/completed.  20 minutes spent.

## 2024-12-25 NOTE — PROGRESS NOTE ADULT - NSPROGADDITIONALINFOA_GEN_ALL_CORE
Spoke to family at bedside
Spoke to family at bedside  They want to sign out patient AMA. They don't want to wait for MRI. They understand the risks. They said they will f/u as outpatient with their PCP and arrange for an outpatient MRI.

## 2024-12-25 NOTE — DISCHARGE NOTE PROVIDER - NSDCCPCAREPLAN_GEN_ALL_CORE_FT
PRINCIPAL DISCHARGE DIAGNOSIS  Diagnosis: Dizziness  Assessment and Plan of Treatment: Follow-up with your primary care doctor within 1 week for a MRI

## 2024-12-25 NOTE — DISCHARGE NOTE PROVIDER - CARE PROVIDER_API CALL
ANASTACIA BARLOW, ANALY JOEL  Phone: [phone]  Fax: [phone]  Established Patient  Follow Up Time: 1 week

## 2024-12-25 NOTE — DISCHARGE NOTE PROVIDER - NSDCMRMEDTOKEN_GEN_ALL_CORE_FT
amLODIPine 2.5 mg oral tablet: 1 tab(s) orally once a day (in the morning)  atorvastatin 10 mg oral tablet: 1 tab(s) orally once a day  calcitriol 0.25 mcg oral capsule: 1 cap(s) orally every other day  carbidopa-levodopa 25 mg-100 mg oral tablet, extended release: 1.5 tab(s) orally 4 times a day at 8a, 12p, 6p, 10p  pramipexole 1 mg oral tablet: 1 tab(s) orally 3 times a day at 8a 12p, 6p  Chaya-Roselyn oral tablet: 1 tab(s) orally once a day  rivastigmine 4.6 mg/24 hr transdermal film, extended release: 1 patch transdermal once a day  VESIcare 5 mg oral tablet: 1 tab(s) orally once a day

## 2024-12-25 NOTE — PROGRESS NOTE ADULT - PROBLEM SELECTOR PLAN 1
CT shows ?basal ganglia and thalamic infarcts  aspirin 81mg qd  neuro check q4h  MRI/MRA brain  carotid u/s  2d echo  lipid profile, A1C  physical therapy  neuro consult   Further work-up/management pending clinical course.
CT shows ?basal ganglia and thalamic infarcts  aspirin 81mg qd  neuro check q4h  MRI/MRA brain  carotid u/s - no JHONY  2d echo - normal EF, mild-mod AS  physical therapy  neuro fu  Further work-up/management pending clinical course.

## 2024-12-25 NOTE — PROGRESS NOTE ADULT - PROBLEM SELECTOR PLAN 2
IVF  Monitor BMP -- improving  Renal f/u  Further work-up/management pending clinical course.
IVF  Monitor BMP  Renal consult  Further work-up/management pending clinical course.

## 2024-12-25 NOTE — DISCHARGE NOTE PROVIDER - NSDCHHPTASSISTHOME_GEN_ALL_CORE
[General Appearance - Well Nourished] : well nourished [General Appearance - Well Developed] : well developed [Sclera] : the sclera and conjunctiva were normal [Outer Ear] : the ears and nose were normal in appearance [Neck Appearance] : the appearance of the neck was normal [Respiration, Rhythm And Depth] : normal respiratory rhythm and effort [Heart Rate And Rhythm] : heart rate was normal and rhythm regular [Examination Of The Chest] : the chest was normal in appearance [Abnormal Walk] : normal gait [Skin Color & Pigmentation] : normal skin color and pigmentation [Sensation] : the sensory exam was normal to light touch and pinprick [Oriented To Time, Place, And Person] : oriented to person, place, and time [Impaired Insight] : insight and judgment were intact Patient Needs Assistance to Leave Residence...

## 2025-04-27 ENCOUNTER — EMERGENCY (EMERGENCY)
Facility: HOSPITAL | Age: 89
LOS: 1 days | End: 2025-04-27
Attending: STUDENT IN AN ORGANIZED HEALTH CARE EDUCATION/TRAINING PROGRAM | Admitting: STUDENT IN AN ORGANIZED HEALTH CARE EDUCATION/TRAINING PROGRAM
Payer: MEDICARE

## 2025-04-27 VITALS
WEIGHT: 145.95 LBS | TEMPERATURE: 97 F | SYSTOLIC BLOOD PRESSURE: 134 MMHG | OXYGEN SATURATION: 99 % | RESPIRATION RATE: 18 BRPM | HEIGHT: 60 IN | HEART RATE: 59 BPM | DIASTOLIC BLOOD PRESSURE: 60 MMHG

## 2025-04-27 LAB
ALBUMIN SERPL ELPH-MCNC: 3.3 G/DL — SIGNIFICANT CHANGE UP (ref 3.3–5)
ALP SERPL-CCNC: 63 U/L — SIGNIFICANT CHANGE UP (ref 40–120)
ALT FLD-CCNC: 8 U/L — LOW (ref 12–78)
ANION GAP SERPL CALC-SCNC: 5 MMOL/L — SIGNIFICANT CHANGE UP (ref 5–17)
APTT BLD: 29.8 SEC — SIGNIFICANT CHANGE UP (ref 26.1–36.8)
AST SERPL-CCNC: 12 U/L — LOW (ref 15–37)
BASOPHILS # BLD AUTO: 0.05 K/UL — SIGNIFICANT CHANGE UP (ref 0–0.2)
BASOPHILS NFR BLD AUTO: 0.9 % — SIGNIFICANT CHANGE UP (ref 0–2)
BILIRUB SERPL-MCNC: 0.4 MG/DL — SIGNIFICANT CHANGE UP (ref 0.2–1.2)
BUN SERPL-MCNC: 35 MG/DL — HIGH (ref 7–23)
CALCIUM SERPL-MCNC: 9 MG/DL — SIGNIFICANT CHANGE UP (ref 8.5–10.1)
CHLORIDE SERPL-SCNC: 109 MMOL/L — HIGH (ref 96–108)
CO2 SERPL-SCNC: 25 MMOL/L — SIGNIFICANT CHANGE UP (ref 22–31)
CREAT SERPL-MCNC: 1.9 MG/DL — HIGH (ref 0.5–1.3)
EGFR: 33 ML/MIN/1.73M2 — LOW
EGFR: 33 ML/MIN/1.73M2 — LOW
EOSINOPHIL # BLD AUTO: 0.19 K/UL — SIGNIFICANT CHANGE UP (ref 0–0.5)
EOSINOPHIL NFR BLD AUTO: 3.2 % — SIGNIFICANT CHANGE UP (ref 0–6)
GLUCOSE SERPL-MCNC: 135 MG/DL — HIGH (ref 70–99)
HCT VFR BLD CALC: 36.8 % — LOW (ref 39–50)
HGB BLD-MCNC: 12.9 G/DL — LOW (ref 13–17)
IMM GRANULOCYTES NFR BLD AUTO: 0.3 % — SIGNIFICANT CHANGE UP (ref 0–0.9)
INR BLD: 0.97 RATIO — SIGNIFICANT CHANGE UP (ref 0.85–1.16)
LIDOCAIN IGE QN: 23 U/L — SIGNIFICANT CHANGE UP (ref 13–75)
LYMPHOCYTES # BLD AUTO: 1.34 K/UL — SIGNIFICANT CHANGE UP (ref 1–3.3)
LYMPHOCYTES # BLD AUTO: 22.8 % — SIGNIFICANT CHANGE UP (ref 13–44)
MCHC RBC-ENTMCNC: 31.5 PG — SIGNIFICANT CHANGE UP (ref 27–34)
MCHC RBC-ENTMCNC: 35.1 G/DL — SIGNIFICANT CHANGE UP (ref 32–36)
MCV RBC AUTO: 89.8 FL — SIGNIFICANT CHANGE UP (ref 80–100)
MONOCYTES # BLD AUTO: 0.39 K/UL — SIGNIFICANT CHANGE UP (ref 0–0.9)
MONOCYTES NFR BLD AUTO: 6.6 % — SIGNIFICANT CHANGE UP (ref 2–14)
NEUTROPHILS # BLD AUTO: 3.88 K/UL — SIGNIFICANT CHANGE UP (ref 1.8–7.4)
NEUTROPHILS NFR BLD AUTO: 66.2 % — SIGNIFICANT CHANGE UP (ref 43–77)
NRBC BLD AUTO-RTO: 0 /100 WBCS — SIGNIFICANT CHANGE UP (ref 0–0)
PLATELET # BLD AUTO: 157 K/UL — SIGNIFICANT CHANGE UP (ref 150–400)
POTASSIUM SERPL-MCNC: 4.2 MMOL/L — SIGNIFICANT CHANGE UP (ref 3.5–5.3)
POTASSIUM SERPL-SCNC: 4.2 MMOL/L — SIGNIFICANT CHANGE UP (ref 3.5–5.3)
PROT SERPL-MCNC: 6.7 G/DL — SIGNIFICANT CHANGE UP (ref 6–8.3)
PROTHROM AB SERPL-ACNC: 11.4 SEC — SIGNIFICANT CHANGE UP (ref 9.9–13.4)
RBC # BLD: 4.1 M/UL — LOW (ref 4.2–5.8)
RBC # FLD: 12.4 % — SIGNIFICANT CHANGE UP (ref 10.3–14.5)
SODIUM SERPL-SCNC: 139 MMOL/L — SIGNIFICANT CHANGE UP (ref 135–145)
TROPONIN I, HIGH SENSITIVITY RESULT: 17.7 NG/L — SIGNIFICANT CHANGE UP
WBC # BLD: 5.87 K/UL — SIGNIFICANT CHANGE UP (ref 3.8–10.5)
WBC # FLD AUTO: 5.87 K/UL — SIGNIFICANT CHANGE UP (ref 3.8–10.5)

## 2025-04-27 PROCEDURE — 71045 X-RAY EXAM CHEST 1 VIEW: CPT | Mod: 26

## 2025-04-27 PROCEDURE — 71250 CT THORAX DX C-: CPT | Mod: 26

## 2025-04-27 PROCEDURE — 93010 ELECTROCARDIOGRAM REPORT: CPT

## 2025-04-27 PROCEDURE — 74176 CT ABD & PELVIS W/O CONTRAST: CPT | Mod: 26

## 2025-04-27 PROCEDURE — 99285 EMERGENCY DEPT VISIT HI MDM: CPT | Mod: FS

## 2025-04-27 PROCEDURE — 70450 CT HEAD/BRAIN W/O DYE: CPT | Mod: 26

## 2025-04-27 NOTE — ED ADULT NURSE NOTE - OBJECTIVE STATEMENT
Pt. received alert and oriented x4 with chief complaint of fall in bathtub 2 days ago w/ pain to right hip and right torso area.

## 2025-04-27 NOTE — ED PROVIDER NOTE - NSFOLLOWUPINSTRUCTIONS_ED_ALL_ED_FT
Follow up with your primary care doctor and cardiology. Return for chest pain, shortness of breath, fever, vomiting, rash, worsening condition .    Chest Pain    Chest pain can be caused by many different conditions which may or may not be dangerous. Causes include heartburn, lung infections, heart attack, blood clot in lungs, skin infections, strain or damage to muscle, cartilage, or bones, etc. In addition to a history and physical examination, an electrocardiogram (ECG) or other lab tests may have been performed to determine the cause of your chest pain. Follow up with your primary care provider or with a cardiologist as instructed.     SEEK IMMEDIATE MEDICAL CARE IF YOU HAVE ANY OF THE FOLLOWING SYMPTOMS: worsening chest pain, coughing up blood, unexplained back/neck/jaw pain, severe abdominal pain, dizziness or lightheadedness, fainting, shortness of breath, sweaty or clammy skin, vomiting, or racing heart beat. These symptoms may represent a serious problem that is an emergency. Do not wait to see if the symptoms will go away. Get medical help right away. Call 911 and do not drive yourself to the hospital.

## 2025-04-27 NOTE — ED PROVIDER NOTE - PATIENT PORTAL LINK FT
You can access the FollowMyHealth Patient Portal offered by [facility] by registering at the following website: [email or web address]. By joining [facility]’s FollowMyHealth portal, you will also be able to view your health information using other applications (apps) compatible with our system.

## 2025-04-27 NOTE — ED ADULT TRIAGE NOTE - CHIEF COMPLAINT QUOTE
I have pain that starts right chest wall region and radiates down to right side abdomen.  no rash noted (h/o shingles many years ago and he sometimes has residual pain).  saw urgent care md who performed ekg and told them everything looked okay but to go to er for further testing.  no n/v/d.  no fevers.  (wife states that he did fall two nights ago but got up very quick and was fine.  states the pain he is experiencing is preceding to this fall.  no other h/o falls prior to the onset of this complaint.  denies any chest pain or sob  but points to right chest wall as the location of the pain.  t2 for EKG

## 2025-04-27 NOTE — ED PROVIDER NOTE - ATTENDING APP SHARED VISIT CONTRIBUTION OF CARE
Hal ATTG See MDM I performed a history and physical exam of the patient and discussed their management with the Physician assistant reviewed the PAs note and agree with the documented findings and plan of care. My medical decision making and observations are found above.

## 2025-04-27 NOTE — ED PROVIDER NOTE - PHYSICAL EXAMINATION
Constitutional: Awake, Alert, non-toxic  HEAD: Normocephalic, atraumatic.   EYES: EOM intact, conjunctiva and sclera are clear bilaterally  ENT: No rhinorrhea, normal pharynx, patent, no tonsillar exudate or enlargement, mucous membranes pink/moist, no erythema, no drooling or stridor.   NECK: Supple, non-tender  BACK: No midline or paraspinal TTP of cervical/thoracic/lumbar spine, FROM. No ecchymosis or hematomas. no rib TTP  CARDIOVASCULAR: Normal S1, S2; regular rate and rhythm.  RESPIRATORY: Normal respiratory effort; breath sounds CTAB, no wheezes, rhonchi, or rales. Speaking in full sentences. No accessory muscle use.   ABDOMEN: Soft; non-tender, non-distended.  EXTREMITIES: Full passive and active ROM in all extremities; hips and extremities non-tender to palpation; distal pulses palpable and symmetric, no edema, no crepitus or step off  SKIN: Warm, dry; good skin turgor, no apparent lesions or rashes, no vesicles. no ecchymosis, brisk capillary refill.  NEURO: A&O x3. Sensory and motor functions are grossly intact. Speech is normal. Appearance and judgement seem appropriate for gender and age. No neurological deficits. Neurovascular sensation intact motor function 5/5 of upper and lower extremities, CN II-XII grossly intact

## 2025-04-27 NOTE — ED PROVIDER NOTE - OBJECTIVE STATEMENT
89-year-old male past medical history of Parkinson's, hypertension, hyperlipidemia presents today due to right-sided chest pain history of Parkinson's who is a poor historian.  Patient is at the bedside with his wife in which he is noting that he has been noting pain to his right abdomen and chest which they are concerned as he has a prior history of shingles.  No obvious rash that they noted.  Patient did have a fall 2 days ago in which the wife noticed that the patient got right up and was on face.  Patient denies headache, shortness of breath, fever, hemoptysis, cough, dysuria, or any other complaints.

## 2025-04-27 NOTE — ED PROVIDER NOTE - CLINICAL SUMMARY MEDICAL DECISION MAKING FREE TEXT BOX
· HPI Objective Statement: 89-year-old male past medical history of Parkinson's, hypertension, hyperlipidemia presents today due to right-sided chest pain history of Parkinson's who is a poor historian.  Patient is at the bedside with his wife in which he is noting that he has been noting pain to his right abdomen and chest which they are concerned as he has a prior history of shingles.  No obvious rash that they noted.  Patient did have a fall 2 days ago in which the wife noticed that the patient got right up and was on face.  Patient denies headache, shortness of breath, fever, hemoptysis, cough, dysuria, or any other complaints.        pt is sensitive to touch overlying the right chest side, when lightly touching the skin the pain is irritated, suspect 2/2 shingles lingering pain as pt has had this in the past, plan for dc labs benign

## 2025-04-27 NOTE — ED PROVIDER NOTE - CARE PROVIDERS DIRECT ADDRESSES
[email or web address]

## 2025-04-28 VITALS
OXYGEN SATURATION: 95 % | HEART RATE: 62 BPM | SYSTOLIC BLOOD PRESSURE: 150 MMHG | DIASTOLIC BLOOD PRESSURE: 90 MMHG | RESPIRATION RATE: 19 BRPM | TEMPERATURE: 98 F

## 2025-04-28 LAB
APPEARANCE UR: CLEAR — SIGNIFICANT CHANGE UP
BILIRUB UR-MCNC: NEGATIVE — SIGNIFICANT CHANGE UP
COLOR SPEC: YELLOW — SIGNIFICANT CHANGE UP
DIFF PNL FLD: NEGATIVE — SIGNIFICANT CHANGE UP
GLUCOSE UR QL: NEGATIVE MG/DL — SIGNIFICANT CHANGE UP
KETONES UR-MCNC: NEGATIVE MG/DL — SIGNIFICANT CHANGE UP
LEUKOCYTE ESTERASE UR-ACNC: NEGATIVE — SIGNIFICANT CHANGE UP
NITRITE UR-MCNC: NEGATIVE — SIGNIFICANT CHANGE UP
PH UR: 6 — SIGNIFICANT CHANGE UP (ref 5–8)
PROT UR-MCNC: 30 MG/DL
SP GR SPEC: 1.01 — SIGNIFICANT CHANGE UP (ref 1–1.03)
UROBILINOGEN FLD QL: 1 MG/DL — SIGNIFICANT CHANGE UP (ref 0.2–1)

## 2025-04-28 PROCEDURE — 85730 THROMBOPLASTIN TIME PARTIAL: CPT

## 2025-04-28 PROCEDURE — 80053 COMPREHEN METABOLIC PANEL: CPT

## 2025-04-28 PROCEDURE — 85610 PROTHROMBIN TIME: CPT

## 2025-04-28 PROCEDURE — 99285 EMERGENCY DEPT VISIT HI MDM: CPT | Mod: 25

## 2025-04-28 PROCEDURE — 70450 CT HEAD/BRAIN W/O DYE: CPT | Mod: MC

## 2025-04-28 PROCEDURE — 93005 ELECTROCARDIOGRAM TRACING: CPT

## 2025-04-28 PROCEDURE — 81001 URINALYSIS AUTO W/SCOPE: CPT

## 2025-04-28 PROCEDURE — 84484 ASSAY OF TROPONIN QUANT: CPT

## 2025-04-28 PROCEDURE — 83690 ASSAY OF LIPASE: CPT

## 2025-04-28 PROCEDURE — 71045 X-RAY EXAM CHEST 1 VIEW: CPT

## 2025-04-28 PROCEDURE — 85025 COMPLETE CBC W/AUTO DIFF WBC: CPT

## 2025-04-28 PROCEDURE — 36415 COLL VENOUS BLD VENIPUNCTURE: CPT

## 2025-04-28 PROCEDURE — 74176 CT ABD & PELVIS W/O CONTRAST: CPT | Mod: MC

## 2025-04-28 PROCEDURE — 71250 CT THORAX DX C-: CPT | Mod: MC
